# Patient Record
Sex: MALE | Race: BLACK OR AFRICAN AMERICAN | NOT HISPANIC OR LATINO
[De-identification: names, ages, dates, MRNs, and addresses within clinical notes are randomized per-mention and may not be internally consistent; named-entity substitution may affect disease eponyms.]

---

## 2021-11-30 ENCOUNTER — TRANSCRIPTION ENCOUNTER (OUTPATIENT)
Age: 46
End: 2021-11-30

## 2021-11-30 ENCOUNTER — INPATIENT (INPATIENT)
Facility: HOSPITAL | Age: 46
LOS: 6 days | Discharge: NURSING, REHAB OR LONG-TERM CARE | DRG: 853 | End: 2021-12-07
Attending: INTERNAL MEDICINE | Admitting: INTERNAL MEDICINE
Payer: MEDICAID

## 2021-11-30 VITALS
HEART RATE: 117 BPM | SYSTOLIC BLOOD PRESSURE: 132 MMHG | HEIGHT: 78 IN | TEMPERATURE: 98 F | DIASTOLIC BLOOD PRESSURE: 85 MMHG | WEIGHT: 175.05 LBS | RESPIRATION RATE: 20 BRPM | OXYGEN SATURATION: 100 %

## 2021-11-30 LAB
A1C WITH ESTIMATED AVERAGE GLUCOSE RESULT: 14.3 % — HIGH (ref 4–5.6)
ALBUMIN SERPL ELPH-MCNC: 2.4 G/DL — LOW (ref 3.4–5)
ALP SERPL-CCNC: 235 U/L — HIGH (ref 40–120)
ALT FLD-CCNC: 12 U/L — SIGNIFICANT CHANGE UP (ref 12–42)
AMPHET UR-MCNC: NEGATIVE — SIGNIFICANT CHANGE UP
ANION GAP SERPL CALC-SCNC: 27 MMOL/L — HIGH (ref 5–17)
ANION GAP SERPL CALC-SCNC: 31 MMOL/L — HIGH (ref 9–16)
APPEARANCE UR: CLEAR — SIGNIFICANT CHANGE UP
APTT BLD: 39.6 SEC — HIGH (ref 27.5–35.5)
AST SERPL-CCNC: 12 U/L — LOW (ref 15–37)
B-OH-BUTYR SERPL-SCNC: 6.1 MMOL/L — HIGH
BACTERIA # UR AUTO: PRESENT /HPF
BARBITURATES UR SCN-MCNC: NEGATIVE — SIGNIFICANT CHANGE UP
BASOPHILS # BLD AUTO: 0 K/UL — SIGNIFICANT CHANGE UP (ref 0–0.2)
BASOPHILS # BLD AUTO: 0.07 K/UL — SIGNIFICANT CHANGE UP (ref 0–0.2)
BASOPHILS NFR BLD AUTO: 0 % — SIGNIFICANT CHANGE UP (ref 0–2)
BASOPHILS NFR BLD AUTO: 0.3 % — SIGNIFICANT CHANGE UP (ref 0–2)
BENZODIAZ UR-MCNC: NEGATIVE — SIGNIFICANT CHANGE UP
BILIRUB SERPL-MCNC: 0.6 MG/DL — SIGNIFICANT CHANGE UP (ref 0.2–1.2)
BILIRUB UR-MCNC: ABNORMAL
BUN SERPL-MCNC: 45 MG/DL — HIGH (ref 7–23)
BUN SERPL-MCNC: 60 MG/DL — HIGH (ref 7–23)
CALCIUM SERPL-MCNC: 10.5 MG/DL — SIGNIFICANT CHANGE UP (ref 8.5–10.5)
CALCIUM SERPL-MCNC: 9.4 MG/DL — SIGNIFICANT CHANGE UP (ref 8.4–10.5)
CHLORIDE SERPL-SCNC: 79 MMOL/L — LOW (ref 96–108)
CHLORIDE SERPL-SCNC: 92 MMOL/L — LOW (ref 96–108)
CO2 SERPL-SCNC: 11 MMOL/L — LOW (ref 22–31)
CO2 SERPL-SCNC: 12 MMOL/L — LOW (ref 22–31)
COCAINE METAB.OTHER UR-MCNC: NEGATIVE — SIGNIFICANT CHANGE UP
COLOR SPEC: YELLOW — SIGNIFICANT CHANGE UP
CREAT SERPL-MCNC: 1.05 MG/DL — SIGNIFICANT CHANGE UP (ref 0.5–1.3)
CREAT SERPL-MCNC: 2.35 MG/DL — HIGH (ref 0.5–1.3)
DIFF PNL FLD: ABNORMAL
EOSINOPHIL # BLD AUTO: 0 K/UL — SIGNIFICANT CHANGE UP (ref 0–0.5)
EOSINOPHIL # BLD AUTO: 0 K/UL — SIGNIFICANT CHANGE UP (ref 0–0.5)
EOSINOPHIL NFR BLD AUTO: 0 % — SIGNIFICANT CHANGE UP (ref 0–6)
EOSINOPHIL NFR BLD AUTO: 0 % — SIGNIFICANT CHANGE UP (ref 0–6)
EPI CELLS # UR: SIGNIFICANT CHANGE UP /HPF (ref 0–5)
ESTIMATED AVERAGE GLUCOSE: 364 MG/DL — HIGH (ref 68–114)
ETHANOL SERPL-MCNC: <3 MG/DL — SIGNIFICANT CHANGE UP
GLUCOSE BLDC GLUCOMTR-MCNC: 316 MG/DL — HIGH (ref 70–99)
GLUCOSE BLDC GLUCOMTR-MCNC: 344 MG/DL — HIGH (ref 70–99)
GLUCOSE BLDC GLUCOMTR-MCNC: 369 MG/DL — HIGH (ref 70–99)
GLUCOSE BLDC GLUCOMTR-MCNC: 375 MG/DL — HIGH (ref 70–99)
GLUCOSE BLDC GLUCOMTR-MCNC: 472 MG/DL — CRITICAL HIGH (ref 70–99)
GLUCOSE BLDC GLUCOMTR-MCNC: 544 MG/DL — CRITICAL HIGH (ref 70–99)
GLUCOSE BLDC GLUCOMTR-MCNC: 559 MG/DL — CRITICAL HIGH (ref 70–99)
GLUCOSE BLDC GLUCOMTR-MCNC: >600 MG/DL — CRITICAL HIGH (ref 70–99)
GLUCOSE SERPL-MCNC: 523 MG/DL — CRITICAL HIGH (ref 70–99)
GLUCOSE SERPL-MCNC: 912 MG/DL — CRITICAL HIGH (ref 70–99)
GLUCOSE UR QL: 500
HCT VFR BLD CALC: 34.7 % — LOW (ref 39–50)
HCT VFR BLD CALC: 40.5 % — SIGNIFICANT CHANGE UP (ref 39–50)
HGB BLD-MCNC: 11.9 G/DL — LOW (ref 13–17)
HGB BLD-MCNC: 13.1 G/DL — SIGNIFICANT CHANGE UP (ref 13–17)
HYALINE CASTS # UR AUTO: ABNORMAL /LPF (ref 0–2)
IMM GRANULOCYTES NFR BLD AUTO: 4.2 % — HIGH (ref 0–1.5)
INR BLD: 1.32 — HIGH (ref 0.88–1.16)
KETONES UR-MCNC: >=80 MG/DL
LACTATE SERPL-SCNC: 2.3 MMOL/L — HIGH (ref 0.4–2)
LEUKOCYTE ESTERASE UR-ACNC: NEGATIVE — SIGNIFICANT CHANGE UP
LIDOCAIN IGE QN: 78 U/L — SIGNIFICANT CHANGE UP (ref 73–393)
LYMPHOCYTES # BLD AUTO: 1.51 K/UL — SIGNIFICANT CHANGE UP (ref 1–3.3)
LYMPHOCYTES # BLD AUTO: 11 % — LOW (ref 13–44)
LYMPHOCYTES # BLD AUTO: 2.2 K/UL — SIGNIFICANT CHANGE UP (ref 1–3.3)
LYMPHOCYTES # BLD AUTO: 7.5 % — LOW (ref 13–44)
MAGNESIUM SERPL-MCNC: 2.2 MG/DL — SIGNIFICANT CHANGE UP (ref 1.6–2.6)
MAGNESIUM SERPL-MCNC: 2.6 MG/DL — SIGNIFICANT CHANGE UP (ref 1.6–2.6)
MANUAL SMEAR VERIFICATION: SIGNIFICANT CHANGE UP
MCHC RBC-ENTMCNC: 29.4 PG — SIGNIFICANT CHANGE UP (ref 27–34)
MCHC RBC-ENTMCNC: 29.5 PG — SIGNIFICANT CHANGE UP (ref 27–34)
MCHC RBC-ENTMCNC: 32.3 GM/DL — SIGNIFICANT CHANGE UP (ref 32–36)
MCHC RBC-ENTMCNC: 34.3 GM/DL — SIGNIFICANT CHANGE UP (ref 32–36)
MCV RBC AUTO: 86.1 FL — SIGNIFICANT CHANGE UP (ref 80–100)
MCV RBC AUTO: 90.8 FL — SIGNIFICANT CHANGE UP (ref 80–100)
METAMYELOCYTES # FLD: 2 % — HIGH (ref 0–0)
METHADONE UR-MCNC: NEGATIVE — SIGNIFICANT CHANGE UP
MONOCYTES # BLD AUTO: 0 K/UL — SIGNIFICANT CHANGE UP (ref 0–0.9)
MONOCYTES # BLD AUTO: 1.37 K/UL — HIGH (ref 0–0.9)
MONOCYTES NFR BLD AUTO: 0 % — LOW (ref 2–14)
MONOCYTES NFR BLD AUTO: 6.8 % — SIGNIFICANT CHANGE UP (ref 2–14)
MYELOCYTES NFR BLD: 2 % — HIGH (ref 0–0)
NEUTROPHILS # BLD AUTO: 16.24 K/UL — HIGH (ref 1.8–7.4)
NEUTROPHILS # BLD AUTO: 16.97 K/UL — HIGH (ref 1.8–7.4)
NEUTROPHILS NFR BLD AUTO: 70 % — SIGNIFICANT CHANGE UP (ref 43–77)
NEUTROPHILS NFR BLD AUTO: 81.2 % — HIGH (ref 43–77)
NEUTS BAND # BLD: 15 % — HIGH (ref 0–8)
NITRITE UR-MCNC: NEGATIVE — SIGNIFICANT CHANGE UP
NRBC # BLD: 0 /100 WBCS — SIGNIFICANT CHANGE UP (ref 0–0)
NRBC # BLD: 0 /100 — SIGNIFICANT CHANGE UP (ref 0–0)
NRBC # BLD: SIGNIFICANT CHANGE UP /100 WBCS (ref 0–0)
NT-PROBNP SERPL-SCNC: 220 PG/ML — SIGNIFICANT CHANGE UP
OPIATES UR-MCNC: NEGATIVE — SIGNIFICANT CHANGE UP
PCO2 BLDV: 24 MMHG — LOW (ref 42–55)
PCP SPEC-MCNC: SIGNIFICANT CHANGE UP
PCP UR-MCNC: NEGATIVE — SIGNIFICANT CHANGE UP
PH BLDV: 7.2 — LOW (ref 7.32–7.43)
PH UR: 5 — SIGNIFICANT CHANGE UP (ref 5–8)
PHOSPHATE SERPL-MCNC: 2.8 MG/DL — SIGNIFICANT CHANGE UP (ref 2.5–4.5)
PLAT MORPH BLD: NORMAL — SIGNIFICANT CHANGE UP
PLATELET # BLD AUTO: 575 K/UL — HIGH (ref 150–400)
PLATELET # BLD AUTO: 580 K/UL — HIGH (ref 150–400)
PLATELET COUNT - ESTIMATE: ABNORMAL
PO2 BLDV: <35 MMHG — SIGNIFICANT CHANGE UP (ref 25–45)
POTASSIUM SERPL-MCNC: 4.8 MMOL/L — SIGNIFICANT CHANGE UP (ref 3.5–5.3)
POTASSIUM SERPL-MCNC: 6.7 MMOL/L — CRITICAL HIGH (ref 3.5–5.3)
POTASSIUM SERPL-SCNC: 4.8 MMOL/L — SIGNIFICANT CHANGE UP (ref 3.5–5.3)
POTASSIUM SERPL-SCNC: 6.7 MMOL/L — CRITICAL HIGH (ref 3.5–5.3)
PROT SERPL-MCNC: 10.1 G/DL — HIGH (ref 6.4–8.2)
PROT UR-MCNC: NEGATIVE MG/DL — SIGNIFICANT CHANGE UP
PROTHROM AB SERPL-ACNC: 15.6 SEC — HIGH (ref 10.6–13.6)
RBC # BLD: 4.03 M/UL — LOW (ref 4.2–5.8)
RBC # BLD: 4.46 M/UL — SIGNIFICANT CHANGE UP (ref 4.2–5.8)
RBC # FLD: 13.3 % — SIGNIFICANT CHANGE UP (ref 10.3–14.5)
RBC # FLD: 13.7 % — SIGNIFICANT CHANGE UP (ref 10.3–14.5)
RBC BLD AUTO: NORMAL — SIGNIFICANT CHANGE UP
RBC CASTS # UR COMP ASSIST: < 5 /HPF — SIGNIFICANT CHANGE UP
SAO2 % BLDV: 47.2 % — LOW (ref 67–88)
SARS-COV-2 RNA SPEC QL NAA+PROBE: SIGNIFICANT CHANGE UP
SMUDGE CELLS # BLD: PRESENT — SIGNIFICANT CHANGE UP
SODIUM SERPL-SCNC: 121 MMOL/L — LOW (ref 132–145)
SODIUM SERPL-SCNC: 131 MMOL/L — LOW (ref 135–145)
SP GR SPEC: 1.02 — SIGNIFICANT CHANGE UP (ref 1–1.03)
THC UR QL: NEGATIVE — SIGNIFICANT CHANGE UP
TROPONIN I, HIGH SENSITIVITY RESULT: 6 NG/L — SIGNIFICANT CHANGE UP
TROPONIN T SERPL-MCNC: 0.01 NG/ML — SIGNIFICANT CHANGE UP (ref 0–0.01)
UROBILINOGEN FLD QL: 0.2 E.U./DL — SIGNIFICANT CHANGE UP
WBC # BLD: 19.97 K/UL — HIGH (ref 3.8–10.5)
WBC # BLD: 20.03 K/UL — HIGH (ref 3.8–10.5)
WBC # FLD AUTO: 19.97 K/UL — HIGH (ref 3.8–10.5)
WBC # FLD AUTO: 20.03 K/UL — HIGH (ref 3.8–10.5)
WBC UR QL: < 5 /HPF — SIGNIFICANT CHANGE UP

## 2021-11-30 PROCEDURE — 73630 X-RAY EXAM OF FOOT: CPT | Mod: 26,RT

## 2021-11-30 PROCEDURE — 99285 EMERGENCY DEPT VISIT HI MDM: CPT | Mod: 25

## 2021-11-30 PROCEDURE — 71045 X-RAY EXAM CHEST 1 VIEW: CPT | Mod: 26

## 2021-11-30 PROCEDURE — 99291 CRITICAL CARE FIRST HOUR: CPT

## 2021-11-30 PROCEDURE — 93010 ELECTROCARDIOGRAM REPORT: CPT

## 2021-11-30 RX ORDER — INSULIN HUMAN 100 [IU]/ML
8 INJECTION, SOLUTION SUBCUTANEOUS ONCE
Refills: 0 | Status: COMPLETED | OUTPATIENT
Start: 2021-11-30 | End: 2021-11-30

## 2021-11-30 RX ORDER — VANCOMYCIN HCL 1 G
1000 VIAL (EA) INTRAVENOUS EVERY 12 HOURS
Refills: 0 | Status: DISCONTINUED | OUTPATIENT
Start: 2021-12-01 | End: 2021-12-02

## 2021-11-30 RX ORDER — SODIUM CHLORIDE 9 MG/ML
1000 INJECTION INTRAMUSCULAR; INTRAVENOUS; SUBCUTANEOUS ONCE
Refills: 0 | Status: COMPLETED | OUTPATIENT
Start: 2021-11-30 | End: 2021-11-30

## 2021-11-30 RX ORDER — SODIUM CHLORIDE 9 MG/ML
2000 INJECTION INTRAMUSCULAR; INTRAVENOUS; SUBCUTANEOUS ONCE
Refills: 0 | Status: COMPLETED | OUTPATIENT
Start: 2021-11-30 | End: 2021-11-30

## 2021-11-30 RX ORDER — PIPERACILLIN AND TAZOBACTAM 4; .5 G/20ML; G/20ML
3.38 INJECTION, POWDER, LYOPHILIZED, FOR SOLUTION INTRAVENOUS ONCE
Refills: 0 | Status: COMPLETED | OUTPATIENT
Start: 2021-11-30 | End: 2021-11-30

## 2021-11-30 RX ORDER — ENOXAPARIN SODIUM 100 MG/ML
40 INJECTION SUBCUTANEOUS AT BEDTIME
Refills: 0 | Status: DISCONTINUED | OUTPATIENT
Start: 2021-11-30 | End: 2021-12-01

## 2021-11-30 RX ORDER — ATORVASTATIN CALCIUM 80 MG/1
20 TABLET, FILM COATED ORAL AT BEDTIME
Refills: 0 | Status: DISCONTINUED | OUTPATIENT
Start: 2021-11-30 | End: 2021-12-07

## 2021-11-30 RX ORDER — DEXTROSE 50 % IN WATER 50 %
50 SYRINGE (ML) INTRAVENOUS
Refills: 0 | Status: DISCONTINUED | OUTPATIENT
Start: 2021-11-30 | End: 2021-12-07

## 2021-11-30 RX ORDER — CALCIUM CARBONATE 500(1250)
1 TABLET ORAL THREE TIMES A DAY
Refills: 0 | Status: DISCONTINUED | OUTPATIENT
Start: 2021-11-30 | End: 2021-12-07

## 2021-11-30 RX ORDER — VANCOMYCIN HCL 1 G
1000 VIAL (EA) INTRAVENOUS ONCE
Refills: 0 | Status: COMPLETED | OUTPATIENT
Start: 2021-11-30 | End: 2021-11-30

## 2021-11-30 RX ORDER — INSULIN HUMAN 100 [IU]/ML
7 INJECTION, SOLUTION SUBCUTANEOUS
Qty: 100 | Refills: 0 | Status: DISCONTINUED | OUTPATIENT
Start: 2021-11-30 | End: 2021-11-30

## 2021-11-30 RX ORDER — ATORVASTATIN CALCIUM 80 MG/1
1 TABLET, FILM COATED ORAL
Qty: 0 | Refills: 0 | DISCHARGE

## 2021-11-30 RX ORDER — SIMETHICONE 80 MG/1
80 TABLET, CHEWABLE ORAL
Refills: 0 | Status: DISCONTINUED | OUTPATIENT
Start: 2021-11-30 | End: 2021-12-07

## 2021-11-30 RX ORDER — PIPERACILLIN AND TAZOBACTAM 4; .5 G/20ML; G/20ML
3.38 INJECTION, POWDER, LYOPHILIZED, FOR SOLUTION INTRAVENOUS EVERY 6 HOURS
Refills: 0 | Status: DISCONTINUED | OUTPATIENT
Start: 2021-11-30 | End: 2021-11-30

## 2021-11-30 RX ORDER — PIPERACILLIN AND TAZOBACTAM 4; .5 G/20ML; G/20ML
3.38 INJECTION, POWDER, LYOPHILIZED, FOR SOLUTION INTRAVENOUS EVERY 6 HOURS
Refills: 0 | Status: DISCONTINUED | OUTPATIENT
Start: 2021-12-01 | End: 2021-12-02

## 2021-11-30 RX ORDER — ACETAMINOPHEN 500 MG
650 TABLET ORAL EVERY 6 HOURS
Refills: 0 | Status: DISCONTINUED | OUTPATIENT
Start: 2021-11-30 | End: 2021-12-07

## 2021-11-30 RX ORDER — PANTOPRAZOLE SODIUM 20 MG/1
40 TABLET, DELAYED RELEASE ORAL
Refills: 0 | Status: DISCONTINUED | OUTPATIENT
Start: 2021-11-30 | End: 2021-12-02

## 2021-11-30 RX ORDER — INSULIN HUMAN 100 [IU]/ML
7 INJECTION, SOLUTION SUBCUTANEOUS
Qty: 50 | Refills: 0 | Status: DISCONTINUED | OUTPATIENT
Start: 2021-11-30 | End: 2021-12-01

## 2021-11-30 RX ORDER — INFLUENZA VIRUS VACCINE 15; 15; 15; 15 UG/.5ML; UG/.5ML; UG/.5ML; UG/.5ML
0.5 SUSPENSION INTRAMUSCULAR ONCE
Refills: 0 | Status: COMPLETED | OUTPATIENT
Start: 2021-11-30 | End: 2021-12-02

## 2021-11-30 RX ADMIN — INSULIN HUMAN 7 UNIT(S)/HR: 100 INJECTION, SOLUTION SUBCUTANEOUS at 15:55

## 2021-11-30 RX ADMIN — PIPERACILLIN AND TAZOBACTAM 200 GRAM(S): 4; .5 INJECTION, POWDER, LYOPHILIZED, FOR SOLUTION INTRAVENOUS at 22:02

## 2021-11-30 RX ADMIN — SODIUM CHLORIDE 2000 MILLILITER(S): 9 INJECTION INTRAMUSCULAR; INTRAVENOUS; SUBCUTANEOUS at 14:52

## 2021-11-30 RX ADMIN — PIPERACILLIN AND TAZOBACTAM 200 GRAM(S): 4; .5 INJECTION, POWDER, LYOPHILIZED, FOR SOLUTION INTRAVENOUS at 15:08

## 2021-11-30 RX ADMIN — INSULIN HUMAN 8 UNIT(S): 100 INJECTION, SOLUTION SUBCUTANEOUS at 15:53

## 2021-11-30 RX ADMIN — Medication 250 MILLIGRAM(S): at 15:08

## 2021-11-30 RX ADMIN — SODIUM CHLORIDE 1000 MILLILITER(S): 9 INJECTION INTRAMUSCULAR; INTRAVENOUS; SUBCUTANEOUS at 15:07

## 2021-11-30 RX ADMIN — INSULIN HUMAN 7 UNIT(S)/HR: 100 INJECTION, SOLUTION SUBCUTANEOUS at 19:52

## 2021-11-30 RX ADMIN — SODIUM CHLORIDE 2000 MILLILITER(S): 9 INJECTION INTRAMUSCULAR; INTRAVENOUS; SUBCUTANEOUS at 13:23

## 2021-11-30 NOTE — ED PROVIDER NOTE - CLINICAL SUMMARY MEDICAL DECISION MAKING FREE TEXT BOX
47 y/o M presents to ED with c/o generalized weakness, malaise, shortness of breath and chest pain.  Pt afebrile, tachycardic 100-110 bmp upon arrival, normotensive.  Pt hyperglycemic, blood glucose ~900 with anion gap 30.  pH 7.2. potassium 6.7.  no peaked twaves on EKG.  Pt in DKA likely secondary to infected diabetic left foot ulcer.  WBC ~20.  Bun/Cr 60/2.3.  Pt hydrated with 3L of NS and given insulin.  Pt pan cultured and abx Vanc/zosyn administered. Initial lactate 2.3.      Pt discussed with Dr. Garcia and accepted to medical ICU.  Pt amenable to admission.

## 2021-11-30 NOTE — H&P ADULT - HISTORY OF PRESENT ILLNESS
45yo M PMH DM (diagnosed 11/2020), HLD presented from [facility] complaining of chest pain. Pt states he has not been feeling well and has had "a lot going on", but that he called EMS for new onset chest pain, substernal and pressure-like. Says his last meal was approx 1 week ago but he reports having 2 iced coffees yesterday. Also reporting polyuria, polydipsia and decreased appetite for the past 1 week. Of note pt was diagnosed w/ DM last year 11/2020 at which time he was admitted for DKA and all toes on his L foot were amputated.     ED course:  VS:   Labs: wbc 19.97, Hgb 13.1, K 6.7, AG 31, Glucose 912, lactate 2.3  Interventions: insulin gtt, 3L NS  EKG: 47yo M [facility] IDDM (diagnosed 11/2020) presented from [facility] complaining of chest pain. Pt states he has not been feeling well and has had "a lot going on", but that he called EMS for new onset chest pain, substernal and pressure-like. Reports polyuria, polydipsia and decreased appetite x1 week. Says his last meal was approx 1 week ago but that he had 2 iced coffees yesterday. Also has had hiccups for past "few days", and reportedly vomited x1 at home today.  Of note, pt was diagnosed w/ DM last year 11/2020 at which time he was admitted for DKA and all toes on his L foot were amputated. Reports his maternal grandmother had DM but no other family h/o DM.  Reportedly COVID vaccinated.    ED course:  VS: T 97.9, , /85, RR 20, SpO2 100% on RA  Labs: wbc 19.97, Hgb 13.1, K 6.7, AG 31, Glucose 912, lactate 2.3  Interventions: insulin gtt, 3L NS  EKG: sinus tachycardia  XR R foot: relative osteopenia of the fifth ray, equivocal for sequelae of acute osteomyelitis. 45yo M [facility] IDDM (diagnosed 11/2020) presented from [facility] complaining of chest pain. Pt states he has not been feeling well and has had "a lot going on", but that he called EMS for new onset chest pain, substernal and pressure-like. Reports polyuria, polydipsia and decreased appetite x1 week. Says his last meal was approx 1 week ago but that he had 2 iced coffees yesterday. Also has had hiccups for past "few days", and reportedly vomited x1 at home today.  Of note, pt was diagnosed w/ DM last year 11/2020 at which time he was admitted for DKA and all toes on his L foot were amputated. Reports his maternal grandmother had DM but no other family h/o DM.  Reportedly COVID vaccinated.    ED course:  VS: T 97.9, , /85, RR 20, SpO2 100% on RA  Labs: wbc 19.97, Hgb 13.1, K 6.7, AG 31, Glucose 912, lactate 2.3  Interventions: insulin gtt, 3L NS  EKG: sinus tachycardia  CXR: hyperinflation  XR R foot: relative osteopenia of the fifth ray, equivocal for sequelae of acute osteomyelitis.

## 2021-11-30 NOTE — ED PROVIDER NOTE - OBJECTIVE STATEMENT
47 y/o M with PMH of DM     c/o generalized weakness and shortness of breath with intermittent chest pain x 1.5 weeks.  He endorses difficulty with ADLs and sleeping due to discomfort.  He denies SOB at this times.    Pt also c/o LLE laceration x 1 week.  He has not been evaluated for the wound and has been caring for the wound.  He cleansed the wound with iodine and wrapped it with a dressing.      Pt endorses constipation    Pt denies fevers/chills, headache,   abd pain, n/v, blood in emesis, dysuria, sick contacts, hospitalizations, recent travels.  Pt is vaccinated against COVID 19. 47 y/o M with PMH of DM2 c/o generalized weakness and shortness of breath and dizziness with intermittent chest pain x 1.5 weeks.  He gradually had a decreased appetite and stopped taking his prescribed insulin 2 days ago once he stopped eating.  He endorses difficulty with ADLs and trouble sleeping due to discomfort.  Pt arrives via EMS.  He denies SOB or chest pain at this time. Pt also c/o left foot laceration x 1 week.  He has not been evaluated for the wound but has been caring for the wound.  He cleansed the wound with iodine and placed a large band aid.  He does not recall how he got the initial laceration. Pt endorses constipation.  Pt denies fevers/chills, headache, abd pain, n/v, blood in emesis, dysuria, sick contacts, hospitalizations, recent travels.  Pt is vaccinated against COVID19.

## 2021-11-30 NOTE — H&P ADULT - ATTENDING COMMENTS
home [place] [id number]  This is a 47 y/o male with IDDM2 and diabetic left foot s/o toes amputation who presented with chest pain (7/10) that was mid sternal, non - radiating, pressure like, constant, without SOB.  His labs showed glu 912, AG 31, lactate 2.3, pH 7.2, (+) ketone.  He was diagnosed with DKA and started on IVF and insulin and transferred to the  MICU.  See the resident’s note for the ROS/ Fh/SH as discussed.  Is slim, dehydrated, R foot with wet gangrene, podiatrist was able to drain some pus, awake, alert.  Has elevated WBC, ECG strip shows peak T waves and K was 6.2 on presentation.  This is a 47 y/o male with diabetic foot, previous surgery on the left foot, IDDM2 non - compliant with his medication as evidence of elevated HBA1C, in DKA with diabetic right foot requiring surgical intervention.  However, he is severely dehydrated, in DKA with elevated potassium, with ECG changes, already on antibiotics, he is critically ill has multi system involvement and his condition is of high acuity – high risk for immediate surgery.  This was discussed with the Podiatrist; we will resuscitate then take him to the OR for surgical intervention.  -AMS  -IDDM 2 with DKA  -DKA  -Diabetic foot – right side  -severe sepsis without shock  -chest pain resolved  >IVF, DKA protocol, CE, ECG, f/u K, empirical antibiotics (vanco, zosyn)  >appreciate Podiatry input.

## 2021-11-30 NOTE — ED PROVIDER NOTE - PHYSICAL EXAMINATION
Constitutional:  Unkempt, chronically ill appearing, awake, alert, oriented to person, place, time/situation and in no apparent distress  Head:  NC/AT, symmetric, neck supple  ENMT: Airway patent, nasal mucosa clear, mouth with normal mucosa, throat has no vesicles, no oropharyngeal exudates and vulva is midline  Eyes:  Clear bilaterally, pupils equal, round and reactive to light  Cardiac:  Normal rate, regular rhythm. Heart sounds S1,S2.  No murmurs, rubs or gallops.  No JVD.  No lower leg edema  Respiratory:  Breath sounds clear and equal bilaterally  GI:   Abd soft, non-tender, no guarding  MSK:  Spine appears normal, range of motion is not limited, no muscle or joint tenderness  Neuro:  Alert and oriented, no focal deficits, no motor or sensory deficits  Skin:      Psych:  Normal mood and affect, no apparent risk to self or others.  Heme:  No adenopathy or splenomegaly. Constitutional:  Unkempt, poor hygeine, awake, alert, oriented to person, place, time/situation and in no apparent distress  Head:  NC/AT, symmetric, neck supple  ENMT: Airway patent, dry mucosa clear, mouth with normal mucosa, throat has no vesicles, no oropharyngeal exudates and vulva is midline  Eyes:  Clear bilaterally, pupils equal, round and reactive to light  Cardiac:  Normal rate, regular rhythm. Heart sounds S1,S2.  No murmurs, rubs or gallops.  No JVD.  No lower leg edema  Respiratory:  Breath sounds clear and equal bilaterally  GI:   Abd soft, non-tender, no guarding  MSK:  Spine appears normal, range of motion is not limited, no muscle or joint tenderness.    Neuro:  Alert and oriented, no focal deficits, no motor .  Pt has impaired sensation to bilat foot  Skin:  + amputations to L toe.  R foot:  un-stageable foot ulcer to distal dorsal and lateral R foot and 5th digit, covered in purulence, surrounding well demarcated erythema of previously applied bandage, no crepitus, no ttp  Psych:  Normal mood and affect, no apparent risk to self or others.

## 2021-11-30 NOTE — ED PROVIDER NOTE - ATTENDING CONTRIBUTION TO CARE
45 yo male with DKA, source of infection is diabetic foot ulcer, with elevated AG, treated in D with insulin drip, IV abx, see NP note for details, accepted for admission to MICU.     The patient was seen immediately upon arrival due to a high probability of imminent or life-threatening deterioration secondary to XX, which required my direct attention, intervention, and personal management at the bedside. I have personally provided critical care time exclusive of time spent on separately billable procedures. Time includes review of laboratory data, radiology results, discussion with consultants, discussion with the patient's family, and monitoring for potential decompensation.    CC time 90 minutes

## 2021-11-30 NOTE — H&P ADULT - NSHPLABSRESULTS_GEN_ALL_CORE
LABS:                         11.9   20.03 )-----------( 575      ( 2021 19:01 )             34.7         131<L>  |  92<L>  |  45<H>  ----------------------------<  523<HH>  4.8   |  12<L>  |  1.05    Ca    9.4      2021 19:01  Phos  2.8       Mg     2.2         TPro  10.1<H>  /  Alb  2.4<L>  /  TBili  0.6  /  DBili  x   /  AST  12<L>  /  ALT  12  /  AlkPhos  235<H>      PT/INR - ( 2021 13:25 )   PT: 15.6 sec;   INR: 1.32          PTT - ( 2021 13:25 )  PTT:39.6 sec  Urinalysis Basic - ( 2021 15:59 )    Color: Yellow / Appearance: Clear / S.020 / pH: x  Gluc: x / Ketone: >=80 mg/dL  / Bili: Small / Urobili: 0.2 E.U./dL   Blood: x / Protein: NEGATIVE mg/dL / Nitrite: NEGATIVE   Leuk Esterase: NEGATIVE / RBC: < 5 /HPF / WBC < 5 /HPF   Sq Epi: x / Non Sq Epi: 0-5 /HPF / Bacteria: Present /HPF      CARDIAC MARKERS ( 2021 19:01 )  x     / 0.01 ng/mL / x     / x     / x          Serum Pro-Brain Natriuretic Peptide: 220 pg/mL ( @ 13:25)    Lactate, Blood: 2.3 mmoL/L ( @ 15:21)      RADIOLOGY, EKG & ADDITIONAL TESTS:

## 2021-11-30 NOTE — CONSULT NOTE ADULT - ASSESSMENT
45yo M PMH IDDM (diagnosed 11/2020) presented from [facility] complaining of chest pain. Reports polyuria, polydipsia and decreased appetite x1 week. Of note, pt was diagnosed w/ DM last year 11/2020 at which time he was admitted for DKA and all toes on his L foot were amputated.    Podiatry was consulted for the management of the right foot infected diabetic ulcer. Pt was noted to have gas on xray near the 5th metatarsal, although the xray read did not mention it. Right foot is malodorous and 5ccs of purulent drainage was expressed from the proximal aspect of the ulcer (dorsally over the 4th and 5th metatarsal). MICU was notified about the findings of gas on xray. Podiatry Attending discussed with MICU Attending about the importance of taking the patient to the OR immediately, however given his abn electrolyte (Potassium) and possibility of patient having arrythmia' s and increased glucose-- it was decided that we wait until patient has finished his drip and has labs have normalized. We will re-evaluate at 7AM.     Plan:  - Plan for OR in the AM  - Pt consented and consent placed in chart   - F/U CT of Right foot   - Dressed R foot with gauze    Plan d/w Attending  Podiatry Following

## 2021-11-30 NOTE — H&P ADULT - NSHPSOCIALHISTORY_GEN_ALL_CORE
lives alone in [facility] in [place]  Smokes <1/2 PPD  Denies alcohol use and other drug use

## 2021-11-30 NOTE — H&P ADULT - NSHPPHYSICALEXAM_GEN_ALL_CORE
T(C): 36.3 (11-30-21 @ 18:25), Max: 36.6 (11-30-21 @ 12:43)  HR: 112 (11-30-21 @ 20:00) (107 - 117)  BP: 132/84 (11-30-21 @ 20:00) (126/87 - 147/104)  RR: 16 (11-30-21 @ 20:00) (16 - 20)  SpO2: 100% (11-30-21 @ 20:00) (97% - 100%)    General: thin male NAD, laying in bed, speaking in full sentences  HEENT: head NC/AT, no conjunctival injection, EOMI, MMM  Neck: supple, no JVD  Cardio: RRR, +S1/S2, no M/R/G  Resp: lungs CTAB, no cough/wheezes/rales/rhonchi  Abdo: soft, NT, ND, +bowel sounds x4, no organomegaly or palpable mass    Extremities: WWP, no edema/cyanosis/clubbing   Vasc: 2+ radial and DP pulses b/l  Neuro: A&Ox3  Psych: speech non-pressured, thoughts goal-oriented  Skin: dry, intact, no visible jaundice   MSK: no joint swelling T(C): 36.3 (11-30-21 @ 18:25), Max: 36.6 (11-30-21 @ 12:43)  HR: 112 (11-30-21 @ 20:00) (107 - 117)  BP: 132/84 (11-30-21 @ 20:00) (126/87 - 147/104)  RR: 16 (11-30-21 @ 20:00) (16 - 20)  SpO2: 100% (11-30-21 @ 20:00) (97% - 100%)    General: thin male resting in bed, somnolent-appearing w/ eyelids intermittently drooping, speaking in full sentences, satting well on RA  HEENT: head NC/AT, no conjunctival injection, EOMI, PERRL, dry MM  Neck: no JVD  Cardio: tachycardic w/ regular rhythm, +S1/S2, no M/R/G  Resp: upper lung fields CTAB, decreased breath sounds at b/l bases but pt w/ decreased effort, +hiccups  Abdo: flat, soft, NT/ND, decreased bowel sounds, no organomegaly or palpable masses   Extremities: b/l LEs cool to touch, b/l LEs w/ dry, cracked skin; LLE s/p amputation of all 5 toes, RLE w/ necrotic 5th toe; both feet wrapped in gauze  Vasc: 0-1+ DP pulses b/l, 2+ radial pulses b/l  Neuro: A&Ox3, decreased sensation in b/l LEs  Psych: odd affect  Skin: multiple small scars across back and arms

## 2021-11-30 NOTE — ED ADULT NURSE NOTE - NSIMPLEMENTINTERV_GEN_ALL_ED
Implemented All Fall with Harm Risk Interventions:  [place] to call system. Call bell, personal items and telephone within reach. Instruct patient to call for assistance. Room bathroom lighting operational. Non-slip footwear when patient is off stretcher. Physically safe environment: no spills, clutter or unnecessary equipment. Stretcher in lowest position, wheels locked, appropriate side rails in place. Provide visual cue, wrist band, yellow gown, etc. Monitor gait and stability. Monitor for mental status changes and reorient to person, place, and time. Review medications for side effects contributing to fall risk. Reinforce activity limits and safety measures with patient and family. Provide visual clues: red socks.

## 2021-11-30 NOTE — H&P ADULT - ASSESSMENT
Hold off on further prednisone since SOB has improved.   Should schedule CT chest as previously ordered since CXR was negative.   45yo M IDDM and diabetic neuropathy BIBEMS for CP, found to have DKA, admitted to MICU for insulin gtt and severe sepsis 2/2 possible OM of R foot.    Neuro  #AMS 2/2 DKA vs severe sepsis  Pt lethargic and somnolent on exam, unclear whether this is pt's baseline. Reports his last meal was 1 week ago. Denies substance use.  - UA negative  - f/u ammonia level  - tx DKA as below    Pulm  #metabolic acidosis, compensated  ABG w/ pCO2 24, metabolic acidosis appropriately compensated according to Winter's formula  - currently satting well on RA  - keep O2 sat >94%    Cardiovascular  #Chest pain  Pt complained of chest pain, says this is why he called EMS. Pain was substernal, pressure-like. Non-reproducible and now resolved. No EKG on file. No h/o CAD or other cardiovascular history.  - troponin <0.01  - f/u EKG  - will give tums, simethicone and start PPI to r/o GERD  - continue to monitor    GI  #decreased appetite  - start PPI for GERD  - c/w DKA tx as below  - NPO until gap closes x2    Renal  #hyperkalemia  Hyperkalemic w/ K 6.8 on admission, no EKG on file. Treated only w/ 3L IVF.  - repeat K 4.8  - continue to monitor and replete along w/ DKA tx    ID  #severe sepsis w/o shock  Pt met 2/4 SIRS on presentation (Hr >90, wbcs >12) w/ lactate 2.3. Now s/p 3L NS. R foot possible source.  - s/p 3L NS, vanc/zosyn x1 in ED  - f/u repeat lactate and continue fluids if indicated  - c/w vanc/zosyn    #cellulitis vs gangrene vs OM of R foot  Pt w/ known h/o DKA and h/o amputation of L toes 11/2020 2/2 diabetic neuropathy.  - XR R foot: relative osteopenia of the fifth ray, equivocal for sequelae of acute osteomyelitis.  - s/p vanc/zosyn x1 in ED @ 2pm  - c/w vanc/zosyn  - podiatry consulted, f/u recs  - f/u bcx, ucx    Endo  #DKA  Glucose 912 on admission w/ AG 31, K 6.8, bicarb 11, BHB 6.1. Pt diagnosed w/ DM last year 11/2020 when he was hospitalized for DKA, also had R toe amputation during that hospitalization.  - DKA protocol  - A1c 14.3%    #DM  Diagnosed last year. Reportedly takes insulin at home but only intermittently compliant. Reportedly takes basaglar 40u qhs, novolog 1u TID, but prescribed 50u qhs, 15u TID. Meds last picked up 8/2021. Med rec done (in handoff)  - tx DKA as above    Heme  no active issues    Prophylaxis  F: s/p 3L NS in ED  E: replete PRN  N: NPO  GI ppx: PPI  DVT ppx:  Code status: FULL CODE  Dispo: MICU   47yo M IDDM and diabetic neuropathy BIBEMS for CP, found to have DKA, admitted to MICU for insulin gtt and severe sepsis 2/2 possible OM of R foot.    Neuro  #AMS 2/2 DKA vs severe sepsis  Pt lethargic and somnolent on exam, unclear whether this is pt's baseline. Reports his last meal was 1 week ago. Denies substance use.  - UA negative  - f/u ammonia level  - tx DKA as below    Pulm  #metabolic acidosis, compensated  ABG w/ pCO2 24, metabolic acidosis appropriately compensated according to Winter's formula  - currently satting well on RA  - keep O2 sat >94%    Cardiovascular  #Chest pain  Pt complained of chest pain, says this is why he called EMS. Pain was substernal, pressure-like. Non-reproducible and now resolved. No EKG on file. No h/o CAD or other cardiovascular history.  - troponin <0.01  - f/u EKG  - will give tums, simethicone and start PPI to r/o GERD  - continue to monitor    GI  #decreased appetite  - start PPI for GERD  - c/w DKA tx as below  - NPO until gap closes x2    Renal  #hyperkalemia  Hyperkalemic w/ K 6.8 on admission, no EKG on file. Treated only w/ 3L IVF.  - repeat K 4.8  - continue to monitor and replete along w/ DKA tx    ID  #severe sepsis w/o shock  Pt met 2/4 SIRS on presentation (Hr >90, wbcs >12) w/ lactate 2.3. Now s/p 3L NS. R foot possible source.  - s/p 3L NS, vanc/zosyn x1 in ED  - f/u repeat lactate and continue fluids if indicated  - c/w vanc/zosyn    #cellulitis vs gangrene vs OM of R foot  Pt w/ known h/o DKA and h/o amputation of L toes 11/2020 2/2 diabetic neuropathy.  - XR R foot: relative osteopenia of the fifth ray, equivocal for sequelae of acute osteomyelitis.  - s/p vanc/zosyn x1 in ED @ 2pm  - c/w vanc/zosyn  - podiatry consulted, f/u recs  - f/u bcx, ucx    Endo  #DKA  Glucose 912 on admission w/ AG 31, K 6.8, bicarb 11, BHB 6.1. Pt diagnosed w/ DM last year 11/2020 when he was hospitalized for DKA, also had R toe amputation during that hospitalization.  - DKA protocol  - A1c 14.3%  - endocrine consult in AM    #DM  Diagnosed last year. Reportedly takes insulin at home but only intermittently compliant. Reportedly takes basaglar 40u qhs, novolog 1u TID, but prescribed 50u qhs, 15u TID. Meds last picked up 8/2021. Med rec done (in handoff)  - tx DKA as above    Heme  no active issues    Prophylaxis  F: s/p 3L NS in ED  E: replete PRN  N: NPO  GI ppx: PPI  DVT ppx: lovenox 30mg subq q24h  Code status: FULL CODE  Dispo: MICU   45yo M IDDM and diabetic neuropathy BIBEMS for CP, found to have DKA, admitted to MICU for insulin gtt and severe sepsis 2/2 possible OM of R foot.    Neuro  #AMS 2/2 DKA vs severe sepsis  Pt lethargic and somnolent on exam, unclear whether this is pt's baseline. Reports his last meal was 1 week ago. Denies substance use.  - UA negative  - f/u ammonia level  - tx DKA as below    Pulm  #metabolic acidosis, compensated  ABG w/ pCO2 24, metabolic acidosis appropriately compensated according to Winter's formula  - currently satting well on RA  - keep O2 sat >94%    Cardiovascular  #Chest pain  Pt complained of chest pain, says this is why he called EMS. Pain was substernal, pressure-like. Non-reproducible and now resolved. No EKG on file. No h/o CAD or other cardiovascular history.  - troponin <0.01  - f/u EKG  - will give tums, simethicone and start PPI to r/o GERD  - continue to monitor    #HLD  According to med rec pt prescribed Atorvastatin 20mg qhs, though denies h/o HLD and did not report taking this medication.  - c/w atorvastatin 20mg qhs    GI  #decreased appetite  - start PPI for GERD  - c/w DKA tx as below  - NPO until gap closes x2    Renal  #hyperkalemia  Hyperkalemic w/ K 6.8 on admission, no EKG on file. Treated only w/ 3L IVF.  - repeat K 4.8  - continue to monitor and replete along w/ DKA tx    ID  #severe sepsis w/o shock  Pt met 2/4 SIRS on presentation (Hr >90, wbcs >12) w/ lactate 2.3. Now s/p 3L NS. R foot possible source.  - s/p 3L NS, vanc/zosyn x1 in ED  - f/u repeat lactate and continue fluids if indicated  - c/w vanc/zosyn    #cellulitis vs gangrene vs OM of R foot  Pt w/ known h/o DKA and h/o amputation of L toes 11/2020 2/2 diabetic neuropathy.  - XR R foot: relative osteopenia of the fifth ray, equivocal for sequelae of acute osteomyelitis.  - s/p vanc/zosyn x1 in ED @ 2pm  - c/w vanc/zosyn  - podiatry consulted, f/u recs  - f/u bcx, ucx    Endo  #DKA  Glucose 912 on admission w/ AG 31, K 6.8, bicarb 11, BHB 6.1. Pt diagnosed w/ DM last year 11/2020 when he was hospitalized for DKA, also had R toe amputation during that hospitalization.  - DKA protocol  - A1c 14.3%  - endocrine consult in AM    #DM  Diagnosed last year. Reportedly takes insulin at home but only intermittently compliant. Reportedly takes basaglar 40u qhs, novolog 1u TID, but prescribed 50u qhs, 15u TID. Meds last picked up 8/2021. Med rec done (in handoff)  - tx DKA as above    Heme  no active issues    Prophylaxis  F: s/p 3L NS in ED  E: replete PRN  N: NPO w/ meds  GI ppx: PPI  DVT ppx: lovenox 30mg subq q24h  Code status: FULL CODE  Dispo: MICU

## 2021-11-30 NOTE — ED ADULT NURSE NOTE - OBJECTIVE STATEMENT
here for SOB,chest pain,weakness,extreme thirst; FS HIGH in triage;dx Dm and taking insulin; also hx left bottom foot amputation; bootin place; wound noted to top of right foot; wrapped and covered by patient; gauze and wrap to be removed and assessed by NP and PA student

## 2021-11-30 NOTE — ED ADULT TRIAGE NOTE - CHIEF COMPLAINT QUOTE
BIBA from home c/o generalized weakness, SOB, dizziness x 1 week, occasional chest discomfort x 3 days. currently denies chest pain at this time.

## 2021-11-30 NOTE — ED PROVIDER NOTE - CARE PLAN
1 Principal Discharge DX:	DKA (diabetic ketoacidosis)  Secondary Diagnosis:	Sepsis  Secondary Diagnosis:	Diabetic foot ulcer

## 2021-11-30 NOTE — CONSULT NOTE ADULT - SUBJECTIVE AND OBJECTIVE BOX
Attending: Dr. Orion Ramirez DPM    Patient is a 46y old  Male who presents with a chief complaint of DKA (30 Nov 2021 19:30)      HPI:  47yo M PMH IDDM (diagnosed 11/2020) presented from [facility] complaining of chest pain. Pt states he has not been feeling well and has had "a lot going on", but that he called EMS for new onset chest pain, substernal and pressure-like. Reports polyuria, polydipsia and decreased appetite x1 week. Says his last meal was approx 1 week ago but that he had 2 iced coffees yesterday. Also has had hiccups for past "few days", and reportedly vomited x1 at home today.  Of note, pt was diagnosed w/ DM last year 11/2020 at which time he was admitted for DKA and all toes on his L foot were amputated. Reports his maternal grandmother had DM but no other family h/o DM.  Reportedly COVID vaccinated.    ED course:  VS: T 97.9, , /85, RR 20, SpO2 100% on RA  Labs: wbc 19.97, Hgb 13.1, K 6.7, AG 31, Glucose 912, lactate 2.3  Interventions: insulin gtt, 3L NS  EKG: sinus tachycardia  XR R foot: relative osteopenia of the fifth ray, equivocal for sequelae of acute osteomyelitis. (30 Nov 2021 19:30)    Podiatry was consulted for the management of the right foot infected diabetic ulcer. Pt was noted to have gas on xray near the 5th metatarsal, although the xray read did not mention it. Right foot is malodorous and 5ccs of purulent drainage was expressed from the proximal aspect of the ulcer (dorsally over the 4th and 5th metatarsal). MICU was notified about the findings of gas on xray.     Review of systems negative except per HPI    PAST MEDICAL & SURGICAL HISTORY:  Diabetes mellitus      Home Medications:  atorvastatin 20 mg oral tablet: 1 tab(s) orally once a day (30 Nov 2021 21:02)  Basaglar KwikPen 100 units/mL subcutaneous solution: 50 unit(s) subcutaneous once a day (at bedtime) (30 Nov 2021 21:02)  Insulin Lispro KwikPen 100 units/mL injectable solution: 15 unit(s) injectable 3 times a day (with meals) (30 Nov 2021 21:02)    Allergies    No Known Allergies    Intolerances      FAMILY HISTORY:    Social History:       LABS                        11.9   20.03 )-----------( 575      ( 30 Nov 2021 19:01 )             34.7     11-30    131<L>  |  92<L>  |  45<H>  ----------------------------<  523<HH>  4.8   |  12<L>  |  1.05    Ca    9.4      30 Nov 2021 19:01  Phos  2.8     11-30  Mg     2.2     11-30    TPro  10.1<H>  /  Alb  2.4<L>  /  TBili  0.6  /  DBili  x   /  AST  12<L>  /  ALT  12  /  AlkPhos  235<H>  11-30    PT/INR - ( 30 Nov 2021 13:25 )   PT: 15.6 sec;   INR: 1.32          PTT - ( 30 Nov 2021 13:25 )  PTT:39.6 sec    Vital Signs Last 24 Hrs  T(C): 36.3 (30 Nov 2021 18:25), Max: 36.6 (30 Nov 2021 12:43)  T(F): 97.4 (30 Nov 2021 18:25), Max: 97.9 (30 Nov 2021 12:43)  HR: 112 (30 Nov 2021 20:00) (107 - 117)  BP: 132/84 (30 Nov 2021 20:00) (126/87 - 147/104)  BP(mean): 101 (30 Nov 2021 20:00) (101 - 104)  RR: 16 (30 Nov 2021 20:00) (16 - 20)  SpO2: 100% (30 Nov 2021 20:00) (97% - 100%)    PHYSICAL EXAM  General: NAD, AA0x3    Lower Extremity Focused:  Vasc: Palpable pulses, edematous left foot  Derm: Ulcers present on the lateral and dorsal aspect of the right foot. Purulent drainage noted (about 5cc's expressed). Extremely malodorous with sloughing skin. Ischemic changes noted on the 5th digit. Calluses present on the plantar aspect of the foot on submet 1 and submet 5. Erythema present dorsally over 2-5 mets.   Neuro: Protective sensations diminished   MSK: MMSE 5/5 in all compartments. Amputation of all digits on the left foot.     RADIOLOGY  Resident Wet Read: Gas seen around the left 5th met head

## 2021-11-30 NOTE — PATIENT PROFILE ADULT - NSPRONUTRITIONRISK_GEN_A_NUR
Pressure injury stage 2 or greater/Significant decrease of oral intake greater than 3 days prior to admission/Unintentional weight loss prior to admission

## 2021-11-30 NOTE — PATIENT PROFILE ADULT - FALL HARM RISK - HARM RISK INTERVENTIONS
Assistance with ambulation/Assistance OOB with selected safe patient handling equipment/Communicate Risk of Fall with Harm to all staff/Discuss with provider need for PT consult/Monitor gait and stability/Reinforce activity limits and safety measures with patient and family/Tailored Fall Risk Interventions/Visual Cue: Yellow wristband and red socks/Bed in lowest position, wheels locked, appropriate side rails in place/Call bell, personal items and telephone in reach/Instruct patient to call for assistance before getting out of bed or chair/Non-slip footwear when patient is out of bed/Joaquin to call system/Physically safe environment - no spills, clutter or unnecessary equipment/Purposeful Proactive Rounding/Room/bathroom lighting operational, light cord in reach

## 2021-11-30 NOTE — H&P ADULT - NSHPREVIEWOFSYSTEMS_GEN_ALL_CORE
POSITIVE for polyuria, polydipsia, nausea, hiccups, decreased sensation in b/l LEs  NEGATIVE for CP, SOB, palpitations, cough, diarrhea, constipation, foot pain

## 2021-12-01 ENCOUNTER — OUTPATIENT (OUTPATIENT)
Dept: OUTPATIENT SERVICES | Facility: HOSPITAL | Age: 46
LOS: 1 days | End: 2021-12-01
Payer: MEDICAID

## 2021-12-01 ENCOUNTER — RESULT REVIEW (OUTPATIENT)
Age: 46
End: 2021-12-01

## 2021-12-01 DIAGNOSIS — M86.179 OTHER ACUTE OSTEOMYELITIS, UNSPECIFIED ANKLE AND FOOT: ICD-10-CM

## 2021-12-01 DIAGNOSIS — E78.5 HYPERLIPIDEMIA, UNSPECIFIED: ICD-10-CM

## 2021-12-01 DIAGNOSIS — E87.2 ACIDOSIS: ICD-10-CM

## 2021-12-01 DIAGNOSIS — E11.9 TYPE 2 DIABETES MELLITUS WITHOUT COMPLICATIONS: ICD-10-CM

## 2021-12-01 DIAGNOSIS — R63.8 OTHER SYMPTOMS AND SIGNS CONCERNING FOOD AND FLUID INTAKE: ICD-10-CM

## 2021-12-01 LAB
ALBUMIN SERPL ELPH-MCNC: 3.2 G/DL — LOW (ref 3.3–5)
ALP SERPL-CCNC: 191 U/L — HIGH (ref 40–120)
ALT FLD-CCNC: 8 U/L — LOW (ref 10–45)
AMMONIA BLD-MCNC: 43 UMOL/L — SIGNIFICANT CHANGE UP (ref 11–55)
ANION GAP SERPL CALC-SCNC: 15 MMOL/L — SIGNIFICANT CHANGE UP (ref 5–17)
ANION GAP SERPL CALC-SCNC: 17 MMOL/L — SIGNIFICANT CHANGE UP (ref 5–17)
ANION GAP SERPL CALC-SCNC: 18 MMOL/L — HIGH (ref 5–17)
APTT BLD: 35.3 SEC — SIGNIFICANT CHANGE UP (ref 27.5–35.5)
APTT BLD: 35.6 SEC — HIGH (ref 27.5–35.5)
AST SERPL-CCNC: 9 U/L — LOW (ref 10–40)
B-OH-BUTYR SERPL-SCNC: 1 MMOL/L — HIGH
B-OH-BUTYR SERPL-SCNC: 3.2 MMOL/L — HIGH
BILIRUB SERPL-MCNC: 0.2 MG/DL — SIGNIFICANT CHANGE UP (ref 0.2–1.2)
BLD GP AB SCN SERPL QL: NEGATIVE — SIGNIFICANT CHANGE UP
BLD GP AB SCN SERPL QL: NEGATIVE — SIGNIFICANT CHANGE UP
BUN SERPL-MCNC: 37 MG/DL — HIGH (ref 7–23)
BUN SERPL-MCNC: 39 MG/DL — HIGH (ref 7–23)
BUN SERPL-MCNC: 40 MG/DL — HIGH (ref 7–23)
CALCIUM SERPL-MCNC: 10 MG/DL — SIGNIFICANT CHANGE UP (ref 8.4–10.5)
CALCIUM SERPL-MCNC: 10.1 MG/DL — SIGNIFICANT CHANGE UP (ref 8.4–10.5)
CALCIUM SERPL-MCNC: 9.8 MG/DL — SIGNIFICANT CHANGE UP (ref 8.4–10.5)
CHLORIDE SERPL-SCNC: 100 MMOL/L — SIGNIFICANT CHANGE UP (ref 96–108)
CHLORIDE SERPL-SCNC: 96 MMOL/L — SIGNIFICANT CHANGE UP (ref 96–108)
CHLORIDE SERPL-SCNC: 98 MMOL/L — SIGNIFICANT CHANGE UP (ref 96–108)
CO2 SERPL-SCNC: 20 MMOL/L — LOW (ref 22–31)
CO2 SERPL-SCNC: 20 MMOL/L — LOW (ref 22–31)
CO2 SERPL-SCNC: 21 MMOL/L — LOW (ref 22–31)
CREAT SERPL-MCNC: 0.97 MG/DL — SIGNIFICANT CHANGE UP (ref 0.5–1.3)
CREAT SERPL-MCNC: 1.01 MG/DL — SIGNIFICANT CHANGE UP (ref 0.5–1.3)
CREAT SERPL-MCNC: 1.12 MG/DL — SIGNIFICANT CHANGE UP (ref 0.5–1.3)
CULTURE RESULTS: SIGNIFICANT CHANGE UP
CULTURE RESULTS: SIGNIFICANT CHANGE UP
GGT SERPL-CCNC: 24 U/L — SIGNIFICANT CHANGE UP (ref 9–50)
GLUCOSE BLDC GLUCOMTR-MCNC: 188 MG/DL — HIGH (ref 70–99)
GLUCOSE BLDC GLUCOMTR-MCNC: 204 MG/DL — HIGH (ref 70–99)
GLUCOSE BLDC GLUCOMTR-MCNC: 206 MG/DL — HIGH (ref 70–99)
GLUCOSE BLDC GLUCOMTR-MCNC: 209 MG/DL — HIGH (ref 70–99)
GLUCOSE BLDC GLUCOMTR-MCNC: 220 MG/DL — HIGH (ref 70–99)
GLUCOSE BLDC GLUCOMTR-MCNC: 239 MG/DL — HIGH (ref 70–99)
GLUCOSE BLDC GLUCOMTR-MCNC: 240 MG/DL — HIGH (ref 70–99)
GLUCOSE BLDC GLUCOMTR-MCNC: 245 MG/DL — HIGH (ref 70–99)
GLUCOSE BLDC GLUCOMTR-MCNC: 255 MG/DL — HIGH (ref 70–99)
GLUCOSE BLDC GLUCOMTR-MCNC: 256 MG/DL — HIGH (ref 70–99)
GLUCOSE BLDC GLUCOMTR-MCNC: 269 MG/DL — HIGH (ref 70–99)
GLUCOSE BLDC GLUCOMTR-MCNC: 269 MG/DL — HIGH (ref 70–99)
GLUCOSE BLDC GLUCOMTR-MCNC: 289 MG/DL — HIGH (ref 70–99)
GLUCOSE BLDC GLUCOMTR-MCNC: 291 MG/DL — HIGH (ref 70–99)
GLUCOSE BLDC GLUCOMTR-MCNC: 338 MG/DL — HIGH (ref 70–99)
GLUCOSE BLDC GLUCOMTR-MCNC: 362 MG/DL — HIGH (ref 70–99)
GLUCOSE BLDC GLUCOMTR-MCNC: 367 MG/DL — HIGH (ref 70–99)
GLUCOSE SERPL-MCNC: 223 MG/DL — HIGH (ref 70–99)
GLUCOSE SERPL-MCNC: 283 MG/DL — HIGH (ref 70–99)
GLUCOSE SERPL-MCNC: 341 MG/DL — HIGH (ref 70–99)
GRAM STN FLD: SIGNIFICANT CHANGE UP
HCT VFR BLD CALC: 35.2 % — LOW (ref 39–50)
HGB BLD-MCNC: 12 G/DL — LOW (ref 13–17)
INR BLD: 1.24 — HIGH (ref 0.88–1.16)
INR BLD: 1.24 — HIGH (ref 0.88–1.16)
LACTATE SERPL-SCNC: 1.6 MMOL/L — SIGNIFICANT CHANGE UP (ref 0.5–2)
MAGNESIUM SERPL-MCNC: 2.1 MG/DL — SIGNIFICANT CHANGE UP (ref 1.6–2.6)
MAGNESIUM SERPL-MCNC: 2.2 MG/DL — SIGNIFICANT CHANGE UP (ref 1.6–2.6)
MAGNESIUM SERPL-MCNC: 2.3 MG/DL — SIGNIFICANT CHANGE UP (ref 1.6–2.6)
MCHC RBC-ENTMCNC: 28.9 PG — SIGNIFICANT CHANGE UP (ref 27–34)
MCHC RBC-ENTMCNC: 34.1 GM/DL — SIGNIFICANT CHANGE UP (ref 32–36)
MCV RBC AUTO: 84.8 FL — SIGNIFICANT CHANGE UP (ref 80–100)
NRBC # BLD: 0 /100 WBCS — SIGNIFICANT CHANGE UP (ref 0–0)
PHOSPHATE SERPL-MCNC: 2.2 MG/DL — LOW (ref 2.5–4.5)
PHOSPHATE SERPL-MCNC: 2.2 MG/DL — LOW (ref 2.5–4.5)
PHOSPHATE SERPL-MCNC: 2.3 MG/DL — LOW (ref 2.5–4.5)
PLATELET # BLD AUTO: 570 K/UL — HIGH (ref 150–400)
POTASSIUM SERPL-MCNC: 4.1 MMOL/L — SIGNIFICANT CHANGE UP (ref 3.5–5.3)
POTASSIUM SERPL-MCNC: 4.2 MMOL/L — SIGNIFICANT CHANGE UP (ref 3.5–5.3)
POTASSIUM SERPL-MCNC: 4.9 MMOL/L — SIGNIFICANT CHANGE UP (ref 3.5–5.3)
POTASSIUM SERPL-SCNC: 4.1 MMOL/L — SIGNIFICANT CHANGE UP (ref 3.5–5.3)
POTASSIUM SERPL-SCNC: 4.2 MMOL/L — SIGNIFICANT CHANGE UP (ref 3.5–5.3)
POTASSIUM SERPL-SCNC: 4.9 MMOL/L — SIGNIFICANT CHANGE UP (ref 3.5–5.3)
PROT SERPL-MCNC: 9.1 G/DL — HIGH (ref 6–8.3)
PROTHROM AB SERPL-ACNC: 14.7 SEC — HIGH (ref 10.6–13.6)
PROTHROM AB SERPL-ACNC: 14.7 SEC — HIGH (ref 10.6–13.6)
RBC # BLD: 4.15 M/UL — LOW (ref 4.2–5.8)
RBC # FLD: 12.8 % — SIGNIFICANT CHANGE UP (ref 10.3–14.5)
RH IG SCN BLD-IMP: POSITIVE — SIGNIFICANT CHANGE UP
RH IG SCN BLD-IMP: POSITIVE — SIGNIFICANT CHANGE UP
SODIUM SERPL-SCNC: 134 MMOL/L — LOW (ref 135–145)
SODIUM SERPL-SCNC: 135 MMOL/L — SIGNIFICANT CHANGE UP (ref 135–145)
SODIUM SERPL-SCNC: 136 MMOL/L — SIGNIFICANT CHANGE UP (ref 135–145)
SPECIMEN SOURCE: SIGNIFICANT CHANGE UP
WBC # BLD: 14.64 K/UL — HIGH (ref 3.8–10.5)
WBC # FLD AUTO: 14.64 K/UL — HIGH (ref 3.8–10.5)

## 2021-12-01 PROCEDURE — 73630 X-RAY EXAM OF FOOT: CPT | Mod: 26,RT

## 2021-12-01 PROCEDURE — 99223 1ST HOSP IP/OBS HIGH 75: CPT | Mod: GC

## 2021-12-01 PROCEDURE — 99222 1ST HOSP IP/OBS MODERATE 55: CPT | Mod: GC

## 2021-12-01 PROCEDURE — 73701 CT LOWER EXTREMITY W/DYE: CPT | Mod: 26,RT

## 2021-12-01 PROCEDURE — 88305 TISSUE EXAM BY PATHOLOGIST: CPT | Mod: 26

## 2021-12-01 PROCEDURE — 99233 SBSQ HOSP IP/OBS HIGH 50: CPT | Mod: GC

## 2021-12-01 PROCEDURE — 73610 X-RAY EXAM OF ANKLE: CPT | Mod: 26,RT

## 2021-12-01 PROCEDURE — G9005: CPT

## 2021-12-01 RX ORDER — GLUCAGON INJECTION, SOLUTION 0.5 MG/.1ML
1 INJECTION, SOLUTION SUBCUTANEOUS ONCE
Refills: 0 | Status: DISCONTINUED | OUTPATIENT
Start: 2021-12-01 | End: 2021-12-07

## 2021-12-01 RX ORDER — SODIUM CHLORIDE 9 MG/ML
1000 INJECTION, SOLUTION INTRAVENOUS
Refills: 0 | Status: DISCONTINUED | OUTPATIENT
Start: 2021-12-01 | End: 2021-12-01

## 2021-12-01 RX ORDER — INSULIN HUMAN 100 [IU]/ML
INJECTION, SOLUTION SUBCUTANEOUS EVERY 6 HOURS
Refills: 0 | Status: DISCONTINUED | OUTPATIENT
Start: 2021-12-01 | End: 2021-12-04

## 2021-12-01 RX ORDER — INSULIN HUMAN 100 [IU]/ML
10 INJECTION, SOLUTION SUBCUTANEOUS ONCE
Refills: 0 | Status: COMPLETED | OUTPATIENT
Start: 2021-12-01 | End: 2021-12-01

## 2021-12-01 RX ORDER — INSULIN LISPRO 100/ML
10 VIAL (ML) SUBCUTANEOUS
Refills: 0 | Status: DISCONTINUED | OUTPATIENT
Start: 2021-12-01 | End: 2021-12-02

## 2021-12-01 RX ORDER — SODIUM CHLORIDE 9 MG/ML
1000 INJECTION, SOLUTION INTRAVENOUS
Refills: 0 | Status: DISCONTINUED | OUTPATIENT
Start: 2021-12-01 | End: 2021-12-07

## 2021-12-01 RX ORDER — HUMAN INSULIN 100 [IU]/ML
10 INJECTION, SUSPENSION SUBCUTANEOUS ONCE
Refills: 0 | Status: COMPLETED | OUTPATIENT
Start: 2021-12-01 | End: 2021-12-01

## 2021-12-01 RX ORDER — OXYCODONE HYDROCHLORIDE 5 MG/1
5 TABLET ORAL EVERY 6 HOURS
Refills: 0 | Status: DISCONTINUED | OUTPATIENT
Start: 2021-12-01 | End: 2021-12-07

## 2021-12-01 RX ORDER — INSULIN GLARGINE 100 [IU]/ML
35 INJECTION, SOLUTION SUBCUTANEOUS AT BEDTIME
Refills: 0 | Status: DISCONTINUED | OUTPATIENT
Start: 2021-12-01 | End: 2021-12-02

## 2021-12-01 RX ORDER — DEXTROSE 50 % IN WATER 50 %
15 SYRINGE (ML) INTRAVENOUS ONCE
Refills: 0 | Status: DISCONTINUED | OUTPATIENT
Start: 2021-12-01 | End: 2021-12-07

## 2021-12-01 RX ADMIN — SODIUM CHLORIDE 250 MILLILITER(S): 9 INJECTION, SOLUTION INTRAVENOUS at 03:02

## 2021-12-01 RX ADMIN — Medication 250 MILLIGRAM(S): at 14:01

## 2021-12-01 RX ADMIN — INSULIN HUMAN 6: 100 INJECTION, SOLUTION SUBCUTANEOUS at 23:05

## 2021-12-01 RX ADMIN — Medication 63.75 MILLIMOLE(S): at 12:24

## 2021-12-01 RX ADMIN — Medication 250 MILLIGRAM(S): at 01:51

## 2021-12-01 RX ADMIN — PIPERACILLIN AND TAZOBACTAM 200 GRAM(S): 4; .5 INJECTION, POWDER, LYOPHILIZED, FOR SOLUTION INTRAVENOUS at 22:12

## 2021-12-01 RX ADMIN — INSULIN GLARGINE 35 UNIT(S): 100 INJECTION, SOLUTION SUBCUTANEOUS at 23:46

## 2021-12-01 RX ADMIN — PANTOPRAZOLE SODIUM 40 MILLIGRAM(S): 20 TABLET, DELAYED RELEASE ORAL at 03:29

## 2021-12-01 RX ADMIN — HUMAN INSULIN 10 UNIT(S): 100 INJECTION, SUSPENSION SUBCUTANEOUS at 11:01

## 2021-12-01 RX ADMIN — INSULIN HUMAN 10 UNIT(S): 100 INJECTION, SOLUTION SUBCUTANEOUS at 17:14

## 2021-12-01 RX ADMIN — Medication 10 UNIT(S): at 19:32

## 2021-12-01 RX ADMIN — PIPERACILLIN AND TAZOBACTAM 200 GRAM(S): 4; .5 INJECTION, POWDER, LYOPHILIZED, FOR SOLUTION INTRAVENOUS at 03:29

## 2021-12-01 RX ADMIN — PIPERACILLIN AND TAZOBACTAM 200 GRAM(S): 4; .5 INJECTION, POWDER, LYOPHILIZED, FOR SOLUTION INTRAVENOUS at 10:38

## 2021-12-01 RX ADMIN — INSULIN HUMAN 8: 100 INJECTION, SOLUTION SUBCUTANEOUS at 17:14

## 2021-12-01 RX ADMIN — ATORVASTATIN CALCIUM 20 MILLIGRAM(S): 80 TABLET, FILM COATED ORAL at 23:04

## 2021-12-01 RX ADMIN — PIPERACILLIN AND TAZOBACTAM 200 GRAM(S): 4; .5 INJECTION, POWDER, LYOPHILIZED, FOR SOLUTION INTRAVENOUS at 16:28

## 2021-12-01 NOTE — PROGRESS NOTE ADULT - ASSESSMENT
Mr. Barragan is a 45 y/o male with PMHx of poorly controlled IDDM and diabetic neuropathy BIBEMS for CP, found to have DKA, admitted to MICU for insulin gtt and severe sepsis 2/2 possible OM of R foot. Anion gap closed x2 as of 12/01, off Insulin gtt, now stable for stepdown to [facility]. Patient is pre-op for partial R foot amputation by podiatry on 12/01.

## 2021-12-01 NOTE — PROGRESS NOTE ADULT - SUBJECTIVE AND OBJECTIVE BOX
Patient is a 46y old  Male who presents with a chief complaint of DKA (01 Dec 2021 05:48)      INTERVAL HPI/ OVERNIGHT EVENTS      LABS                        12.0   14.64 )-----------( 570      ( 01 Dec 2021 03:26 )             35.2     12-01    135  |  98  |  39<H>  ----------------------------<  283<H>  4.1   |  20<L>  |  1.01    Ca    9.8      01 Dec 2021 03:26  Phos  2.2     12-01  Mg     2.3     12-01    TPro  9.1<H>  /  Alb  3.2<L>  /  TBili  0.2  /  DBili  x   /  AST  9<L>  /  ALT  8<L>  /  AlkPhos  191<H>  12-01    PT/INR - ( 01 Dec 2021 03:26 )   PT: 14.7 sec;   INR: 1.24          PTT - ( 01 Dec 2021 03:26 )  PTT:35.6 sec    ICU Vital Signs Last 24 Hrs  T(C): 36.4 (01 Dec 2021 06:08), Max: 36.6 (30 Nov 2021 12:43)  T(F): 97.6 (01 Dec 2021 06:08), Max: 97.9 (30 Nov 2021 12:43)  HR: 99 (01 Dec 2021 05:00) (99 - 117)  BP: 129/85 (01 Dec 2021 05:00) (117/79 - 147/104)  BP(mean): 101 (01 Dec 2021 05:00) (91 - 112)  ABP: --  ABP(mean): --  RR: 18 (01 Dec 2021 05:00) (10 - 24)  SpO2: 97% (01 Dec 2021 05:00) (95% - 100%)      RADIOLOGY    MICROBIOLOGY    PHYSICAL EXAM  Lower Extremity Focused  Vasc:  Derm:  Neuro:  MSK: Patient is a 46y old  Male who presents with a chief complaint of DKA (01 Dec 2021 05:48)      INTERVAL HPI/ OVERNIGHT EVENTS: Pt evaluated at bedside. Pt notes improved symptoms this AM, however has continued pain to foot. Pt is awaiting surgery. Estelita CP/SOB      LABS                        12.0   14.64 )-----------( 570      ( 01 Dec 2021 03:26 )             35.2     12-01    135  |  98  |  39<H>  ----------------------------<  283<H>  4.1   |  20<L>  |  1.01    Ca    9.8      01 Dec 2021 03:26  Phos  2.2     12-01  Mg     2.3     12-01    TPro  9.1<H>  /  Alb  3.2<L>  /  TBili  0.2  /  DBili  x   /  AST  9<L>  /  ALT  8<L>  /  AlkPhos  191<H>  12-01    PT/INR - ( 01 Dec 2021 03:26 )   PT: 14.7 sec;   INR: 1.24          PTT - ( 01 Dec 2021 03:26 )  PTT:35.6 sec    ICU Vital Signs Last 24 Hrs  T(C): 36.4 (01 Dec 2021 06:08), Max: 36.6 (30 Nov 2021 12:43)  T(F): 97.6 (01 Dec 2021 06:08), Max: 97.9 (30 Nov 2021 12:43)  HR: 99 (01 Dec 2021 05:00) (99 - 117)  BP: 129/85 (01 Dec 2021 05:00) (117/79 - 147/104)  BP(mean): 101 (01 Dec 2021 05:00) (91 - 112)  ABP: --  ABP(mean): --  RR: 18 (01 Dec 2021 05:00) (10 - 24)  SpO2: 97% (01 Dec 2021 05:00) (95% - 100%)      RADIOLOGY    MICROBIOLOGY    PHYSICAL EXAM  Vasc: Palpable pulses, edematous left foot  Derm: Ulcers present on the lateral and dorsal aspect of the right foot. Purulent drainage noted (about 5cc's expressed). Extremely malodorous with sloughing skin. Ischemic changes noted on the 5th digit. Calluses present on the plantar aspect of the foot on submet 1 and submet 5. Erythema present dorsally over 2-5 mets.   Neuro: Protective sensations diminished   MSK: MMSE 5/5 in all compartments. Amputation of all digits on the left foot

## 2021-12-01 NOTE — BRIEF OPERATIVE NOTE - NSICDXBRIEFPROCEDURE_GEN_ALL_CORE_FT
PROCEDURES:  Partial amputation of fifth ray of right foot by open approach 01-Dec-2021 20:43:49  Leon Bates  Deep incision and drainage of multiple areas of foot 01-Dec-2021 20:45:11 Dorsal I&D at two intermetatarsal spaces Leon Bates

## 2021-12-01 NOTE — DIETITIAN INITIAL EVALUATION ADULT. - OTHER INFO
47 y/o male with PMHx of poorly controlled IDDM and diabetic neuropathy BIBEMS for CP, found to have DKA, admitted to MICU 11/30 for insulin gtt and severe sepsis 2/2 possible osteomyelitis of R foot. Anion gap closed x2 as of 12/01 7:30AM, off insulin gtt. Patient is pre-op for partial R foot amputation by podiatry on 12/01.     On assessment, pt was resting in bed, agitated because unable to drink water due to NPO status. Assessment and education limited d/t agitation. No n/v/d/c as per flowsheets, no reported +BM since admission. Skin: Maury 15, unstageable diabetic ulcer on L&R foot. Right foot is malodorous and 5ccs of purulent drainage was expressed from the proximal aspect of the ulcer. No edema present. Pt was on insulin drip at time of assessment but it has since been discontinued. Pt discussed at rounds, denies any family states but endorsed being noncompliant with insulin regimen. Attempted to assess and educate but unsuccessful d/t agitation. Pt would benefit from in-depth nutrition/DM education. RD to follow. 45 y/o male with PMHx of poorly controlled IDDM and diabetic neuropathy BIBEMS for CP, found to have DKA, admitted to MICU 11/30 for insulin gtt and severe sepsis 2/2 possible osteomyelitis of R foot. Anion gap closed x2 as of 12/01 7:30AM, off insulin gtt. Patient is pre-op for partial R foot amputation by podiatry on 12/01.     On assessment, pt was resting in bed, agitated because unable to drink water due to NPO status. Assessment and education limited d/t agitation. No n/v/d/c as per flowsheets, no reported +BM since admission. Skin: Maury 15, unstageable diabetic ulcer on L&R foot. Right foot is malodorous and 5ccs of purulent drainage was expressed from the proximal aspect of the ulcer. No edema present. Pt was on insulin drip at time of assessment but it has since been discontinued. Pt discussed at rounds, denies any family but endorsed being noncompliant with insulin regimen. Pt would benefit from in-depth nutrition/DM education. RD to follow.

## 2021-12-01 NOTE — PROGRESS NOTE ADULT - PROBLEM SELECTOR PLAN 4
According to med rec pt prescribed Atorvastatin 20mg qhs, though denies h/o HLD and did not report taking this medication.  - c/w atorvastatin 20mg qhs According to med rec pt prescribed Atorvastatin 20mg qhs, though denies h/o HLD and did not report taking this medication.  - c/w atorvastatin 20mg qhs  -f/u lipid panel in AM

## 2021-12-01 NOTE — PROGRESS NOTE ADULT - PROBLEM SELECTOR PLAN 2
Patient initially admitted for DKA with glucose 912 on admission w/ AG 31, K 6.8, bicarb 11, BHB 6.1. Pt diagnosed w/ DM last year 11/2020 when he was hospitalized for DKA, also had R toe amputation during that hospitalization. Reportedly takes insulin at home but only intermittently compliant. Reportedly takes basaglar 40u qhs, novolog 1u TID, but prescribed 50u qhs, 15u TID. Meds last picked up 8/2021.  - off insulin drip , DKA resolved   - A1c 14.3%  - endocrine consulted, pending recs  -monitor fingerstick glucose readings Patient initially admitted for DKA with glucose 912 on admission w/ AG 31, K 6.8, bicarb 11, BHB 6.1. Pt diagnosed w/ DM last year 11/2020 when he was hospitalized for DKA, also had R toe amputation during that hospitalization. Reportedly takes insulin at home but only intermittently compliant. Reportedly takes basaglar 40u qhs, novolog 1u TID, but prescribed 50u qhs, 15u TID. Meds last picked up 8/2021.  - off insulin drip , DKA resolved   - A1c 14.3%  -c/w SS for now   - endocrine consulted, pending recs  -monitor fingerstick glucose readings

## 2021-12-01 NOTE — PROGRESS NOTE ADULT - SUBJECTIVE AND OBJECTIVE BOX
S/Overnight events:        Hospital Course:   POD#       Vital Signs Last 24 Hrs  T(C): 36.3 (01 Dec 2021 09:00), Max: 36.6 (2021 12:43)  T(F): 97.4 (01 Dec 2021 09:00), Max: 97.9 (2021 12:43)  HR: 91 (01 Dec 2021 12:00) (91 - 117)  BP: 139/82 (01 Dec 2021 12:00) (117/79 - 147/104)  BP(mean): 103 (01 Dec 2021 12:00) (91 - 112)  RR: 18 (01 Dec 2021 05:00) (10 - 24)  SpO2: 98% (01 Dec 2021 12:00) (95% - 100%)    I&O's Detail    2021 07:01  -  01 Dec 2021 07:00  --------------------------------------------------------  IN:    Insulin: 22.1 mL  Total IN: 22.1 mL    OUT:    Voided (mL): 1030 mL  Total OUT: 1030 mL    Total NET: -1007.9 mL      01 Dec 2021 07:01  -  01 Dec 2021 12:24  --------------------------------------------------------  IN:    dextrose 5% + sodium chloride 0.45% w/ Additives: 1500 mL    Insulin: 42.9 mL    IV PiggyBack: 100 mL  Total IN: 1642.9 mL    OUT:  Total OUT: 0 mL    Total NET: 1642.9 mL        I&O's Summary    2021 07:  -  01 Dec 2021 07:00  --------------------------------------------------------  IN: 22.1 mL / OUT: 1030 mL / NET: -1007.9 mL    01 Dec 2021 07:01  -  01 Dec 2021 12:24  --------------------------------------------------------  IN: 1642.9 mL / OUT: 0 mL / NET: 1642.9 mL        PHYSICAL EXAM:  Gen:  HEENT:  Neck:  Lungs:  Heart:  Abd:  Exts:  Neuro:  Wound/Drains:    TUBES/LINES:  [] CVC  [] A-line  [] Lumbar Drain  [] Ventriculostomy  [] Mcginnis  [] Other    DIET:  [] NPO  [] Mechanical  [] Tube feeds    LABS:                        12.0   14.64 )-----------( 570      ( 01 Dec 2021 03:26 )             35.2     12-    136  |  100  |  37<H>  ----------------------------<  223<H>  4.2   |  21<L>  |  1.12    Ca    10.1      01 Dec 2021 07:34  Phos  2.2     12-  Mg     2.1     12    TPro  9.1<H>  /  Alb  3.2<L>  /  TBili  0.2  /  DBili  x   /  AST  9<L>  /  ALT  8<L>  /  AlkPhos  191<H>  12-    PT/INR - ( 01 Dec 2021 03:26 )   PT: 14.7 sec;   INR: 1.24          PTT - ( 01 Dec 2021 03:26 )  PTT:35.6 sec  Urinalysis Basic - ( 2021 15:59 )    Color: Yellow / Appearance: Clear / S.020 / pH: x  Gluc: x / Ketone: >=80 mg/dL  / Bili: Small / Urobili: 0.2 E.U./dL   Blood: x / Protein: NEGATIVE mg/dL / Nitrite: NEGATIVE   Leuk Esterase: NEGATIVE / RBC: < 5 /HPF / WBC < 5 /HPF   Sq Epi: x / Non Sq Epi: 0-5 /HPF / Bacteria: Present /HPF      CARDIAC MARKERS ( 2021 19:01 )  x     / 0.01 ng/mL / x     / x     / x          CAPILLARY BLOOD GLUCOSE      POCT Blood Glucose.: 220 mg/dL (01 Dec 2021 12:11)  POCT Blood Glucose.: 206 mg/dL (01 Dec 2021 11:06)  POCT Blood Glucose.: 204 mg/dL (01 Dec 2021 10:16)  POCT Blood Glucose.: 239 mg/dL (01 Dec 2021 09:06)  POCT Blood Glucose.: 209 mg/dL (01 Dec 2021 08:06)  POCT Blood Glucose.: 256 mg/dL (01 Dec 2021 07:06)  POCT Blood Glucose.: 188 mg/dL (01 Dec 2021 06:33)  POCT Blood Glucose.: 240 mg/dL (01 Dec 2021 05:09)  POCT Blood Glucose.: 245 mg/dL (01 Dec 2021 04:06)  POCT Blood Glucose.: 269 mg/dL (01 Dec 2021 03:14)  POCT Blood Glucose.: 269 mg/dL (01 Dec 2021 02:20)  POCT Blood Glucose.: 291 mg/dL (01 Dec 2021 01:21)  POCT Blood Glucose.: 289 mg/dL (01 Dec 2021 00:26)  POCT Blood Glucose.: 344 mg/dL (2021 23:18)  POCT Blood Glucose.: 316 mg/dL (2021 22:07)  POCT Blood Glucose.: 369 mg/dL (2021 21:06)  POCT Blood Glucose.: 375 mg/dL (2021 20:05)  POCT Blood Glucose.: 544 mg/dL (2021 19:02)  POCT Blood Glucose.: 472 mg/dL (2021 18:31)  POCT Blood Glucose.: 559 mg/dL (2021 16:30)  POCT Blood Glucose.: >600 mg/dL (2021 15:36)  POCT Blood Glucose.: >600 mg/dL (2021 15:34)  POCT Blood Glucose.: >600 mg/dL (2021 14:34)  POCT Blood Glucose.: >600 mg/dL (2021 12:55)  POCT Blood Glucose.: >600 mg/dL (2021 12:54)      Drug Levels: [] N/A    CSF Analysis: [] N/A      Allergies    No Known Allergies    Intolerances      MEDICATIONS:  Antibiotics:  piperacillin/tazobactam IVPB.. 3.375 Gram(s) IV Intermittent every 6 hours  vancomycin  IVPB 1000 milliGRAM(s) IV Intermittent every 12 hours    Neuro:  acetaminophen     Tablet .. 650 milliGRAM(s) Oral every 6 hours PRN    Anticoagulation:    OTHER:  atorvastatin 20 milliGRAM(s) Oral at bedtime  calcium carbonate    500 mG (Tums) Chewable 1 Tablet(s) Chew three times a day PRN  dextrose 50% Injectable 50 milliLiter(s) IV Push every 15 minutes  glucagon  Injectable 1 milliGRAM(s) IntraMuscular once  influenza   Vaccine 0.5 milliLiter(s) IntraMuscular once  insulin regular Infusion 7 Unit(s)/Hr IV Continuous <Continuous>  pantoprazole    Tablet 40 milliGRAM(s) Oral before breakfast  simethicone 80 milliGRAM(s) Chew two times a day PRN    IVF:  dextrose 5% + sodium chloride 0.45% 1000 milliLiter(s) IV Continuous <Continuous>  dextrose 5%. 1000 milliLiter(s) IV Continuous <Continuous>  sodium phosphate IVPB 15 milliMole(s) IV Intermittent once    CULTURES:    RADIOLOGY & ADDITIONAL TESTS:      ASSESSMENT:  46y Male s/p      PLAN:  NEURO:    CARDIOVASCULAR:    PULMONARY:    RENAL:    GI:    ID:    ENDO:    DVT PROPHYLAXIS:    DISPOSITION:       Assessment:  Present when checked    []  GCS  E   V  M     Heart Failure: []Acute, [] acute on chronic , []chronic  Heart Failure:  [] Diastolic (HFpEF), [] Systolic (HFrEF), []Combined (HFpEF and HFrEF), [] RHF, [] Pulm HTN, [] Other    [] SANDY, [] ATN, [] AIN, [] other  [] CKD1, [] CKD2, [] CKD 3, [] CKD 4, [] CKD 5, []ESRD    Encephalopathy: [] Metabolic, [] Hepatic, [] toxic, [] Neurological, [] Other    Abnormal Nurtitional Status: [] malnurtition (see nutrition note), [ ]underweight: BMI < 19, [] morbid obesity: BMI >40, [] Cachexia    [] Sepsis  [] hypovolemic shock,[] cardiogenic shock, [] hemorrhagic shock, [] neuogenic shock  [] Acute Respiratory Failure  []Cerebral edema, [] Brain compression/ herniation,   [] Functional quadriplegia  [] Acute blood loss anemia   MICU TRANSFER NOTE:      S/Overnight events:        Hospital Course:         Vital Signs Last 24 Hrs  T(C): 36.3 (01 Dec 2021 09:00), Max: 36.6 (2021 12:43)  T(F): 97.4 (01 Dec 2021 09:00), Max: 97.9 (2021 12:43)  HR: 91 (01 Dec 2021 12:00) (91 - 117)  BP: 139/82 (01 Dec 2021 12:00) (117/79 - 147/104)  BP(mean): 103 (01 Dec 2021 12:00) (91 - 112)  RR: 18 (01 Dec 2021 05:00) (10 - 24)  SpO2: 98% (01 Dec 2021 12:00) (95% - 100%)    I&O's Detail    2021 07:01  -  01 Dec 2021 07:00  --------------------------------------------------------  IN:    Insulin: 22.1 mL  Total IN: 22.1 mL    OUT:    Voided (mL): 1030 mL  Total OUT: 1030 mL    Total NET: -1007.9 mL      01 Dec 2021 07:01  -  01 Dec 2021 12:24  --------------------------------------------------------  IN:    dextrose 5% + sodium chloride 0.45% w/ Additives: 1500 mL    Insulin: 42.9 mL    IV PiggyBack: 100 mL  Total IN: 1642.9 mL    OUT:  Total OUT: 0 mL    Total NET: 1642.9 mL        I&O's Summary    2021 07:01  -  01 Dec 2021 07:00  --------------------------------------------------------  IN: 22.1 mL / OUT: 1030 mL / NET: -1007.9 mL    01 Dec 2021 07:01  -  01 Dec 2021 12:24  --------------------------------------------------------  IN: 1642.9 mL / OUT: 0 mL / NET: 1642.9 mL        PHYSICAL EXAM:  Gen:  HEENT:  Neck:  Lungs:  Heart:  Abd:  Exts:  Neuro:  Wound/Drains:    TUBES/LINES:  [] CVC  [] A-line  [] Lumbar Drain  [] Ventriculostomy  [] Mcginnis  [] Other    DIET:  [] NPO  [] Mechanical  [] Tube feeds    LABS:                        12.0   14.64 )-----------( 570      ( 01 Dec 2021 03:26 )             35.2     12-    136  |  100  |  37<H>  ----------------------------<  223<H>  4.2   |  21<L>  |  1.12    Ca    10.1      01 Dec 2021 07:34  Phos  2.2     12  Mg     2.1         TPro  9.1<H>  /  Alb  3.2<L>  /  TBili  0.2  /  DBili  x   /  AST  9<L>  /  ALT  8<L>  /  AlkPhos  191<H>  12    PT/INR - ( 01 Dec 2021 03:26 )   PT: 14.7 sec;   INR: 1.24          PTT - ( 01 Dec 2021 03:26 )  PTT:35.6 sec  Urinalysis Basic - ( 2021 15:59 )    Color: Yellow / Appearance: Clear / S.020 / pH: x  Gluc: x / Ketone: >=80 mg/dL  / Bili: Small / Urobili: 0.2 E.U./dL   Blood: x / Protein: NEGATIVE mg/dL / Nitrite: NEGATIVE   Leuk Esterase: NEGATIVE / RBC: < 5 /HPF / WBC < 5 /HPF   Sq Epi: x / Non Sq Epi: 0-5 /HPF / Bacteria: Present /HPF      CARDIAC MARKERS ( 2021 19:01 )  x     / 0.01 ng/mL / x     / x     / x          CAPILLARY BLOOD GLUCOSE      POCT Blood Glucose.: 220 mg/dL (01 Dec 2021 12:11)  POCT Blood Glucose.: 206 mg/dL (01 Dec 2021 11:06)  POCT Blood Glucose.: 204 mg/dL (01 Dec 2021 10:16)  POCT Blood Glucose.: 239 mg/dL (01 Dec 2021 09:06)  POCT Blood Glucose.: 209 mg/dL (01 Dec 2021 08:06)  POCT Blood Glucose.: 256 mg/dL (01 Dec 2021 07:06)  POCT Blood Glucose.: 188 mg/dL (01 Dec 2021 06:33)  POCT Blood Glucose.: 240 mg/dL (01 Dec 2021 05:09)  POCT Blood Glucose.: 245 mg/dL (01 Dec 2021 04:06)  POCT Blood Glucose.: 269 mg/dL (01 Dec 2021 03:14)  POCT Blood Glucose.: 269 mg/dL (01 Dec 2021 02:20)  POCT Blood Glucose.: 291 mg/dL (01 Dec 2021 01:21)  POCT Blood Glucose.: 289 mg/dL (01 Dec 2021 00:26)  POCT Blood Glucose.: 344 mg/dL (2021 23:18)  POCT Blood Glucose.: 316 mg/dL (2021 22:07)  POCT Blood Glucose.: 369 mg/dL (2021 21:06)  POCT Blood Glucose.: 375 mg/dL (2021 20:05)  POCT Blood Glucose.: 544 mg/dL (2021 19:02)  POCT Blood Glucose.: 472 mg/dL (2021 18:31)  POCT Blood Glucose.: 559 mg/dL (2021 16:30)  POCT Blood Glucose.: >600 mg/dL (2021 15:36)  POCT Blood Glucose.: >600 mg/dL (2021 15:34)  POCT Blood Glucose.: >600 mg/dL (2021 14:34)  POCT Blood Glucose.: >600 mg/dL (2021 12:55)  POCT Blood Glucose.: >600 mg/dL (2021 12:54)      Drug Levels: [] N/A    CSF Analysis: [] N/A      Allergies    No Known Allergies    Intolerances      MEDICATIONS:  Antibiotics:  piperacillin/tazobactam IVPB.. 3.375 Gram(s) IV Intermittent every 6 hours  vancomycin  IVPB 1000 milliGRAM(s) IV Intermittent every 12 hours    Neuro:  acetaminophen     Tablet .. 650 milliGRAM(s) Oral every 6 hours PRN    Anticoagulation:    OTHER:  atorvastatin 20 milliGRAM(s) Oral at bedtime  calcium carbonate    500 mG (Tums) Chewable 1 Tablet(s) Chew three times a day PRN  dextrose 50% Injectable 50 milliLiter(s) IV Push every 15 minutes  glucagon  Injectable 1 milliGRAM(s) IntraMuscular once  influenza   Vaccine 0.5 milliLiter(s) IntraMuscular once  insulin regular Infusion 7 Unit(s)/Hr IV Continuous <Continuous>  pantoprazole    Tablet 40 milliGRAM(s) Oral before breakfast  simethicone 80 milliGRAM(s) Chew two times a day PRN    IVF:  dextrose 5% + sodium chloride 0.45% 1000 milliLiter(s) IV Continuous <Continuous>  dextrose 5%. 1000 milliLiter(s) IV Continuous <Continuous>  sodium phosphate IVPB 15 milliMole(s) IV Intermittent once    CULTURES:    RADIOLOGY & ADDITIONAL TESTS:      ASSESSMENT:  46y Male s/p      PLAN:  NEURO:    CARDIOVASCULAR:    PULMONARY:    RENAL:    GI:    ID:    ENDO:    DVT PROPHYLAXIS:    DISPOSITION:       Assessment:  Present when checked    []  GCS  E   V  M     Heart Failure: []Acute, [] acute on chronic , []chronic  Heart Failure:  [] Diastolic (HFpEF), [] Systolic (HFrEF), []Combined (HFpEF and HFrEF), [] RHF, [] Pulm HTN, [] Other    [] SANDY, [] ATN, [] AIN, [] other  [] CKD1, [] CKD2, [] CKD 3, [] CKD 4, [] CKD 5, []ESRD    Encephalopathy: [] Metabolic, [] Hepatic, [] toxic, [] Neurological, [] Other    Abnormal Nurtitional Status: [] malnurtition (see nutrition note), [ ]underweight: BMI < 19, [] morbid obesity: BMI >40, [] Cachexia    [] Sepsis  [] hypovolemic shock,[] cardiogenic shock, [] hemorrhagic shock, [] neuogenic shock  [] Acute Respiratory Failure  []Cerebral edema, [] Brain compression/ herniation,   [] Functional quadriplegia  [] Acute blood loss anemia   MICU TRANSFER NOTE:  CC: Patient is a 45 y/o male who presented with a chief complaint of DKA with sepsis 2/2 suspected osteomyelitis of the right foot.    INTERVAL EVENTS: MISSY overnight.     HOSPITAL COURSE:   Patient is a 45 y/o male with PMHx of poorly controlled IDDM, transferred from [facility] on  to the MICU for DKA and sepsis 2/2 suspected osteomyelitis of the right foot. Initial glucose was 912, K 6.7, A1C 14.3. Patient was started on Insulin gtt, potassium repletion, PPI for GERD, Vancomycin/Zosyn for right foot infection. Anion gap normal twice as of  7:30AM. Endo consulted. Right foot XR revealed osteopenia in fifth ray, patient scheduled for OR with podiatry for partial right foot amputation .       Vital Signs Last 24 Hrs  T(C): 36.3 (01 Dec 2021 09:00), Max: 36.6 (2021 12:43)  T(F): 97.4 (01 Dec 2021 09:00), Max: 97.9 (2021 12:43)  HR: 91 (01 Dec 2021 12:00) (91 - 117)  BP: 139/82 (01 Dec 2021 12:00) (117/79 - 147/104)  BP(mean): 103 (01 Dec 2021 12:00) (91 - 112)  RR: 18 (01 Dec 2021 05:00) (10 - 24)  SpO2: 98% (01 Dec 2021 12:00) (95% - 100%)    I&O's Detail    2021 07:01  -  01 Dec 2021 07:00  --------------------------------------------------------  IN:    Insulin: 22.1 mL  Total IN: 22.1 mL    OUT:    Voided (mL): 1030 mL  Total OUT: 1030 mL    Total NET: -1007.9 mL      01 Dec 2021 07:01  -  01 Dec 2021 12:24  --------------------------------------------------------  IN:    dextrose 5% + sodium chloride 0.45% w/ Additives: 1500 mL    Insulin: 42.9 mL    IV PiggyBack: 100 mL  Total IN: 1642.9 mL    OUT:  Total OUT: 0 mL    Total NET: 1642.9 mL        I&O's Summary    2021 07:  -  01 Dec 2021 07:00  --------------------------------------------------------  IN: 22.1 mL / OUT: 1030 mL / NET: -1007.9 mL    01 Dec 2021 07:01  -  01 Dec 2021 12:24  --------------------------------------------------------  IN: 1642.9 mL / OUT: 0 mL / NET: 1642.9 mL        PHYSICAL EXAM:  Constitutional: 45 y/o male malnourished male, alert in no acute distress.  HEENT: head atraumatic, PERRL, EMOI, Oropharyngeal mucosa moist, pink, tongue midline, Neck supple, FROM, no LAD  Respiratory: Clear to auscultation bilaterally.  No rales, rhonchi, wheezes.  Cardiovascular: Regular rate and rhythm.  S1, S2 heard.  Gastrointestinal:  Soft, nontender, nondistended, decreased bowel sounds  Genitourinary/rectal:  Deferred.  Vascular: b/l LE cool to touch, 2+ b/l radial pulses, 0-1+ DP pulses b/l, LLE s/p amputation of all toes.   Neurological: AAOx3, 5/5 strength, decreased sensation in b/l LE   Skin: b/l feet wrapped in gauze. Otherwise warm, dry, intact, no erythema.      TUBES/LINES:  [] CVC  [] A-line  [] Lumbar Drain  [] Ventriculostomy  [] Mcginnis  [] Other    DIET:  [x] NPO  [] Mechanical  [] Tube feeds    LABS:                        12.0   14.64 )-----------( 570      ( 01 Dec 2021 03:26 )             35.2         136  |  100  |  37<H>  ----------------------------<  223<H>  4.2   |  21<L>  |  1.12    Ca    10.1      01 Dec 2021 07:34  Phos  2.2       Mg     2.1         TPro  9.1<H>  /  Alb  3.2<L>  /  TBili  0.2  /  DBili  x   /  AST  9<L>  /  ALT  8<L>  /  AlkPhos  191<H>      PT/INR - ( 01 Dec 2021 03:26 )   PT: 14.7 sec;   INR: 1.24          PTT - ( 01 Dec 2021 03:26 )  PTT:35.6 sec  Urinalysis Basic - ( 2021 15:59 )    Color: Yellow / Appearance: Clear / S.020 / pH: x  Gluc: x / Ketone: >=80 mg/dL  / Bili: Small / Urobili: 0.2 E.U./dL   Blood: x / Protein: NEGATIVE mg/dL / Nitrite: NEGATIVE   Leuk Esterase: NEGATIVE / RBC: < 5 /HPF / WBC < 5 /HPF   Sq Epi: x / Non Sq Epi: 0-5 /HPF / Bacteria: Present /HPF      CARDIAC MARKERS ( 2021 19:01 )  x     / 0.01 ng/mL / x     / x     / x          CAPILLARY BLOOD GLUCOSE      POCT Blood Glucose.: 220 mg/dL (01 Dec 2021 12:11)  POCT Blood Glucose.: 206 mg/dL (01 Dec 2021 11:06)  POCT Blood Glucose.: 204 mg/dL (01 Dec 2021 10:16)  POCT Blood Glucose.: 239 mg/dL (01 Dec 2021 09:06)  POCT Blood Glucose.: 209 mg/dL (01 Dec 2021 08:06)  POCT Blood Glucose.: 256 mg/dL (01 Dec 2021 07:06)  POCT Blood Glucose.: 188 mg/dL (01 Dec 2021 06:33)  POCT Blood Glucose.: 240 mg/dL (01 Dec 2021 05:09)  POCT Blood Glucose.: 245 mg/dL (01 Dec 2021 04:06)  POCT Blood Glucose.: 269 mg/dL (01 Dec 2021 03:14)  POCT Blood Glucose.: 269 mg/dL (01 Dec 2021 02:20)  POCT Blood Glucose.: 291 mg/dL (01 Dec 2021 01:21)  POCT Blood Glucose.: 289 mg/dL (01 Dec 2021 00:26)  POCT Blood Glucose.: 344 mg/dL (2021 23:18)  POCT Blood Glucose.: 316 mg/dL (2021 22:07)  POCT Blood Glucose.: 369 mg/dL (2021 21:06)  POCT Blood Glucose.: 375 mg/dL (2021 20:05)  POCT Blood Glucose.: 544 mg/dL (2021 19:02)  POCT Blood Glucose.: 472 mg/dL (2021 18:31)  POCT Blood Glucose.: 559 mg/dL (2021 16:30)  POCT Blood Glucose.: >600 mg/dL (2021 15:36)  POCT Blood Glucose.: >600 mg/dL (2021 15:34)  POCT Blood Glucose.: >600 mg/dL (2021 14:34)  POCT Blood Glucose.: >600 mg/dL (2021 12:55)  POCT Blood Glucose.: >600 mg/dL (2021 12:54)      Drug Levels: [x] N/A    CSF Analysis: [x] N/A      Allergies    No Known Allergies    Intolerances      MEDICATIONS:  Antibiotics:  piperacillin/tazobactam IVPB.. 3.375 Gram(s) IV Intermittent every 6 hours  vancomycin  IVPB 1000 milliGRAM(s) IV Intermittent every 12 hours    Neuro:  acetaminophen     Tablet .. 650 milliGRAM(s) Oral every 6 hours PRN    Anticoagulation:    OTHER:  atorvastatin 20 milliGRAM(s) Oral at bedtime  calcium carbonate    500 mG (Tums) Chewable 1 Tablet(s) Chew three times a day PRN  dextrose 50% Injectable 50 milliLiter(s) IV Push every 15 minutes  glucagon  Injectable 1 milliGRAM(s) IntraMuscular once  influenza   Vaccine 0.5 milliLiter(s) IntraMuscular once  insulin regular Infusion 7 Unit(s)/Hr IV Continuous <Continuous>  pantoprazole    Tablet 40 milliGRAM(s) Oral before breakfast  simethicone 80 milliGRAM(s) Chew two times a day PRN    IVF:  dextrose 5% + sodium chloride 0.45% 1000 milliLiter(s) IV Continuous <Continuous>  dextrose 5%. 1000 milliLiter(s) IV Continuous <Continuous>  sodium phosphate IVPB 15 milliMole(s) IV Intermittent once    CULTURES: N/A    RADIOLOGY & ADDITIONAL TESTS:    < from: Xray Foot AP + Lateral + Oblique, Right (11.30.21 @ 16:50) >  IMPRESSION:  There is relative osteopenia of the fifth ray, equivocal for sequelae of acute osteomyelitis.        ASSESSMENT:  45 y/o male with PMHx of poorly controlled IDDM and diabetic neuropathy BIBEMS for CP, found to have DKA, admitted to MICU for insulin gtt and severe sepsis 2/2 possible OM of R foot. Anion gap closed x2 as of  7:30AM. Patient is pre-op for partial R foot amputation by podiatry on .       PLAN:  Neuro  #AMS 2/2 DKA vs severe sepsis  Pt somnolent on exam, unclear whether this is pt's baseline. Reports his last meal was 1 week ago. Denies substance use.  - UA negative  - ammonia level WNL  - tx DKA as below    Pulm  #metabolic acidosis, compensated  ABG w/ pCO2 24, metabolic acidosis appropriately compensated according to Winter's formula  - currently satting well on RA  - keep O2 sat >94%    Cardiovascular  #Chest pain  Pt complained of chest pain, says this is why he called EMS. Pain was substernal, pressure-like. Non-reproducible and now resolved. No EKG on file. No h/o CAD or other cardiovascular history.  - troponin <0.01  - f/u EKG  - will give tums, simethicone and start PPI to r/o GERD  - continue to monitor    #HLD  According to med rec pt prescribed Atorvastatin 20mg qhs, though denies h/o HLD and did not report taking this medication.  - c/w atorvastatin 20mg qhs    GI  #decreased appetite  - PPI for GERD  - c/w DKA tx as below  - NPO for OR    Renal  #hyperkalemia  Hyperkalemic w/ K 6.8 on admission, no EKG on file. Treated only w/ 3L IVF.  - repeat K 4.2  - continue to monitor and replete along w/ DKA tx    ID  #severe sepsis w/o shock  Pt met 2/4 SIRS on presentation (Hr >90, wbcs >12) w/ lactate 2.3. Now s/p 3L NS. R foot possible source.  - s/p 3L NS, vanc/zosyn x1 in ED  - repeat lactate 1.6  - c/w vanc/zosyn    #cellulitis vs gangrene vs OM of R foot  Pt w/ known h/o DKA and h/o amputation of L toes 2020 2/2 diabetic neuropathy.  - XR R foot: relative osteopenia of the fifth ray, equivocal for sequelae of acute osteomyelitis.  - s/p vanc/zosyn x1 in ED @ 2pm  - c/w vanc/zosyn  - podiatry consulted, pre-op for partial R foot amputation    - pending CT right foot w/ contrast for pre-op  - f/u bcx, ucx    Endo  #DKA  Glucose 912 on admission w/ AG 31, K 6.8, bicarb 11, BHB 6.1. Pt diagnosed w/ DM last year 2020 when he was hospitalized for DKA, also had R toe amputation during that hospitalization.  - DKA protocol  - A1c 14.3%  - endocrine consulted, pending reccs    #DM  Diagnosed last year. Reportedly takes insulin at home but only intermittently compliant. Reportedly takes basaglar 40u qhs, novolog 1u TID, but prescribed 50u qhs, 15u TID. Meds last picked up 2021. Med rec done (in handoff)  - tx DKA as above    Heme  no active issues    Prophylaxis  F: s/p 3L NS in ED  E: replete PRN  N: NPO w/ meds for OR  GI ppx: PPI  DVT ppx: held for OR  Code status: FULL CODE  Dispo: [facility]     d/w MICU intensivist Dr. Velasco   -------------------MICU TRANSFER NOTE:--------------------    CC: Patient is a 45 y/o male who presented with a chief complaint of DKA with sepsis 2/2 suspected osteomyelitis of the right foot.    INTERVAL EVENTS: MISSY overnight.     HOSPITAL COURSE:   Patient is a 45 y/o male with PMHx of poorly controlled IDDM, transferred from [facility] on  to the MICU for DKA and sepsis 2/2 suspected osteomyelitis of the right foot. Initial glucose was 912, K 6.7, A1C 14.3. Patient was started on Insulin gtt, potassium repletion, PPI for GERD, Vancomycin/Zosyn for right foot infection. Anion gap normal twice as of  7:30AM. Endo consulted. Right foot XR revealed osteopenia in fifth ray, patient scheduled for OR with podiatry for partial right foot amputation .       Vital Signs Last 24 Hrs  T(C): 36.3 (01 Dec 2021 09:00), Max: 36.6 (2021 12:43)  T(F): 97.4 (01 Dec 2021 09:00), Max: 97.9 (2021 12:43)  HR: 91 (01 Dec 2021 12:00) (91 - 117)  BP: 139/82 (01 Dec 2021 12:00) (117/79 - 147/104)  BP(mean): 103 (01 Dec 2021 12:00) (91 - 112)  RR: 18 (01 Dec 2021 05:00) (10 - 24)  SpO2: 98% (01 Dec 2021 12:00) (95% - 100%)    I&O's Detail    2021 07:01  -  01 Dec 2021 07:00  --------------------------------------------------------  IN:    Insulin: 22.1 mL  Total IN: 22.1 mL    OUT:    Voided (mL): 1030 mL  Total OUT: 1030 mL    Total NET: -1007.9 mL      01 Dec 2021 07:01  -  01 Dec 2021 12:24  --------------------------------------------------------  IN:    dextrose 5% + sodium chloride 0.45% w/ Additives: 1500 mL    Insulin: 42.9 mL    IV PiggyBack: 100 mL  Total IN: 1642.9 mL    OUT:  Total OUT: 0 mL    Total NET: 1642.9 mL        I&O's Summary    2021 07:  -  01 Dec 2021 07:00  --------------------------------------------------------  IN: 22.1 mL / OUT: 1030 mL / NET: -1007.9 mL    01 Dec 2021 07:  -  01 Dec 2021 12:24  --------------------------------------------------------  IN: 1642.9 mL / OUT: 0 mL / NET: 1642.9 mL        PHYSICAL EXAM:  Constitutional: 45 y/o male malnourished male, alert in no acute distress.  HEENT: head atraumatic, PERRL, EMOI, Oropharyngeal mucosa moist, pink, tongue midline, Neck supple, FROM, no LAD  Respiratory: Clear to auscultation bilaterally.  No rales, rhonchi, wheezes.  Cardiovascular: Regular rate and rhythm.  S1, S2 heard.  Gastrointestinal:  Soft, nontender, nondistended, decreased bowel sounds  Genitourinary/rectal:  Deferred.  Vascular: b/l LE cool to touch, 2+ b/l radial pulses, 0-1+ DP pulses b/l, LLE s/p amputation of all toes.   Neurological: AAOx3, 5/5 strength, decreased sensation in b/l LE   Skin: b/l feet wrapped in gauze. Otherwise warm, dry, intact, no erythema.      TUBES/LINES:  [] CVC  [] A-line  [] Lumbar Drain  [] Ventriculostomy  [] Mcginnis  [] Other    DIET:  [x] NPO  [] Mechanical  [] Tube feeds    LABS:                        12.0   14.64 )-----------( 570      ( 01 Dec 2021 03:26 )             35.2     12-    136  |  100  |  37<H>  ----------------------------<  223<H>  4.2   |  21<L>  |  1.12    Ca    10.1      01 Dec 2021 07:34  Phos  2.2       Mg     2.1         TPro  9.1<H>  /  Alb  3.2<L>  /  TBili  0.2  /  DBili  x   /  AST  9<L>  /  ALT  8<L>  /  AlkPhos  191<H>  12    PT/INR - ( 01 Dec 2021 03:26 )   PT: 14.7 sec;   INR: 1.24          PTT - ( 01 Dec 2021 03:26 )  PTT:35.6 sec  Urinalysis Basic - ( 2021 15:59 )    Color: Yellow / Appearance: Clear / S.020 / pH: x  Gluc: x / Ketone: >=80 mg/dL  / Bili: Small / Urobili: 0.2 E.U./dL   Blood: x / Protein: NEGATIVE mg/dL / Nitrite: NEGATIVE   Leuk Esterase: NEGATIVE / RBC: < 5 /HPF / WBC < 5 /HPF   Sq Epi: x / Non Sq Epi: 0-5 /HPF / Bacteria: Present /HPF      CARDIAC MARKERS ( 2021 19:01 )  x     / 0.01 ng/mL / x     / x     / x          CAPILLARY BLOOD GLUCOSE      POCT Blood Glucose.: 220 mg/dL (01 Dec 2021 12:11)  POCT Blood Glucose.: 206 mg/dL (01 Dec 2021 11:06)  POCT Blood Glucose.: 204 mg/dL (01 Dec 2021 10:16)  POCT Blood Glucose.: 239 mg/dL (01 Dec 2021 09:06)  POCT Blood Glucose.: 209 mg/dL (01 Dec 2021 08:06)  POCT Blood Glucose.: 256 mg/dL (01 Dec 2021 07:06)  POCT Blood Glucose.: 188 mg/dL (01 Dec 2021 06:33)  POCT Blood Glucose.: 240 mg/dL (01 Dec 2021 05:09)  POCT Blood Glucose.: 245 mg/dL (01 Dec 2021 04:06)  POCT Blood Glucose.: 269 mg/dL (01 Dec 2021 03:14)  POCT Blood Glucose.: 269 mg/dL (01 Dec 2021 02:20)  POCT Blood Glucose.: 291 mg/dL (01 Dec 2021 01:21)  POCT Blood Glucose.: 289 mg/dL (01 Dec 2021 00:26)  POCT Blood Glucose.: 344 mg/dL (2021 23:18)  POCT Blood Glucose.: 316 mg/dL (2021 22:07)  POCT Blood Glucose.: 369 mg/dL (2021 21:06)  POCT Blood Glucose.: 375 mg/dL (2021 20:05)  POCT Blood Glucose.: 544 mg/dL (2021 19:02)  POCT Blood Glucose.: 472 mg/dL (2021 18:31)  POCT Blood Glucose.: 559 mg/dL (2021 16:30)  POCT Blood Glucose.: >600 mg/dL (2021 15:36)  POCT Blood Glucose.: >600 mg/dL (2021 15:34)  POCT Blood Glucose.: >600 mg/dL (2021 14:34)  POCT Blood Glucose.: >600 mg/dL (2021 12:55)  POCT Blood Glucose.: >600 mg/dL (2021 12:54)      Drug Levels: [x] N/A    CSF Analysis: [x] N/A      Allergies    No Known Allergies    Intolerances      MEDICATIONS:  Antibiotics:  piperacillin/tazobactam IVPB.. 3.375 Gram(s) IV Intermittent every 6 hours  vancomycin  IVPB 1000 milliGRAM(s) IV Intermittent every 12 hours    Neuro:  acetaminophen     Tablet .. 650 milliGRAM(s) Oral every 6 hours PRN    Anticoagulation:    OTHER:  atorvastatin 20 milliGRAM(s) Oral at bedtime  calcium carbonate    500 mG (Tums) Chewable 1 Tablet(s) Chew three times a day PRN  dextrose 50% Injectable 50 milliLiter(s) IV Push every 15 minutes  glucagon  Injectable 1 milliGRAM(s) IntraMuscular once  influenza   Vaccine 0.5 milliLiter(s) IntraMuscular once  insulin regular Infusion 7 Unit(s)/Hr IV Continuous <Continuous>  pantoprazole    Tablet 40 milliGRAM(s) Oral before breakfast  simethicone 80 milliGRAM(s) Chew two times a day PRN    IVF:  dextrose 5% + sodium chloride 0.45% 1000 milliLiter(s) IV Continuous <Continuous>  dextrose 5%. 1000 milliLiter(s) IV Continuous <Continuous>  sodium phosphate IVPB 15 milliMole(s) IV Intermittent once    CULTURES: N/A    RADIOLOGY & ADDITIONAL TESTS:    < from: Xray Foot AP + Lateral + Oblique, Right (11.30.21 @ 16:50) >  IMPRESSION:  There is relative osteopenia of the fifth ray, equivocal for sequelae of acute osteomyelitis.        ASSESSMENT:  45 y/o male with PMHx of poorly controlled IDDM and diabetic neuropathy BIBEMS for CP, found to have DKA, admitted to MICU for insulin gtt and severe sepsis 2/2 possible OM of R foot. Anion gap closed x2 as of  7:30AM. Patient is pre-op for partial R foot amputation by podiatry on .       PLAN:  Neuro  #AMS 2/2 DKA vs severe sepsis  Pt somnolent on exam, unclear whether this is pt's baseline. Reports his last meal was 1 week ago. Denies substance use.  - UA negative  - ammonia level WNL  - tx DKA as below    Pulm  #metabolic acidosis, compensated  ABG w/ pCO2 24, metabolic acidosis appropriately compensated according to Winter's formula  - currently satting well on RA  - keep O2 sat >94%    Cardiovascular  #Chest pain  Pt complained of chest pain, says this is why he called EMS. Pain was substernal, pressure-like. Non-reproducible and now resolved. No EKG on file. No h/o CAD or other cardiovascular history.  - troponin <0.01  - f/u EKG  - will give tums, simethicone and start PPI to r/o GERD  - continue to monitor    #HLD  According to med rec pt prescribed Atorvastatin 20mg qhs, though denies h/o HLD and did not report taking this medication.  - c/w atorvastatin 20mg qhs    GI  #decreased appetite  - PPI for GERD  - c/w DKA tx as below  - NPO for OR    Renal  #hyperkalemia  Hyperkalemic w/ K 6.8 on admission, no EKG on file. Treated only w/ 3L IVF.  - repeat K 4.2  - continue to monitor and replete along w/ DKA tx    ID  #severe sepsis w/o shock  Pt met 2/4 SIRS on presentation (Hr >90, wbcs >12) w/ lactate 2.3. Now s/p 3L NS. R foot possible source.  - s/p 3L NS, vanc/zosyn x1 in ED  - repeat lactate 1.6  - c/w vanc/zosyn    #cellulitis vs gangrene vs OM of R foot  Pt w/ known h/o DKA and h/o amputation of L toes 2020 2/2 diabetic neuropathy.  - XR R foot: relative osteopenia of the fifth ray, equivocal for sequelae of acute osteomyelitis.  - s/p vanc/zosyn x1 in ED @ 2pm  - c/w vanc/zosyn  - podiatry consulted, pre-op for partial R foot amputation    - pending CT right foot w/ contrast for pre-op  - f/u bcx, ucx    Endo  #DKA  Glucose 912 on admission w/ AG 31, K 6.8, bicarb 11, BHB 6.1. Pt diagnosed w/ DM last year 2020 when he was hospitalized for DKA, also had R toe amputation during that hospitalization.  - DKA protocol  - A1c 14.3%  - endocrine consulted, pending reccs    #DM  Diagnosed last year. Reportedly takes insulin at home but only intermittently compliant. Reportedly takes basaglar 40u qhs, novolog 1u TID, but prescribed 50u qhs, 15u TID. Meds last picked up 2021. Med rec done (in handoff)  - tx DKA as above    Heme  no active issues    Prophylaxis  F: s/p 3L NS in ED  E: replete PRN  N: NPO w/ meds for OR  GI ppx: PPI  DVT ppx: held for OR  Code status: FULL CODE  Dispo: [facility]     d/w MICU intensivist Dr. Velasco   -------------------MICU TRANSFER NOTE:--------------------    CC: Patient is a 47 y/o male who presented with a chief complaint of DKA with sepsis 2/2 suspected osteomyelitis of the right foot.    INTERVAL EVENTS: MISSY overnight.     HOSPITAL COURSE:   Patient is a 47 y/o male with PMHx of poorly controlled IDDM, transferred from [facility] on  to the MICU for DKA and sepsis 2/2 suspected osteomyelitis of the right foot. Initial glucose was 912, K 6.7, A1C 14.3. Patient was started on Insulin gtt, potassium repletion, PPI for GERD, Vancomycin/Zosyn for right foot infection. Anion gap normal twice as of  7:30AM, off Insulin gtt. Endo consulted. Right foot XR revealed osteopenia in fifth ray, patient scheduled for OR with podiatry for partial right foot amputation .       Vital Signs Last 24 Hrs  T(C): 36.3 (01 Dec 2021 09:00), Max: 36.6 (2021 12:43)  T(F): 97.4 (01 Dec 2021 09:00), Max: 97.9 (2021 12:43)  HR: 91 (01 Dec 2021 12:00) (91 - 117)  BP: 139/82 (01 Dec 2021 12:00) (117/79 - 147/104)  BP(mean): 103 (01 Dec 2021 12:00) (91 - 112)  RR: 18 (01 Dec 2021 05:00) (10 - 24)  SpO2: 98% (01 Dec 2021 12:00) (95% - 100%)    I&O's Detail    2021 07:01  -  01 Dec 2021 07:00  --------------------------------------------------------  IN:    Insulin: 22.1 mL  Total IN: 22.1 mL    OUT:    Voided (mL): 1030 mL  Total OUT: 1030 mL    Total NET: -1007.9 mL      01 Dec 2021 07:  -  01 Dec 2021 12:24  --------------------------------------------------------  IN:    dextrose 5% + sodium chloride 0.45% w/ Additives: 1500 mL    Insulin: 42.9 mL    IV PiggyBack: 100 mL  Total IN: 1642.9 mL    OUT:  Total OUT: 0 mL    Total NET: 1642.9 mL        I&O's Summary    2021 07:  -  01 Dec 2021 07:00  --------------------------------------------------------  IN: 22.1 mL / OUT: 1030 mL / NET: -1007.9 mL    01 Dec 2021 07:  -  01 Dec 2021 12:24  --------------------------------------------------------  IN: 1642.9 mL / OUT: 0 mL / NET: 1642.9 mL        PHYSICAL EXAM:  Constitutional: 47 y/o male malnourished male, alert in no acute distress.  HEENT: head atraumatic, PERRL, EMOI, Oropharyngeal mucosa moist, pink, tongue midline, Neck supple, FROM, no LAD  Respiratory: Clear to auscultation bilaterally.  No rales, rhonchi, wheezes.  Cardiovascular: Regular rate and rhythm.  S1, S2 heard.  Gastrointestinal:  Soft, nontender, nondistended, decreased bowel sounds  Genitourinary/rectal:  Deferred.  Vascular: b/l LE cool to touch, 2+ b/l radial pulses, 0-1+ DP pulses b/l, LLE s/p amputation of all toes.   Neurological: AAOx3, 5/5 strength, decreased sensation in b/l LE   Skin: b/l feet wrapped in gauze. Otherwise warm, dry, intact, no erythema.      TUBES/LINES:  [] CVC  [] A-line  [] Lumbar Drain  [] Ventriculostomy  [] Mcginnis  [] Other    DIET:  [x] NPO  [] Mechanical  [] Tube feeds    LABS:                        12.0   14.64 )-----------( 570      ( 01 Dec 2021 03:26 )             35.2     12-    136  |  100  |  37<H>  ----------------------------<  223<H>  4.2   |  21<L>  |  1.12    Ca    10.1      01 Dec 2021 07:34  Phos  2.2     12  Mg     2.1     12    TPro  9.1<H>  /  Alb  3.2<L>  /  TBili  0.2  /  DBili  x   /  AST  9<L>  /  ALT  8<L>  /  AlkPhos  191<H>  12    PT/INR - ( 01 Dec 2021 03:26 )   PT: 14.7 sec;   INR: 1.24          PTT - ( 01 Dec 2021 03:26 )  PTT:35.6 sec  Urinalysis Basic - ( 2021 15:59 )    Color: Yellow / Appearance: Clear / S.020 / pH: x  Gluc: x / Ketone: >=80 mg/dL  / Bili: Small / Urobili: 0.2 E.U./dL   Blood: x / Protein: NEGATIVE mg/dL / Nitrite: NEGATIVE   Leuk Esterase: NEGATIVE / RBC: < 5 /HPF / WBC < 5 /HPF   Sq Epi: x / Non Sq Epi: 0-5 /HPF / Bacteria: Present /HPF      CARDIAC MARKERS ( 2021 19:01 )  x     / 0.01 ng/mL / x     / x     / x          CAPILLARY BLOOD GLUCOSE      POCT Blood Glucose.: 220 mg/dL (01 Dec 2021 12:11)  POCT Blood Glucose.: 206 mg/dL (01 Dec 2021 11:06)  POCT Blood Glucose.: 204 mg/dL (01 Dec 2021 10:16)  POCT Blood Glucose.: 239 mg/dL (01 Dec 2021 09:06)  POCT Blood Glucose.: 209 mg/dL (01 Dec 2021 08:06)  POCT Blood Glucose.: 256 mg/dL (01 Dec 2021 07:06)  POCT Blood Glucose.: 188 mg/dL (01 Dec 2021 06:33)  POCT Blood Glucose.: 240 mg/dL (01 Dec 2021 05:09)  POCT Blood Glucose.: 245 mg/dL (01 Dec 2021 04:06)  POCT Blood Glucose.: 269 mg/dL (01 Dec 2021 03:14)  POCT Blood Glucose.: 269 mg/dL (01 Dec 2021 02:20)  POCT Blood Glucose.: 291 mg/dL (01 Dec 2021 01:21)  POCT Blood Glucose.: 289 mg/dL (01 Dec 2021 00:26)  POCT Blood Glucose.: 344 mg/dL (2021 23:18)  POCT Blood Glucose.: 316 mg/dL (2021 22:07)  POCT Blood Glucose.: 369 mg/dL (2021 21:06)  POCT Blood Glucose.: 375 mg/dL (2021 20:05)  POCT Blood Glucose.: 544 mg/dL (2021 19:02)  POCT Blood Glucose.: 472 mg/dL (2021 18:31)  POCT Blood Glucose.: 559 mg/dL (2021 16:30)  POCT Blood Glucose.: >600 mg/dL (2021 15:36)  POCT Blood Glucose.: >600 mg/dL (2021 15:34)  POCT Blood Glucose.: >600 mg/dL (2021 14:34)  POCT Blood Glucose.: >600 mg/dL (2021 12:55)  POCT Blood Glucose.: >600 mg/dL (2021 12:54)      Drug Levels: [x] N/A    CSF Analysis: [x] N/A      Allergies    No Known Allergies    Intolerances      MEDICATIONS:  Antibiotics:  piperacillin/tazobactam IVPB.. 3.375 Gram(s) IV Intermittent every 6 hours  vancomycin  IVPB 1000 milliGRAM(s) IV Intermittent every 12 hours    Neuro:  acetaminophen     Tablet .. 650 milliGRAM(s) Oral every 6 hours PRN    Anticoagulation:    OTHER:  atorvastatin 20 milliGRAM(s) Oral at bedtime  calcium carbonate    500 mG (Tums) Chewable 1 Tablet(s) Chew three times a day PRN  dextrose 50% Injectable 50 milliLiter(s) IV Push every 15 minutes  glucagon  Injectable 1 milliGRAM(s) IntraMuscular once  influenza   Vaccine 0.5 milliLiter(s) IntraMuscular once  insulin regular Infusion 7 Unit(s)/Hr IV Continuous <Continuous>  pantoprazole    Tablet 40 milliGRAM(s) Oral before breakfast  simethicone 80 milliGRAM(s) Chew two times a day PRN    IVF:  dextrose 5% + sodium chloride 0.45% 1000 milliLiter(s) IV Continuous <Continuous>  dextrose 5%. 1000 milliLiter(s) IV Continuous <Continuous>  sodium phosphate IVPB 15 milliMole(s) IV Intermittent once    CULTURES: N/A    RADIOLOGY & ADDITIONAL TESTS:    < from: Xray Foot AP + Lateral + Oblique, Right (11.30.21 @ 16:50) >  IMPRESSION:  There is relative osteopenia of the fifth ray, equivocal for sequelae of acute osteomyelitis.        ASSESSMENT:  47 y/o male with PMHx of poorly controlled IDDM and diabetic neuropathy BIBEMS for CP, found to have DKA, admitted to MICU for insulin gtt and severe sepsis 2/2 possible OM of R foot. Anion gap closed x2 as of  7:30AM, off Insulin gtt. Patient is pre-op for partial R foot amputation by podiatry on .       PLAN:  Neuro  #AMS 2/2 DKA vs severe sepsis  Pt somnolent on exam, unclear whether this is pt's baseline. Reports his last meal was 1 week ago. Denies substance use.  - UA negative  - ammonia level WNL  - tx DKA as below    Pulm  #metabolic acidosis, compensated  ABG w/ pCO2 24, metabolic acidosis appropriately compensated according to Winter's formula  - currently satting well on RA  - keep O2 sat >94%    Cardiovascular  #Chest pain  Pt complained of chest pain, says this is why he called EMS. Pain was substernal, pressure-like. Non-reproducible and now resolved. No EKG on file. No h/o CAD or other cardiovascular history.  - troponin <0.01  - f/u EKG  - will give tums, simethicone and start PPI to r/o GERD  - continue to monitor    #HLD  According to med rec pt prescribed Atorvastatin 20mg qhs, though denies h/o HLD and did not report taking this medication.  - c/w atorvastatin 20mg qhs    GI  #decreased appetite  - PPI for GERD  - c/w DKA tx as below  - NPO for OR    Renal  #hyperkalemia  Hyperkalemic w/ K 6.8 on admission, no EKG on file. Treated only w/ 3L IVF.  - repeat K 4.2  - continue to monitor and replete along w/ DKA tx    ID  #severe sepsis w/o shock  Pt met 2/4 SIRS on presentation (Hr >90, wbcs >12) w/ lactate 2.3. Now s/p 3L NS. R foot possible source.  - s/p 3L NS, vanc/zosyn x1 in ED  - repeat lactate 1.6  - c/w vanc/zosyn    #cellulitis vs gangrene vs OM of R foot  Pt w/ known h/o DKA and h/o amputation of L toes 2020 2/2 diabetic neuropathy.  - XR R foot: relative osteopenia of the fifth ray, equivocal for sequelae of acute osteomyelitis.  - s/p vanc/zosyn x1 in ED @ 2pm  - c/w vanc/zosyn  - podiatry consulted, pre-op for partial R foot amputation    - pending CT right foot w/ contrast for pre-op  - f/u bcx, ucx    Endo  #DKA  Glucose 912 on admission w/ AG 31, K 6.8, bicarb 11, BHB 6.1. Pt diagnosed w/ DM last year 2020 when he was hospitalized for DKA, also had R toe amputation during that hospitalization.  - DKA protocol, off insulin gtt  - A1c 14.3%  - endocrine consulted, pending reccs    #DM  Diagnosed last year. Reportedly takes insulin at home but only intermittently compliant. Reportedly takes basaglar 40u qhs, novolog 1u TID, but prescribed 50u qhs, 15u TID. Meds last picked up 2021. Med rec done (in handoff)  - tx DKA as above    Heme  no active issues    Prophylaxis  F: s/p 3L NS in ED  E: replete PRN  N: NPO w/ meds for OR  GI ppx: PPI  DVT ppx: held for OR  Code status: FULL CODE  Dispo: [facility]     d/w MICU intensivist Dr. Velasco   -------------------MICU TRANSFER NOTE:--------------------    CC: Patient is a 45 y/o male who presented with a chief complaint of DKA with sepsis 2/2 suspected osteomyelitis of the right foot.    INTERVAL EVENTS: MISSY overnight.     HOSPITAL COURSE:   Patient is a 45 y/o male with PMHx of poorly controlled IDDM, transferred from [facility] on  to the MICU for DKA and sepsis 2/2 suspected osteomyelitis of the right foot. Initial glucose was 912, K 6.7, A1C 14.3. Patient was started on Insulin gtt, potassium repletion, PPI for GERD, Vancomycin/Zosyn for right foot infection. Anion gap normal twice as of  7:30AM, off Insulin gtt. Endo consulted. Right foot XR revealed osteopenia in fifth ray, patient scheduled for OR with podiatry for partial right foot amputation .       Vital Signs Last 24 Hrs  T(C): 36.3 (01 Dec 2021 09:00), Max: 36.6 (2021 12:43)  T(F): 97.4 (01 Dec 2021 09:00), Max: 97.9 (2021 12:43)  HR: 91 (01 Dec 2021 12:00) (91 - 117)  BP: 139/82 (01 Dec 2021 12:00) (117/79 - 147/104)  BP(mean): 103 (01 Dec 2021 12:00) (91 - 112)  RR: 18 (01 Dec 2021 05:00) (10 - 24)  SpO2: 98% (01 Dec 2021 12:00) (95% - 100%)    I&O's Detail    2021 07:01  -  01 Dec 2021 07:00  --------------------------------------------------------  IN:    Insulin: 22.1 mL  Total IN: 22.1 mL    OUT:    Voided (mL): 1030 mL  Total OUT: 1030 mL    Total NET: -1007.9 mL      01 Dec 2021 07:  -  01 Dec 2021 12:24  --------------------------------------------------------  IN:    dextrose 5% + sodium chloride 0.45% w/ Additives: 1500 mL    Insulin: 42.9 mL    IV PiggyBack: 100 mL  Total IN: 1642.9 mL    OUT:  Total OUT: 0 mL    Total NET: 1642.9 mL        I&O's Summary    2021 07:  -  01 Dec 2021 07:00  --------------------------------------------------------  IN: 22.1 mL / OUT: 1030 mL / NET: -1007.9 mL    01 Dec 2021 07:  -  01 Dec 2021 12:24  --------------------------------------------------------  IN: 1642.9 mL / OUT: 0 mL / NET: 1642.9 mL        PHYSICAL EXAM:  Constitutional: 45 y/o male malnourished male, alert in no acute distress.  HEENT: head atraumatic, PERRL, EMOI, Oropharyngeal mucosa moist, pink, tongue midline, Neck supple, FROM, no LAD  Respiratory: Clear to auscultation bilaterally.  No rales, rhonchi, wheezes.  Cardiovascular: Regular rate and rhythm.  S1, S2 heard.  Gastrointestinal:  Soft, nontender, nondistended, decreased bowel sounds  Genitourinary/rectal:  Deferred.  Vascular: b/l LE cool to touch, 2+ b/l radial pulses, 0-1+ DP pulses b/l, LLE s/p amputation of all toes.   Neurological: AAOx3, 5/5 strength, decreased sensation in b/l LE   Skin: b/l feet wrapped in gauze. Otherwise warm, dry, intact, no erythema.      TUBES/LINES:  [] CVC  [] A-line  [] Lumbar Drain  [] Ventriculostomy  [] Mcginnis  [] Other    DIET:  [x] NPO  [] Mechanical  [] Tube feeds    LABS:                        12.0   14.64 )-----------( 570      ( 01 Dec 2021 03:26 )             35.2     12-    136  |  100  |  37<H>  ----------------------------<  223<H>  4.2   |  21<L>  |  1.12    Ca    10.1      01 Dec 2021 07:34  Phos  2.2     12  Mg     2.1     12    TPro  9.1<H>  /  Alb  3.2<L>  /  TBili  0.2  /  DBili  x   /  AST  9<L>  /  ALT  8<L>  /  AlkPhos  191<H>  12    PT/INR - ( 01 Dec 2021 03:26 )   PT: 14.7 sec;   INR: 1.24          PTT - ( 01 Dec 2021 03:26 )  PTT:35.6 sec  Urinalysis Basic - ( 2021 15:59 )    Color: Yellow / Appearance: Clear / S.020 / pH: x  Gluc: x / Ketone: >=80 mg/dL  / Bili: Small / Urobili: 0.2 E.U./dL   Blood: x / Protein: NEGATIVE mg/dL / Nitrite: NEGATIVE   Leuk Esterase: NEGATIVE / RBC: < 5 /HPF / WBC < 5 /HPF   Sq Epi: x / Non Sq Epi: 0-5 /HPF / Bacteria: Present /HPF      CARDIAC MARKERS ( 2021 19:01 )  x     / 0.01 ng/mL / x     / x     / x          CAPILLARY BLOOD GLUCOSE      POCT Blood Glucose.: 220 mg/dL (01 Dec 2021 12:11)  POCT Blood Glucose.: 206 mg/dL (01 Dec 2021 11:06)  POCT Blood Glucose.: 204 mg/dL (01 Dec 2021 10:16)  POCT Blood Glucose.: 239 mg/dL (01 Dec 2021 09:06)  POCT Blood Glucose.: 209 mg/dL (01 Dec 2021 08:06)  POCT Blood Glucose.: 256 mg/dL (01 Dec 2021 07:06)  POCT Blood Glucose.: 188 mg/dL (01 Dec 2021 06:33)  POCT Blood Glucose.: 240 mg/dL (01 Dec 2021 05:09)  POCT Blood Glucose.: 245 mg/dL (01 Dec 2021 04:06)  POCT Blood Glucose.: 269 mg/dL (01 Dec 2021 03:14)  POCT Blood Glucose.: 269 mg/dL (01 Dec 2021 02:20)  POCT Blood Glucose.: 291 mg/dL (01 Dec 2021 01:21)  POCT Blood Glucose.: 289 mg/dL (01 Dec 2021 00:26)  POCT Blood Glucose.: 344 mg/dL (2021 23:18)  POCT Blood Glucose.: 316 mg/dL (2021 22:07)  POCT Blood Glucose.: 369 mg/dL (2021 21:06)  POCT Blood Glucose.: 375 mg/dL (2021 20:05)  POCT Blood Glucose.: 544 mg/dL (2021 19:02)  POCT Blood Glucose.: 472 mg/dL (2021 18:31)  POCT Blood Glucose.: 559 mg/dL (2021 16:30)  POCT Blood Glucose.: >600 mg/dL (2021 15:36)  POCT Blood Glucose.: >600 mg/dL (2021 15:34)  POCT Blood Glucose.: >600 mg/dL (2021 14:34)  POCT Blood Glucose.: >600 mg/dL (2021 12:55)  POCT Blood Glucose.: >600 mg/dL (2021 12:54)      Drug Levels: [x] N/A    CSF Analysis: [x] N/A      Allergies    No Known Allergies    Intolerances      MEDICATIONS:  Antibiotics:  piperacillin/tazobactam IVPB.. 3.375 Gram(s) IV Intermittent every 6 hours  vancomycin  IVPB 1000 milliGRAM(s) IV Intermittent every 12 hours    Neuro:  acetaminophen     Tablet .. 650 milliGRAM(s) Oral every 6 hours PRN    Anticoagulation:    OTHER:  atorvastatin 20 milliGRAM(s) Oral at bedtime  calcium carbonate    500 mG (Tums) Chewable 1 Tablet(s) Chew three times a day PRN  dextrose 50% Injectable 50 milliLiter(s) IV Push every 15 minutes  glucagon  Injectable 1 milliGRAM(s) IntraMuscular once  influenza   Vaccine 0.5 milliLiter(s) IntraMuscular once  insulin regular Infusion 7 Unit(s)/Hr IV Continuous <Continuous>  pantoprazole    Tablet 40 milliGRAM(s) Oral before breakfast  simethicone 80 milliGRAM(s) Chew two times a day PRN    IVF:  dextrose 5% + sodium chloride 0.45% 1000 milliLiter(s) IV Continuous <Continuous>  dextrose 5%. 1000 milliLiter(s) IV Continuous <Continuous>  sodium phosphate IVPB 15 milliMole(s) IV Intermittent once    CULTURES: N/A    RADIOLOGY & ADDITIONAL TESTS:    < from: Xray Foot AP + Lateral + Oblique, Right (11.30.21 @ 16:50) >  IMPRESSION:  There is relative osteopenia of the fifth ray, equivocal for sequelae of acute osteomyelitis.        ASSESSMENT:  45 y/o male with PMHx of poorly controlled IDDM and diabetic neuropathy BIBEMS for CP, found to have DKA, admitted to MICU for insulin gtt and severe sepsis 2/2 possible OM of R foot. Anion gap closed x2 as of  7:30AM, off Insulin gtt. Patient is pre-op for partial R foot amputation by podiatry on .       PLAN:  Neuro  #AMS 2/2 DKA vs severe sepsis  Pt somnolent on exam, unclear whether this is pt's baseline. Reports his last meal was 1 week ago. Denies substance use.  - UA negative  - ammonia level WNL  - tx DKA as below    Pulm  #metabolic acidosis, compensated  ABG w/ pCO2 24, metabolic acidosis appropriately compensated according to Winter's formula  - currently satting well on RA  - keep O2 sat >94%    Cardiovascular  #Chest pain  Pt complained of chest pain, says this is why he called EMS. Pain was substernal, pressure-like. Non-reproducible and now resolved. No EKG on file. No h/o CAD or other cardiovascular history.  - troponin <0.01  - f/u EKG  - will give tums, simethicone and start PPI to r/o GERD  - continue to monitor    #HLD  According to med rec pt prescribed Atorvastatin 20mg qhs, though denies h/o HLD and did not report taking this medication.  - c/w atorvastatin 20mg qhs    GI  #decreased appetite  - PPI for GERD  - c/w DKA tx as below  - NPO for OR    Renal  #hyperkalemia  Hyperkalemic w/ K 6.8 on admission, no EKG on file. Treated only w/ 3L IVF.  - repeat K 4.2  - continue to monitor and replete along w/ DKA tx    ID  #severe sepsis w/o shock  Pt met 2/4 SIRS on presentation (Hr >90, wbcs >12) w/ lactate 2.3. Now s/p 3L NS. R foot possible source.  - s/p 3L NS, vanc/zosyn x1 in ED  - repeat lactate 1.6  - c/w vanc/zosyn, pending vanc trough    #cellulitis vs gangrene vs OM of R foot  Pt w/ known h/o DKA and h/o amputation of L toes 2020 2/2 diabetic neuropathy.  - XR R foot: relative osteopenia of the fifth ray, equivocal for sequelae of acute osteomyelitis.  - s/p vanc/zosyn x1 in ED @ 2pm  - c/w vanc/zosyn  - podiatry consulted, pre-op for partial R foot amputation    - pending CT right foot w/ contrast for pre-op  - f/u bcx, ucx    Endo  #DKA  Glucose 912 on admission w/ AG 31, K 6.8, bicarb 11, BHB 6.1. Pt diagnosed w/ DM last year 2020 when he was hospitalized for DKA, also had R toe amputation during that hospitalization.  - DKA protocol, off insulin gtt  - A1c 14.3%  - endocrine consulted, pending reccs    #DM  Diagnosed last year. Reportedly takes insulin at home but only intermittently compliant. Reportedly takes basaglar 40u qhs, novolog 1u TID, but prescribed 50u qhs, 15u TID. Meds last picked up 2021. Med rec done (in handoff)  - tx DKA as above    Heme  no active issues    Prophylaxis  F: s/p 3L NS in ED  E: replete PRN  N: NPO w/ meds for OR  GI ppx: PPI  DVT ppx: held for OR  Code status: FULL CODE  Dispo: [facility]     d/w MICU intensivist Dr. Velasco

## 2021-12-01 NOTE — PROGRESS NOTE ADULT - SUBJECTIVE AND OBJECTIVE BOX
POST OP NOTE    ANAND MONTENEGRO  MRN-[id number]    Procedure: Partial 5th ray amputation  Surgeon: Dr. Ramirez  Assistants: Leon Bates, PGY1    Patient tolerated procedure well without incident.  Patient transferred to PACU in stable condition. Pt was sitting up comfortably in bed and was in NAD. Pt denies N/V/F/SOB/CP at this time. He noted he had some mild pain at the surgical site, but it was manageable w/o pain medication. Noted he was hungry and wanted to be back in his room. No other complaints.    PE / Post Op Check:       GEN: NAD, AAOx3, resting comfortably with pain controlled      LE Focused: CFT shows adequate perfusion b/l with no signs of ischemic compromise.  No strikethrough is appreciated on surgical dressings.  Dressings were dry, clean, and intact.  No neuromuscular deficits appreciated.

## 2021-12-01 NOTE — PROGRESS NOTE ADULT - ATTENDING COMMENTS
Patient was seen and examined with the resident team today.  I agree with Dr. Prakash's assessment and plan with the following exceptions/additions:     Briefly, this is a 45yo gentleman, active smoker, with a PMH of poorly controlled IDDM2 (A1c >14%, diagnosed 11/2020) c/b peripheral neuropathy and likely PAD s/p prior L-TMA (11/2020) who p/w weakness, CP and polyuria, found to have DKA and acute R-5th phalanx osteomyelitis.  Initially admitted to the MICU for DKA mgmt, going to the OR with Podiatry tonight for an amputation and then being transferred to [facility].      #Acute R foot OM - c/w Vanc and Zosyn, f/u OR cultures, will need ID's assistance for mgmt of abx; post-op PT and pain mgmt  #Poorly controlled IDDM2 c/b DKA - transitioning to SubQ insulin w/Endo's assistance; please ensure he meets with the DM Educator and Dietician  #PAD R/O - multiple risk factors along w/exam findings c/f PAD; in light of prior amputations and need for good wound healing, would consult Vascular Surgery (at least to setup outpatient eval)  #HLD - c/w stating; f/u AM lipid panel as none in our system   #DVT PPx - on hold for the OR  #Dispo - TBD (comes from a SRO; unclear if would be able to manage IV abx on his own)     Latanya Gardner  [phone] Patient was seen and examined with the resident team today.  I agree with Dr. Prakash's assessment and plan with the following exceptions/additions:     Briefly, this is a 45yo gentleman, active smoker, with a PMH of poorly controlled IDDM2 (A1c >14%, diagnosed 11/2020) c/b peripheral neuropathy and likely PAD s/p prior L-TMA (11/2020) who p/w weakness, CP and polyuria, found to have DKA and severe sepsis acute R-5th phalanx osteomyelitis.  Initially admitted to the MICU for DKA mgmt, going to the OR with Podiatry tonight for an amputation and then being transferred to [facility].      #Acute R foot OM c/b severe sepsis - c/w Vanc and Zosyn, f/u OR cultures, will need ID's assistance for mgmt of abx; post-op PT and pain mgmt  #Poorly controlled IDDM2 c/b DKA - transitioning to SubQ insulin w/Endo's assistance; please ensure he meets with the DM Educator   #PAD R/O - multiple risk factors along w/exam findings c/f PAD; in light of prior amputations and need for good wound healing, would consult Vascular Surgery (at least to setup outpatient eval)  #HLD - c/w stating; f/u AM lipid panel as none in our system   #Underweight - ensure Dietician meets with him   #DVT PPx - on hold for the OR  #Dispo - TBD (comes from a SRO; unclear if would be able to manage IV abx on his own)     Latanya Gardner  [phone]

## 2021-12-01 NOTE — PROGRESS NOTE ADULT - PROBLEM SELECTOR PLAN 1
Pt w/ known h/o DKA and h/o amputation of L toes 11/2020 2/2 diabetic neuropathy.  - XR R foot: relative osteopenia of the fifth ray, equivocal for sequelae of acute osteomyelitis.  - s/p vanc/zosyn x1 in ED   - c/w vanc/zosyn  - podiatry consulted, pre-op for partial R foot amputation 12/01   - CT right foot w/ contrast showing extensive soft tissue defect with air infiltration extending into the fifth metatarsal, proximal and middle phalanges of the fifth digit c/w osteomyelitis   - f/u BCs and UCs Pt w/ known h/o DKA and h/o amputation of L toes 11/2020 2/2 diabetic neuropathy.  - XR R foot: relative osteopenia of the fifth ray, equivocal for sequelae of acute osteomyelitis.  - s/p vanc/zosyn x1 in ED   - c/w vanc/zosyn  - podiatry consulted, pre-op for partial R foot amputation 12/01   - CT right foot w/ contrast showing extensive soft tissue defect with air infiltration extending into the fifth metatarsal, proximal and middle phalanges of the fifth digit c/w osteomyelitis   - f/u BCs and UCs  -ID consult tomorrow for duration of abx; patient will likely need picc Pt w/ known h/o DKA and h/o amputation of L toes 11/2020 2/2 diabetic neuropathy.  - XR R foot: relative osteopenia of the fifth ray, equivocal for sequelae of acute osteomyelitis.  - s/p vanc/zosyn x1 in ED   - c/w vanc/zosyn  - podiatry consulted, pre-op for partial R foot amputation 12/01   - CT right foot w/ contrast showing extensive soft tissue defect with air infiltration extending into the fifth metatarsal, proximal and middle phalanges of the fifth digit c/w osteomyelitis   - f/u BCs and UCs  -ID consult tomorrow for duration of abx; patient will likely need picc  -PT consult in AM  -vascular consult in AM as patient with likely underlying PAD

## 2021-12-01 NOTE — BRIEF OPERATIVE NOTE - NSICDXBRIEFPOSTOP_GEN_ALL_CORE_FT
POST-OP DIAGNOSIS:  Acute osteomyelitis of metatarsal bone, right 01-Dec-2021 20:46:43  Leon Bates

## 2021-12-01 NOTE — PROGRESS NOTE ADULT - ASSESSMENT
45yo M PMH IDDM (diagnosed 11/2020) presented from [facility] complaining of chest pain. Reports polyuria, polydipsia and decreased appetite x1 week. Of note, pt was diagnosed w/ DM last year 11/2020 at which time he was admitted for DKA and all toes on his L foot were amputated.    Podiatry was consulted for the management of the right foot infected diabetic ulcer. Pt was noted to have gas on xray near the 5th metatarsal, although the xray read did not mention it. Right foot is malodorous and 5ccs of purulent drainage was expressed from the proximal aspect of the ulcer (dorsally over the 4th and 5th metatarsal). MICU was notified about the findings of gas on xray. Podiatry Attending discussed with MICU Attending about the importance of taking the patient to the OR immediately, however given his abn electrolyte (Potassium) and possibility of patient having arrythmia' s and increased glucose-- it was decided that we wait until patient has finished his drip and has labs have normalized. Re-evaluated in AM (12/1), pt vital signs and clinically improved. OR pushed back to evening due to emergent case in AM.     Plan:  - Added on for OR in evening  - Pt consented and consent placed in chart   - F/U CT of Right foot   - Dressed R foot with DSD    Plan d/w Attending  Podiatry Following

## 2021-12-01 NOTE — DIETITIAN INITIAL EVALUATION ADULT. - PERTINENT MEDS FT
Zosyn,   Vancomycin  Dextrose  Glucagon  Tylenol  Atorvastatin  calcium carbonate  Pantoprazole   Simethicone  Insulin infusion (has since been d/c)

## 2021-12-01 NOTE — DIETITIAN INITIAL EVALUATION ADULT. - OTHER CALCULATIONS
%IBW=76; ABW used to calculate estimated needs d/t low BMI.  Adjusted for diabetic ulcer, sepsis. %IBW=76; ABW used to calculate estimated needs d/t low BMI.  Adjusted for sepsis.

## 2021-12-01 NOTE — BRIEF OPERATIVE NOTE - OPERATION/FINDINGS
Open approach taken for partial ray amputation of right 5th metatarsal. Non viable tissue and bone was resected, followed by wash out. Cultures were sent. Wound probed along plantar aspect of foot medially as well as plantar proximally towards the calcaneus. Wound was packed with betadine soaked packing and left open. Dressed with ABD, DSD, and ACE wrap.

## 2021-12-01 NOTE — PROGRESS NOTE ADULT - PROBLEM SELECTOR PLAN 3
non-anion gap metabolic acidosis, compensated  ABG w/ pCO2 24, metabolic acidosis appropriately compensated according to Winter's formula  - currently satting well on RA  -AG wnl today non-anion gap metabolic acidosis, compensated. likely 2/2 to resolving DKA.  ABG w/ pCO2 24, metabolic acidosis appropriately compensated according to Winter's formula  - currently satting well on RA  -AG wnl today

## 2021-12-01 NOTE — DIETITIAN INITIAL EVALUATION ADULT. - PERTINENT LABORATORY DATA
Hemoglobin: 12.0 (L)   Hematocrit; 35.2 (L)  POCT: >600 on arrival, trended down to 220 (H)   CO2: 21 (L)   BUN/Cr: 37/1.12 (H), trended down  Phosphorus: 2.2 (L)  ALP: 191 (H)   AST: 9 (L)   ALT: 8 (L)   A1C: 14.3 (H)

## 2021-12-01 NOTE — CONSULT NOTE ADULT - ATTENDING COMMENTS
Pt seen on rounds this afternoon.  46-yo man whose DM was diagnosed 1 year ago when he presented with DKA and apparent gangrene L foot.  Underwent TMA at that time, started on insulin--but stopped the insulin on his own a few months ago.  Now presents with marked hyperglycemia (912 mg%) and elevated gap (31).  No BHB was apparently done in the ED.  Also has strong suspicion of osteo R 5th toe.  Was treated with an insulin drip, then bridged with 15 NPH at approx noon today.  Will be going to the OR shortly for debridement/poss amputation.    Based on his previous insulin requirements plus requirements on the drip, would start Lantus tonight at 35 units, premeal (if diet started) 10 units TID/AC

## 2021-12-01 NOTE — DIETITIAN NUTRITION RISK NOTIFICATION - TREATMENT: THE FOLLOWING DIET HAS BEEN RECOMMENDED
Diet, NPO:   NPO for Procedure/Test     NPO Start Date: 01-Dec-2021,   NPO Start Time: 11:00  Except Medications  With Ice Chips/Sips of Water (12-01-21 @ 10:18) [Active]      
What Type Of Note Output Would You Prefer (Optional)?: Standard Output
How Severe Is Your Rash?: mild
Is This A New Presentation, Or A Follow-Up?: Rash

## 2021-12-01 NOTE — CONSULT NOTE ADULT - SUBJECTIVE AND OBJECTIVE BOX
HPI: 47 y/o male with PMHx of poorly controlled IDDM, transferred from [facility] on  to the MICU for DKA and sepsis 2/2 suspected osteomyelitis of the right foot. Initial glucose was 912, K 6.7, A1C 14.3. Patient was started on Insulin gtt, potassium repletion, PPI for GERD, Vancomycin/Zosyn for right foot infection. Anion gap normal twice as of  7:30AM. Endo consulted. Right foot XR revealed osteopenia in fifth ray, patient scheduled for OR with podiatry for partial right foot amputation .   Of note, pt was diagnosed w/ DM last year 2020 at which time he was admitted for DKA and all toes on his L foot were amputated. Reports his maternal grandmother had DM but no other family h/o DM.  Reportedly COVID vaccinated.    ED course:  VS: T 97.9, , /85, RR 20, SpO2 100% on RA  Labs: wbc 19.97, Hgb 13.1, K 6.7, AG 31, Glucose 912, lactate 2.3  Interventions: insulin gtt, 3L NS  EKG: sinus tachycardia  CXR: hyperinflation  XR R foot: relative osteopenia of the fifth ray, equivocal for sequelae of acute osteomyelitis.    Age at Dx:  How dx:  Hx and duration of insulin:  Current Therapy:  Hx of hypoglycemia  Hx of DKA/HHS?    Home FSG:  Fasting  Lunch  Dinner  Bed    Hx of other regimens  Complications:  Outpatient Endo:    PMH & Surgical Hx:EVAL    Handoff    Diabetes mellitus    DKA (diabetic ketoacidosis)    EVAL    Sepsis    Diabetic foot ulcer    [email or web address]        FH:  DM:  Thyroid:  Autoimmune:  Other:    SH:  Smoking  Etoh:  Recreational Drugs:  Social Life:    Current Meds:  acetaminophen     Tablet .. 650 milliGRAM(s) Oral every 6 hours PRN  atorvastatin 20 milliGRAM(s) Oral at bedtime  calcium carbonate    500 mG (Tums) Chewable 1 Tablet(s) Chew three times a day PRN  dextrose 5%. 1000 milliLiter(s) IV Continuous <Continuous>  dextrose 50% Injectable 50 milliLiter(s) IV Push every 15 minutes  glucagon  Injectable 1 milliGRAM(s) IntraMuscular once  influenza   Vaccine 0.5 milliLiter(s) IntraMuscular once  pantoprazole    Tablet 40 milliGRAM(s) Oral before breakfast  piperacillin/tazobactam IVPB.. 3.375 Gram(s) IV Intermittent every 6 hours  simethicone 80 milliGRAM(s) Chew two times a day PRN  vancomycin  IVPB 1000 milliGRAM(s) IV Intermittent every 12 hours      Allergies:  No Known Allergies      ROS:  Denies the following except as indicated.    General: weight loss/weight gain, decreased appetite, fatigue  Eyes: Blurry vision, double vision, visual changes  ENT: Throat pain, changes in voice,   CV: palpitations, SOB, CP, cough  GI: NVD, difficulty swallowing, abdominal pain  : polyuria, dysuria  Endo: abnormal menses, temperature intolerance, decreased libido  MSK: weakness, joint pain  Skin: rash, dryness, diaphoresis  Heme: Easy bruising,bleeding  Neuro: HA, dizziness, lightheadedness, numbness tingling  Psych: Anxiety, Depression    Vital Signs Last 24 Hrs  T(C): 36.3 (01 Dec 2021 09:00), Max: 36.4 (2021 17:34)  T(F): 97.4 (01 Dec 2021 09:00), Max: 97.6 (01 Dec 2021 06:08)  HR: 87 (01 Dec 2021 13:00) (87 - 116)  BP: 136/80 (01 Dec 2021 13:00) (117/79 - 147/104)  BP(mean): 103 (01 Dec 2021 13:00) (91 - 112)  RR: 18 (01 Dec 2021 05:00) (10 - 24)  SpO2: 100% (01 Dec 2021 13:00) (95% - 100%)  Height (cm): 203.2 (-30 @ 18:24)  Weight (kg): 77.6 (11-30 @ 18:24)  BMI (kg/m2): 18.8 (11-30 @ 18:24)      Constitutional: wn/wd in NAD.   HEENT: NCAT, MMM, OP clear, EOMI, , no proptosis or lid retraction  Neck: no thyromegaly or palpable thyroid nodules   Respiratory: lungs CTAB.  Cardiovascular: regular rhythm, normal S1 and S2, no audible murmurs, no peripheral edema  GI: soft, NT/ND, no masses/HSM appreciated.  Neurology: no tremors, DTR 2+  Skin: no visible rashes/lesions  Psychiatric: AAO x 3, normal affect/mood.  Ext: radial pulses intact, DP pulses intact, extremities warm, no cyanosis, clubbing or edema.       LABS:                        12.0   14.64 )-----------( 570      ( 01 Dec 2021 03:26 )             35.2     12    136  |  100  |  37<H>  ----------------------------<  223<H>  4.2   |  21<L>  |  1.12    Ca    10.1      01 Dec 2021 07:34  Phos  2.2       Mg     2.1         TPro  9.1<H>  /  Alb  3.2<L>  /  TBili  0.2  /  DBili  x   /  AST  9<L>  /  ALT  8<L>  /  AlkPhos  191<H>      PT/INR - ( 01 Dec 2021 03:26 )   PT: 14.7 sec;   INR: 1.24          PTT - ( 01 Dec 2021 03:26 )  PTT:35.6 sec  Urinalysis Basic - ( 2021 15:59 )    Color: Yellow / Appearance: Clear / S.020 / pH: x  Gluc: x / Ketone: >=80 mg/dL  / Bili: Small / Urobili: 0.2 E.U./dL   Blood: x / Protein: NEGATIVE mg/dL / Nitrite: NEGATIVE   Leuk Esterase: NEGATIVE / RBC: < 5 /HPF / WBC < 5 /HPF   Sq Epi: x / Non Sq Epi: 0-5 /HPF / Bacteria: Present /HPF            RADIOLOGY & ADDITIONAL STUDIES:  CAPILLARY BLOOD GLUCOSE      POCT Blood Glucose.: 220 mg/dL (01 Dec 2021 12:11)  POCT Blood Glucose.: 206 mg/dL (01 Dec 2021 11:06)  POCT Blood Glucose.: 204 mg/dL (01 Dec 2021 10:16)  POCT Blood Glucose.: 239 mg/dL (01 Dec 2021 09:06)  POCT Blood Glucose.: 209 mg/dL (01 Dec 2021 08:06)  POCT Blood Glucose.: 256 mg/dL (01 Dec 2021 07:06)  POCT Blood Glucose.: 188 mg/dL (01 Dec 2021 06:33)  POCT Blood Glucose.: 240 mg/dL (01 Dec 2021 05:09)  POCT Blood Glucose.: 245 mg/dL (01 Dec 2021 04:06)  POCT Blood Glucose.: 269 mg/dL (01 Dec 2021 03:14)  POCT Blood Glucose.: 269 mg/dL (01 Dec 2021 02:20)  POCT Blood Glucose.: 291 mg/dL (01 Dec 2021 01:21)  POCT Blood Glucose.: 289 mg/dL (01 Dec 2021 00:26)  POCT Blood Glucose.: 344 mg/dL (2021 23:18)  POCT Blood Glucose.: 316 mg/dL (2021 22:07)  POCT Blood Glucose.: 369 mg/dL (2021 21:06)  POCT Blood Glucose.: 375 mg/dL (2021 20:05)  POCT Blood Glucose.: 544 mg/dL (2021 19:02)  POCT Blood Glucose.: 472 mg/dL (2021 18:31)  POCT Blood Glucose.: 559 mg/dL (2021 16:30)  POCT Blood Glucose.: >600 mg/dL (2021 15:36)  POCT Blood Glucose.: >600 mg/dL (2021 15:34)  POCT Blood Glucose.: >600 mg/dL (2021 14:34)        A/P:46y Male    1.  DM  Please continue lantus       units at night.    Please continue lispro      units before each meal.    Please continue lispro moderate dose sliding scale four times daily with meals and at bedtime    Pt's fingerstick glucose goal is 100-180    Will continue to monitor     For discharge, pt can continue    Pt can follow up at discharge with [facility] by calling  to make an appointment.   Will discuss case with Dr. Haines    and update primary team HPI: 47 y/o male with PMHx of poorly controlled IDDM, transferred from [facility] on  to the MICU for DKA and sepsis 2/2 suspected osteomyelitis of the right foot. Initial glucose was 912, K 6.7, A1C 14.3. Patient was started on Insulin gtt, potassium repletion, PPI for GERD, Vancomycin/Zosyn for right foot infection. Anion gap normal twice as of  7:30AM.     Endo consulted. Right foot XR revealed osteomyelitis in fifth ray, patient scheduled for OR with podiatry for partial right foot amputation .       ED course:  VS: T 97.9, , /85, RR 20, SpO2 100% on RA  Labs: wbc 19.97, Hgb 13.1, K 6.7, AG 31, Glucose 912, lactate 2.3  Interventions: insulin gtt, 3L NS  EKG: sinus tachycardia  CXR: hyperinflation  XR R foot: relative osteopenia of the fifth ray, equivocal for sequelae of acute osteomyelitis.    Of note, pt was diagnosed w/ DM last year 2020 at which time he was admitted for DKA and all toes on his L foot were amputated.   Stopped taking all insulin and checking fingersticks a few months ago. Reports he has been losing a lot of weight.   He was only ever on insulin.  Home regimen: 50 units Basiglar and 15 units Novolog with meals. Reports a PCP assigned to him by insurance has been prescribing it.   FH: maternal grandmother had DM but no other family h/o DM.    SH: former smoker and drinker       Current Meds:  acetaminophen     Tablet .. 650 milliGRAM(s) Oral every 6 hours PRN  atorvastatin 20 milliGRAM(s) Oral at bedtime  calcium carbonate    500 mG (Tums) Chewable 1 Tablet(s) Chew three times a day PRN  dextrose 5%. 1000 milliLiter(s) IV Continuous <Continuous>  dextrose 50% Injectable 50 milliLiter(s) IV Push every 15 minutes  glucagon  Injectable 1 milliGRAM(s) IntraMuscular once  influenza   Vaccine 0.5 milliLiter(s) IntraMuscular once  pantoprazole    Tablet 40 milliGRAM(s) Oral before breakfast  piperacillin/tazobactam IVPB.. 3.375 Gram(s) IV Intermittent every 6 hours  simethicone 80 milliGRAM(s) Chew two times a day PRN  vancomycin  IVPB 1000 milliGRAM(s) IV Intermittent every 12 hours      Allergies:  No Known Allergies      ROS:  Denies the following except as indicated.    General: weight loss/weight gain, decreased appetite, fatigue  Eyes: Blurry vision, double vision, visual changes  ENT: Throat pain, changes in voice,   CV: palpitations, SOB, CP, cough  GI: NVD, difficulty swallowing, abdominal pain  : polyuria, dysuria  Endo: abnormal menses, temperature intolerance, decreased libido  MSK: weakness, joint pain  Skin: rash, dryness, diaphoresis  Heme: Easy bruising,bleeding  Neuro: HA, dizziness, lightheadedness, numbness tingling  Psych: Anxiety, Depression    Vital Signs Last 24 Hrs  T(C): 36.3 (01 Dec 2021 09:00), Max: 36.4 (2021 17:34)  T(F): 97.4 (01 Dec 2021 09:00), Max: 97.6 (01 Dec 2021 06:08)  HR: 87 (01 Dec 2021 13:00) (87 - 116)  BP: 136/80 (01 Dec 2021 13:00) (117/79 - 147/104)  BP(mean): 103 (01 Dec 2021 13:00) (91 - 112)  RR: 18 (01 Dec 2021 05:00) (10 - 24)  SpO2: 100% (01 Dec 2021 13:00) (95% - 100%)  Height (cm): 203.2 (11-30 @ 18:24)  Weight (kg): 77.6 (11-30 @ 18:24)  BMI (kg/m2): 18.8 (11-30 @ 18:24)      PHYSICAL EXAM:  Constitutional: malnourished male, alert in no acute distress.  HEENT: head atraumatic, PERRL, EMOI, Oropharyngeal mucosa moist, pink, tongue midline, Neck supple, FROM, no LAD  Respiratory: Clear to auscultation bilaterally.  No rales, rhonchi, wheezes.  Cardiovascular: Regular rate and rhythm.  S1, S2 heard.  Gastrointestinal:  Soft, nontender, nondistended, decreased bowel sounds  Vascular: b/l LE cool to touch, 2+ b/l radial pulses, 0-1+ DP pulses b/l, LLE s/p amputation of all toes.   Neurological: AAOx3, 5/5 strength, decreased sensation in b/l LE   Skin: b/l feet wrapped in gauze.       LABS:                        12.0   14.64 )-----------( 570      ( 01 Dec 2021 03:26 )             35.2     12    136  |  100  |  37<H>  ----------------------------<  223<H>  4.2   |  21<L>  |  1.12    Ca    10.1      01 Dec 2021 07:34  Phos  2.2       Mg     2.1         TPro  9.1<H>  /  Alb  3.2<L>  /  TBili  0.2  /  DBili  x   /  AST  9<L>  /  ALT  8<L>  /  AlkPhos  191<H>      PT/INR - ( 01 Dec 2021 03:26 )   PT: 14.7 sec;   INR: 1.24          PTT - ( 01 Dec 2021 03:26 )  PTT:35.6 sec  Urinalysis Basic - ( 2021 15:59 )    Color: Yellow / Appearance: Clear / S.020 / pH: x  Gluc: x / Ketone: >=80 mg/dL  / Bili: Small / Urobili: 0.2 E.U./dL   Blood: x / Protein: NEGATIVE mg/dL / Nitrite: NEGATIVE   Leuk Esterase: NEGATIVE / RBC: < 5 /HPF / WBC < 5 /HPF   Sq Epi: x / Non Sq Epi: 0-5 /HPF / Bacteria: Present /HPF            RADIOLOGY & ADDITIONAL STUDIES:  CAPILLARY BLOOD GLUCOSE      POCT Blood Glucose.: 220 mg/dL (01 Dec 2021 12:11)  POCT Blood Glucose.: 206 mg/dL (01 Dec 2021 11:06)  POCT Blood Glucose.: 204 mg/dL (01 Dec 2021 10:16)  POCT Blood Glucose.: 239 mg/dL (01 Dec 2021 09:06)  POCT Blood Glucose.: 209 mg/dL (01 Dec 2021 08:06)  POCT Blood Glucose.: 256 mg/dL (01 Dec 2021 07:06)  POCT Blood Glucose.: 188 mg/dL (01 Dec 2021 06:33)  POCT Blood Glucose.: 240 mg/dL (01 Dec 2021 05:09)  POCT Blood Glucose.: 245 mg/dL (01 Dec 2021 04:06)  POCT Blood Glucose.: 269 mg/dL (01 Dec 2021 03:14)  POCT Blood Glucose.: 269 mg/dL (01 Dec 2021 02:20)  POCT Blood Glucose.: 291 mg/dL (01 Dec 2021 01:21)  POCT Blood Glucose.: 289 mg/dL (01 Dec 2021 00:26)  POCT Blood Glucose.: 344 mg/dL (2021 23:18)  POCT Blood Glucose.: 316 mg/dL (2021 22:07)  POCT Blood Glucose.: 369 mg/dL (2021 21:06)  POCT Blood Glucose.: 375 mg/dL (2021 20:05)  POCT Blood Glucose.: 544 mg/dL (2021 19:02)  POCT Blood Glucose.: 472 mg/dL (2021 18:31)  POCT Blood Glucose.: 559 mg/dL (2021 16:30)  POCT Blood Glucose.: >600 mg/dL (2021 15:36)  POCT Blood Glucose.: >600 mg/dL (2021 15:34)  POCT Blood Glucose.: >600 mg/dL (2021 14:34)        A/P:46y Male poorly controlled diabetic with right foot osteomyelitis admitted with DKA and in need of amputation.   DKA resolved   A1C: 14.3%  Wt: 77.6kg  Cr: 1.12  GFR: 91  Home regimen: 50 units Basiglar, 15 units Novolog       CT scan: Extensive soft tissue defect with air infiltration extending into the marrow of the head of the fifth metatarsal, proximal and middle phalanges of the fifth digit, consistent with osteomyelitis.    1.  DM type 2? - uncontrolled   Please continue lantus  35  units at night.    Please continue lispro  10    units before each meal.    Please continue lispro moderate dose sliding scale four times daily with meals and at bedtime  Would restart insulin drip if fingersticks start to run high       Pt's fingerstick glucose goal is 100-180    Will continue to monitor       Pt can follow up at discharge with [facility] by calling  to make an appointment.   Will discuss case with Dr. Haines    and update primary team

## 2021-12-01 NOTE — PROGRESS NOTE ADULT - SUBJECTIVE AND OBJECTIVE BOX
************TRANSFER FROM MICU TO [facility]*************    Hospital Course: Mr Barragan is a 45 y/o male with PMHx of poorly controlled IDDM, transferred from [facility] on  to the MICU for DKA and sepsis 2/2 suspected osteomyelitis of the right foot. Initial glucose was 912, K 6.7, A1C 14.3. Patient was started on Insulin gtt, potassium repletion, PPI for GERD, Vancomycin/Zosyn for right foot infection. Anion gap normal twice as of  7:30AM, off Insulin gtt. Endo consulted. Right foot XR revealed osteopenia in fifth ray, patient scheduled for OR with podiatry for partial right foot amputation .     SUBJECTIVE / INTERVAL HPI: Patient seen and examined at bedside. Reports feeling well physically but says he is tired and mentally drained and just wants to get his surgery over with. Also very thirsty. Denies any pain and says his breathing is fine. Patient denying chest pain, SOB, palpitations, cough. Patient denies fever, chills, HA, Dizziness, change in vision/hearing, N/V, abdominal pain, diarrhea, constipation, hematochezia/melena, dysuria, hematuria, new onset weakness/numbness, LE pain and/or swelling. Reports he does not follow with an endocrinologist. Sees his PCP 'once in a blue moon' but does not remember the name. Reports he does not take his insulin as much as he should at home.   Remaining ROS negative       PHYSICAL EXAM:    General: sitting up in bed, NAD   HEENT: NC/AT; PERRL, anicteric sclera; MMM  Neck: supple, no JVD   Cardiovascular: +S1/S2, RRR, no murmurs   Respiratory: CTA B/L; no W/R/R. no increased WOB.   Gastrointestinal: soft, NT/ND; +BSx4  Extremities: WWP; no edema, clubbing or cyanosis. L foot s/p toes amputated, wrapped in gauze. RLE wrapped in gauze.   Vascular: 2+ radial pulses B/L  Neurological: AAOx3; decreased sensation in b/l LEs. 5/5 muscle strength.  Dermatologic: no appreciable wounds or damage to the skin. dry flaking skin on b/l shins.    VITAL SIGNS:  Vital Signs Last 24 Hrs  T(C): 36.4 (01 Dec 2021 14:27), Max: 36.4 (2021 17:34)  T(F): 97.6 (01 Dec 2021 14:27), Max: 97.6 (01 Dec 2021 06:08)  HR: 96 (01 Dec 2021 15:00) (87 - 116)  BP: 163/83 (01 Dec 2021 15:00) (117/79 - 163/83)  BP(mean): 116 (01 Dec 2021 15:00) (91 - 116)  RR: 18 (01 Dec 2021 15:00) (10 - 24)  SpO2: 100% (01 Dec 2021 15:00) (95% - 100%)      MEDICATIONS:  MEDICATIONS  (STANDING):  atorvastatin 20 milliGRAM(s) Oral at bedtime  dextrose 40% Gel 15 Gram(s) Oral once  dextrose 5%. 1000 milliLiter(s) (100 mL/Hr) IV Continuous <Continuous>  dextrose 50% Injectable 50 milliLiter(s) IV Push every 15 minutes  glucagon  Injectable 1 milliGRAM(s) IntraMuscular once  influenza   Vaccine 0.5 milliLiter(s) IntraMuscular once  insulin regular  human corrective regimen sliding scale   SubCutaneous every 6 hours  pantoprazole    Tablet 40 milliGRAM(s) Oral before breakfast  piperacillin/tazobactam IVPB.. 3.375 Gram(s) IV Intermittent every 6 hours  vancomycin  IVPB 1000 milliGRAM(s) IV Intermittent every 12 hours    MEDICATIONS  (PRN):  acetaminophen     Tablet .. 650 milliGRAM(s) Oral every 6 hours PRN Temp greater or equal to 38C (100.4F), Mild Pain (1 - 3)  calcium carbonate    500 mG (Tums) Chewable 1 Tablet(s) Chew three times a day PRN Dyspepsia  simethicone 80 milliGRAM(s) Chew two times a day PRN Heartburn      ALLERGIES:  Allergies    No Known Allergies    Intolerances        LABS:                        12.0   14.64 )-----------( 570      ( 01 Dec 2021 03:26 )             35.2     12    136  |  100  |  37<H>  ----------------------------<  223<H>  4.2   |  21<L>  |  1.12    Ca    10.1      01 Dec 2021 07:34  Phos  2.2       Mg     2.1         TPro  9.1<H>  /  Alb  3.2<L>  /  TBili  0.2  /  DBili  x   /  AST  9<L>  /  ALT  8<L>  /  AlkPhos  191<H>      PT/INR - ( 01 Dec 2021 03:26 )   PT: 14.7 sec;   INR: 1.24          PTT - ( 01 Dec 2021 03:26 )  PTT:35.6 sec  Urinalysis Basic - ( 2021 15:59 )    Color: Yellow / Appearance: Clear / S.020 / pH: x  Gluc: x / Ketone: >=80 mg/dL  / Bili: Small / Urobili: 0.2 E.U./dL   Blood: x / Protein: NEGATIVE mg/dL / Nitrite: NEGATIVE   Leuk Esterase: NEGATIVE / RBC: < 5 /HPF / WBC < 5 /HPF   Sq Epi: x / Non Sq Epi: 0-5 /HPF / Bacteria: Present /HPF      CAPILLARY BLOOD GLUCOSE      POCT Blood Glucose.: 220 mg/dL (01 Dec 2021 12:11)      RADIOLOGY & ADDITIONAL TESTS: Reviewed.

## 2021-12-01 NOTE — DIETITIAN INITIAL EVALUATION ADULT. - ADD RECOMMEND
1. Advance diet to Consistent Carbohydrate diet. Monitor %PO intake, monitor diet tolerance. 2. Consider ONS if pt not meeting EER via PO itnake when diet is advaned. 3. Conduct NFPE during next assessment to assess for malnutrition status. 4. Provide DM education. 5. Pain and bowel regimen per team. 6. Monitor lytes and replete prn. 7. RD to follow

## 2021-12-02 DIAGNOSIS — N17.9 ACUTE KIDNEY FAILURE, UNSPECIFIED: ICD-10-CM

## 2021-12-02 DIAGNOSIS — D64.9 ANEMIA, UNSPECIFIED: ICD-10-CM

## 2021-12-02 DIAGNOSIS — E87.1 HYPO-OSMOLALITY AND HYPONATREMIA: ICD-10-CM

## 2021-12-02 LAB
ALBUMIN SERPL ELPH-MCNC: 2.6 G/DL — LOW (ref 3.3–5)
ALP SERPL-CCNC: 159 U/L — HIGH (ref 40–120)
ALT FLD-CCNC: <5 U/L — LOW (ref 10–45)
ANION GAP SERPL CALC-SCNC: 12 MMOL/L — SIGNIFICANT CHANGE UP (ref 5–17)
AST SERPL-CCNC: 9 U/L — LOW (ref 10–40)
BASOPHILS # BLD AUTO: 0.06 K/UL — SIGNIFICANT CHANGE UP (ref 0–0.2)
BASOPHILS NFR BLD AUTO: 0.5 % — SIGNIFICANT CHANGE UP (ref 0–2)
BILIRUB SERPL-MCNC: 0.4 MG/DL — SIGNIFICANT CHANGE UP (ref 0.2–1.2)
BUN SERPL-MCNC: 28 MG/DL — HIGH (ref 7–23)
CALCIUM SERPL-MCNC: 9.4 MG/DL — SIGNIFICANT CHANGE UP (ref 8.4–10.5)
CHLORIDE SERPL-SCNC: 96 MMOL/L — SIGNIFICANT CHANGE UP (ref 96–108)
CHOLEST SERPL-MCNC: 143 MG/DL — SIGNIFICANT CHANGE UP
CO2 SERPL-SCNC: 21 MMOL/L — LOW (ref 22–31)
CREAT ?TM UR-MCNC: 73 MG/DL — SIGNIFICANT CHANGE UP
CREAT SERPL-MCNC: 1.31 MG/DL — HIGH (ref 0.5–1.3)
EOSINOPHIL # BLD AUTO: 0.03 K/UL — SIGNIFICANT CHANGE UP (ref 0–0.5)
EOSINOPHIL NFR BLD AUTO: 0.2 % — SIGNIFICANT CHANGE UP (ref 0–6)
GLUCOSE BLDC GLUCOMTR-MCNC: 188 MG/DL — HIGH (ref 70–99)
GLUCOSE BLDC GLUCOMTR-MCNC: 191 MG/DL — HIGH (ref 70–99)
GLUCOSE BLDC GLUCOMTR-MCNC: 216 MG/DL — HIGH (ref 70–99)
GLUCOSE BLDC GLUCOMTR-MCNC: 219 MG/DL — HIGH (ref 70–99)
GLUCOSE BLDC GLUCOMTR-MCNC: 326 MG/DL — HIGH (ref 70–99)
GLUCOSE SERPL-MCNC: 283 MG/DL — HIGH (ref 70–99)
HCT VFR BLD CALC: 29.1 % — LOW (ref 39–50)
HCT VFR BLD CALC: 29.7 % — LOW (ref 39–50)
HDLC SERPL-MCNC: 31 MG/DL — LOW
HGB BLD-MCNC: 9.9 G/DL — LOW (ref 13–17)
HGB BLD-MCNC: 9.9 G/DL — LOW (ref 13–17)
IMM GRANULOCYTES NFR BLD AUTO: 4.9 % — HIGH (ref 0–1.5)
LIPID PNL WITH DIRECT LDL SERPL: 83 MG/DL — SIGNIFICANT CHANGE UP
LYMPHOCYTES # BLD AUTO: 1.03 K/UL — SIGNIFICANT CHANGE UP (ref 1–3.3)
LYMPHOCYTES # BLD AUTO: 7.9 % — LOW (ref 13–44)
MAGNESIUM SERPL-MCNC: 1.8 MG/DL — SIGNIFICANT CHANGE UP (ref 1.6–2.6)
MCHC RBC-ENTMCNC: 28.9 PG — SIGNIFICANT CHANGE UP (ref 27–34)
MCHC RBC-ENTMCNC: 29.6 PG — SIGNIFICANT CHANGE UP (ref 27–34)
MCHC RBC-ENTMCNC: 33.3 GM/DL — SIGNIFICANT CHANGE UP (ref 32–36)
MCHC RBC-ENTMCNC: 34 GM/DL — SIGNIFICANT CHANGE UP (ref 32–36)
MCV RBC AUTO: 86.6 FL — SIGNIFICANT CHANGE UP (ref 80–100)
MCV RBC AUTO: 87.1 FL — SIGNIFICANT CHANGE UP (ref 80–100)
MONOCYTES # BLD AUTO: 1.32 K/UL — HIGH (ref 0–0.9)
MONOCYTES NFR BLD AUTO: 10.1 % — SIGNIFICANT CHANGE UP (ref 2–14)
NEUTROPHILS # BLD AUTO: 10.01 K/UL — HIGH (ref 1.8–7.4)
NEUTROPHILS NFR BLD AUTO: 76.4 % — SIGNIFICANT CHANGE UP (ref 43–77)
NON HDL CHOLESTEROL: 112 MG/DL — SIGNIFICANT CHANGE UP
NRBC # BLD: 0 /100 WBCS — SIGNIFICANT CHANGE UP (ref 0–0)
NRBC # BLD: 0 /100 WBCS — SIGNIFICANT CHANGE UP (ref 0–0)
PHOSPHATE SERPL-MCNC: 2.5 MG/DL — SIGNIFICANT CHANGE UP (ref 2.5–4.5)
PLATELET # BLD AUTO: 474 K/UL — HIGH (ref 150–400)
PLATELET # BLD AUTO: 503 K/UL — HIGH (ref 150–400)
POTASSIUM SERPL-MCNC: 3.9 MMOL/L — SIGNIFICANT CHANGE UP (ref 3.5–5.3)
POTASSIUM SERPL-SCNC: 3.9 MMOL/L — SIGNIFICANT CHANGE UP (ref 3.5–5.3)
PROT SERPL-MCNC: 7.6 G/DL — SIGNIFICANT CHANGE UP (ref 6–8.3)
RBC # BLD: 3.34 M/UL — LOW (ref 4.2–5.8)
RBC # BLD: 3.43 M/UL — LOW (ref 4.2–5.8)
RBC # FLD: 13.5 % — SIGNIFICANT CHANGE UP (ref 10.3–14.5)
RBC # FLD: 13.5 % — SIGNIFICANT CHANGE UP (ref 10.3–14.5)
SODIUM SERPL-SCNC: 129 MMOL/L — LOW (ref 135–145)
SODIUM UR-SCNC: <20 MMOL/L — SIGNIFICANT CHANGE UP
TRIGL SERPL-MCNC: 147 MG/DL — SIGNIFICANT CHANGE UP
VANCOMYCIN TROUGH SERPL-MCNC: 21.4 UG/ML — HIGH (ref 10–20)
WBC # BLD: 13.09 K/UL — HIGH (ref 3.8–10.5)
WBC # BLD: 13.19 K/UL — HIGH (ref 3.8–10.5)
WBC # FLD AUTO: 13.09 K/UL — HIGH (ref 3.8–10.5)
WBC # FLD AUTO: 13.19 K/UL — HIGH (ref 3.8–10.5)

## 2021-12-02 PROCEDURE — 73630 X-RAY EXAM OF FOOT: CPT | Mod: 26,LT

## 2021-12-02 PROCEDURE — 93010 ELECTROCARDIOGRAM REPORT: CPT

## 2021-12-02 PROCEDURE — 99252 IP/OBS CONSLTJ NEW/EST SF 35: CPT | Mod: GC

## 2021-12-02 PROCEDURE — 99233 SBSQ HOSP IP/OBS HIGH 50: CPT | Mod: GC

## 2021-12-02 PROCEDURE — 93925 LOWER EXTREMITY STUDY: CPT | Mod: 26

## 2021-12-02 PROCEDURE — 99254 IP/OBS CNSLTJ NEW/EST MOD 60: CPT

## 2021-12-02 PROCEDURE — 99231 SBSQ HOSP IP/OBS SF/LOW 25: CPT | Mod: GC

## 2021-12-02 RX ORDER — CEFTRIAXONE 500 MG/1
2000 INJECTION, POWDER, FOR SOLUTION INTRAMUSCULAR; INTRAVENOUS EVERY 24 HOURS
Refills: 0 | Status: DISCONTINUED | OUTPATIENT
Start: 2021-12-02 | End: 2021-12-07

## 2021-12-02 RX ORDER — INSULIN GLARGINE 100 [IU]/ML
50 INJECTION, SOLUTION SUBCUTANEOUS AT BEDTIME
Refills: 0 | Status: DISCONTINUED | OUTPATIENT
Start: 2021-12-02 | End: 2021-12-04

## 2021-12-02 RX ORDER — SODIUM HYPOCHLORITE 0.125 %
1 SOLUTION, NON-ORAL MISCELLANEOUS DAILY
Refills: 0 | Status: DISCONTINUED | OUTPATIENT
Start: 2021-12-02 | End: 2021-12-07

## 2021-12-02 RX ORDER — SUCRALFATE 1 G
1 TABLET ORAL ONCE
Refills: 0 | Status: COMPLETED | OUTPATIENT
Start: 2021-12-02 | End: 2021-12-02

## 2021-12-02 RX ORDER — MAGNESIUM SULFATE 500 MG/ML
2 VIAL (ML) INJECTION ONCE
Refills: 0 | Status: COMPLETED | OUTPATIENT
Start: 2021-12-02 | End: 2021-12-02

## 2021-12-02 RX ORDER — BACLOFEN 100 %
5 POWDER (GRAM) MISCELLANEOUS THREE TIMES A DAY
Refills: 0 | Status: DISCONTINUED | OUTPATIENT
Start: 2021-12-02 | End: 2021-12-06

## 2021-12-02 RX ORDER — SODIUM CHLORIDE 9 MG/ML
1000 INJECTION INTRAMUSCULAR; INTRAVENOUS; SUBCUTANEOUS
Refills: 0 | Status: DISCONTINUED | OUTPATIENT
Start: 2021-12-02 | End: 2021-12-03

## 2021-12-02 RX ORDER — HEPARIN SODIUM 5000 [USP'U]/ML
5000 INJECTION INTRAVENOUS; SUBCUTANEOUS EVERY 12 HOURS
Refills: 0 | Status: DISCONTINUED | OUTPATIENT
Start: 2021-12-02 | End: 2021-12-02

## 2021-12-02 RX ORDER — HEPARIN SODIUM 5000 [USP'U]/ML
5000 INJECTION INTRAVENOUS; SUBCUTANEOUS EVERY 8 HOURS
Refills: 0 | Status: DISCONTINUED | OUTPATIENT
Start: 2021-12-02 | End: 2021-12-05

## 2021-12-02 RX ORDER — INSULIN LISPRO 100/ML
12 VIAL (ML) SUBCUTANEOUS
Refills: 0 | Status: DISCONTINUED | OUTPATIENT
Start: 2021-12-02 | End: 2021-12-03

## 2021-12-02 RX ORDER — PANTOPRAZOLE SODIUM 20 MG/1
40 TABLET, DELAYED RELEASE ORAL
Refills: 0 | Status: DISCONTINUED | OUTPATIENT
Start: 2021-12-02 | End: 2021-12-06

## 2021-12-02 RX ORDER — POTASSIUM PHOSPHATE, MONOBASIC POTASSIUM PHOSPHATE, DIBASIC 236; 224 MG/ML; MG/ML
15 INJECTION, SOLUTION INTRAVENOUS ONCE
Refills: 0 | Status: COMPLETED | OUTPATIENT
Start: 2021-12-02 | End: 2021-12-02

## 2021-12-02 RX ADMIN — POTASSIUM PHOSPHATE, MONOBASIC POTASSIUM PHOSPHATE, DIBASIC 62.5 MILLIMOLE(S): 236; 224 INJECTION, SOLUTION INTRAVENOUS at 19:23

## 2021-12-02 RX ADMIN — Medication 1 GRAM(S): at 16:11

## 2021-12-02 RX ADMIN — INSULIN HUMAN 8: 100 INJECTION, SOLUTION SUBCUTANEOUS at 09:04

## 2021-12-02 RX ADMIN — PANTOPRAZOLE SODIUM 40 MILLIGRAM(S): 20 TABLET, DELAYED RELEASE ORAL at 06:05

## 2021-12-02 RX ADMIN — INSULIN HUMAN 2: 100 INJECTION, SOLUTION SUBCUTANEOUS at 19:21

## 2021-12-02 RX ADMIN — ATORVASTATIN CALCIUM 20 MILLIGRAM(S): 80 TABLET, FILM COATED ORAL at 23:11

## 2021-12-02 RX ADMIN — Medication 5 MILLIGRAM(S): at 23:11

## 2021-12-02 RX ADMIN — PIPERACILLIN AND TAZOBACTAM 200 GRAM(S): 4; .5 INJECTION, POWDER, LYOPHILIZED, FOR SOLUTION INTRAVENOUS at 10:08

## 2021-12-02 RX ADMIN — SODIUM CHLORIDE 80 MILLILITER(S): 9 INJECTION INTRAMUSCULAR; INTRAVENOUS; SUBCUTANEOUS at 09:28

## 2021-12-02 RX ADMIN — Medication 10 UNIT(S): at 12:26

## 2021-12-02 RX ADMIN — INSULIN GLARGINE 50 UNIT(S): 100 INJECTION, SOLUTION SUBCUTANEOUS at 23:11

## 2021-12-02 RX ADMIN — PANTOPRAZOLE SODIUM 40 MILLIGRAM(S): 20 TABLET, DELAYED RELEASE ORAL at 19:03

## 2021-12-02 RX ADMIN — Medication 650 MILLIGRAM(S): at 09:31

## 2021-12-02 RX ADMIN — HEPARIN SODIUM 5000 UNIT(S): 5000 INJECTION INTRAVENOUS; SUBCUTANEOUS at 23:11

## 2021-12-02 RX ADMIN — PIPERACILLIN AND TAZOBACTAM 200 GRAM(S): 4; .5 INJECTION, POWDER, LYOPHILIZED, FOR SOLUTION INTRAVENOUS at 03:54

## 2021-12-02 RX ADMIN — Medication 12 UNIT(S): at 19:23

## 2021-12-02 RX ADMIN — Medication 10 UNIT(S): at 09:03

## 2021-12-02 RX ADMIN — Medication 50 GRAM(S): at 11:17

## 2021-12-02 RX ADMIN — Medication 650 MILLIGRAM(S): at 08:31

## 2021-12-02 RX ADMIN — Medication 1 APPLICATION(S): at 12:25

## 2021-12-02 RX ADMIN — CEFTRIAXONE 100 MILLIGRAM(S): 500 INJECTION, POWDER, FOR SOLUTION INTRAMUSCULAR; INTRAVENOUS at 16:11

## 2021-12-02 RX ADMIN — INSULIN HUMAN 4: 100 INJECTION, SOLUTION SUBCUTANEOUS at 12:25

## 2021-12-02 RX ADMIN — Medication 250 MILLIGRAM(S): at 14:11

## 2021-12-02 RX ADMIN — HEPARIN SODIUM 5000 UNIT(S): 5000 INJECTION INTRAVENOUS; SUBCUTANEOUS at 14:10

## 2021-12-02 RX ADMIN — INFLUENZA VIRUS VACCINE 0.5 MILLILITER(S): 15; 15; 15; 15 SUSPENSION INTRAMUSCULAR at 10:08

## 2021-12-02 NOTE — PROGRESS NOTE ADULT - ATTENDING COMMENTS
Pt. seen and examined by me earlier today; I have read Dr. Prakash's note, I agree w/ her findings and plan of care as documented; cont. work-up and mgmt per Podiatry, Vascular, ID, and Endocrine recs

## 2021-12-02 NOTE — PHYSICAL THERAPY INITIAL EVALUATION ADULT - GENERAL OBSERVATIONS, REHAB EVAL
PT IE Completed. Pt recieved semi-supine in bed, A&Ox4, +RA, in NAD, c/d/i L foot gauze dressing and R foot ace wrap dressing with sanguineous drainage and agreeable to work with PT, MADDIE Lowe notified. Podiatry and MD Prakash clear pt for PT with RLE NWB restrictions.Pt left as found, NAD, +call [email or web address] notified. Continue PT in order to improve strength, balance and endurance with functional mobility. MD Prakash notified.

## 2021-12-02 NOTE — CONSULT NOTE ADULT - ASSESSMENT
47 yo male with uncontrolled DM A1c 14.3, hx L toe amputations 2020, now p/w DKA in setting of R DFI/5th MT osteomyelitis s/p partial 5th Ray amputation (+purulence and malodor in OR) with cultures growing GBS as well as rare Enterococcus and Proteus; suspect GBS to be the primary pathogen here.   - f/u vascular surgery plan ?anticipated need for R BKA  - d/c vancomycin and Zosyn and start ceftriaxone 2g IV q24h  - needs optimization of glycemic control and linkage to diabetes care    d/w primary team    ID Team 2

## 2021-12-02 NOTE — PHYSICAL THERAPY INITIAL EVALUATION ADULT - IMPAIRMENTS FOUND, PT EVAL
aerobic capacity/endurance/gait, locomotion, and balance/muscle strength/neuromotor development and sensory integration/poor safety awareness/posture

## 2021-12-02 NOTE — PROGRESS NOTE ADULT - PROBLEM SELECTOR PLAN 7
Patient with increasing Cr today at 1.31, no history of CKD  -likely pre-renal from hypovolemia/dehydration yesterday as pt was NPO   -started on NS 80cc/hr  -f/u urine lytes   -renally dose meds  -continue to trend

## 2021-12-02 NOTE — PROGRESS NOTE ADULT - SUBJECTIVE AND OBJECTIVE BOX
INTERVAL HPI/OVERNIGHT EVENTS:    Patient is a 46y old  Male who presents with a chief complaint of DKA (02 Dec 2021 14:14)  Patient went for amputation of 5th toe on right foot and I&D of purulent drainage. Left foot also appears to have purulent draining would as well. Patient going for arterial dopplers today.   He reports poor appetite. Denies pain of the feet. Denies fever, chills, SOB. Reports some nausea.     ROS as above.     MEDICATIONS  (STANDING):  atorvastatin 20 milliGRAM(s) Oral at bedtime  baclofen 5 milliGRAM(s) Oral three times a day  cefTRIAXone   IVPB 2000 milliGRAM(s) IV Intermittent every 24 hours  Dakins Solution - 1/4 Strength 1 Application(s) Topical daily  dextrose 40% Gel 15 Gram(s) Oral once  dextrose 5%. 1000 milliLiter(s) (100 mL/Hr) IV Continuous <Continuous>  dextrose 50% Injectable 50 milliLiter(s) IV Push every 15 minutes  glucagon  Injectable 1 milliGRAM(s) IntraMuscular once  heparin   Injectable 5000 Unit(s) SubCutaneous every 8 hours  insulin glargine Injectable (LANTUS) 50 Unit(s) SubCutaneous at bedtime  insulin lispro Injectable (ADMELOG) 12 Unit(s) SubCutaneous three times a day before meals  insulin regular  human corrective regimen sliding scale   SubCutaneous every 6 hours  pantoprazole    Tablet 40 milliGRAM(s) Oral two times a day  potassium phosphate IVPB 15 milliMole(s) IV Intermittent once  sodium chloride 0.9%. 1000 milliLiter(s) (80 mL/Hr) IV Continuous <Continuous>    MEDICATIONS  (PRN):  acetaminophen     Tablet .. 650 milliGRAM(s) Oral every 6 hours PRN Temp greater or equal to 38C (100.4F), Mild Pain (1 - 3)  calcium carbonate    500 mG (Tums) Chewable 1 Tablet(s) Chew three times a day PRN Dyspepsia  oxyCODONE    IR 5 milliGRAM(s) Oral every 6 hours PRN Moderate Pain (4 - 6)  simethicone 80 milliGRAM(s) Chew two times a day PRN Heartburn      PHYSICAL EXAM  Vital Signs Last 24 Hrs  T(C): 36.8 (02 Dec 2021 14:00), Max: 36.8 (02 Dec 2021 14:00)  T(F): 98.3 (02 Dec 2021 14:00), Max: 98.3 (02 Dec 2021 14:00)  HR: 90 (02 Dec 2021 14:00) (80 - 99)  BP: 117/79 (02 Dec 2021 14:00) (101/68 - 159/88)  BP(mean): 86 (02 Dec 2021 09:57) (79 - 118)  RR: 18 (02 Dec 2021 14:00) (16 - 26)  SpO2: 98% (02 Dec 2021 14:00) (95% - 99%)    PHYSICAL EXAM:  Constitutional: malnourished male, alert in no acute distress.  HEENT: head atraumatic, PERRL, EMOI, Oropharyngeal mucosa moist, pink, tongue midline, Neck supple, FROM, no LAD  Respiratory: Clear to auscultation bilaterally.  No rales, rhonchi, wheezes.  Cardiovascular: Regular rate and rhythm.  S1, S2 heard.  Gastrointestinal:  Soft, nontender, nondistended, decreased bowel sounds  Vascular: b/l LE cool to touch, 2+ b/l radial pulses, 0-1+ DP pulses b/l, LLE s/p amputation of all toes.   Neurological: AAOx3, 5/5 strength, decreased sensation in b/l LE   Skin: b/l feet wrapped in gauze. malodorous       LABS:                        9.9    13.19 )-----------( 503      ( 02 Dec 2021 15:26 )             29.7     12-02    129<L>  |  96  |  28<H>  ----------------------------<  283<H>  3.9   |  21<L>  |  1.31<H>    Ca    9.4      02 Dec 2021 07:47  Phos  2.5     12-02  Mg     1.8     12-02    TPro  7.6  /  Alb  2.6<L>  /  TBili  0.4  /  DBili  x   /  AST  9<L>  /  ALT  <5<L>  /  AlkPhos  159<H>  12-02    PT/INR - ( 01 Dec 2021 03:26 )   PT: 14.7 sec;   INR: 1.24          PTT - ( 01 Dec 2021 03:26 )  PTT:35.6 sec        HbA1C:         Insulin Sliding Scale requirements X 24 Hours:      RADIOLOGY & ADDITIONAL TESTS:      A/P:46y Male poorly controlled diabetic with right foot osteomyelitis admitted with DKA and in need of amputation.   DKA resolved   A1C: 14.3%  Wt: 77.6kg  Cr: 1.12  GFR: 91  Home regimen: 50 units Basiglar, 15 units Novolog       s/p partial amputation of R 5th toe yesterday. deep wound cultures from surgery growing group B strep, enterococcus, proteus.  per podiatry infection seeding to calcaneous and may need BKA    1.  DM type 2? - uncontrolled   Please continue lantus  50  units at night.    Please continue lispro  12   units before each meal.    Please continue lispro moderate dose sliding scale four times daily with meals and at bedtime       Pt's fingerstick glucose goal is 100-180    Will continue to monitor       Pt can follow up at discharge with [facility] by calling  to make an appointment.   Will discuss case with Dr. Haines    and update primary team

## 2021-12-02 NOTE — PROGRESS NOTE ADULT - PROBLEM SELECTOR PLAN 6
Patient with Na of 129 today, likely hypovolemic as patient NPO yesterday for OR and likely dehydrated. will likely improve with increased PO intake today   -started on NS 80cc/hr   -f/u urine lytes

## 2021-12-02 NOTE — PROGRESS NOTE ADULT - PROBLEM SELECTOR PLAN 1
Pt w/ known h/o DKA and h/o amputation of L toes 11/2020 2/2 diabetic neuropathy.  - XR R foot: relative osteopenia of the fifth ray, equivocal for sequelae of acute osteomyelitis.  - c/w vanc/zosyn  - s/p partial R foot amputation 12/01 by podiatry  -wound care per podiatry   - per podiatry wound probed plantar proximally towards calcaneus, pt may require BKA  - f/u post op foot & ankle XR  - NWB to RLE  - CT right foot w/ contrast showing extensive soft tissue defect with air infiltration extending into the fifth metatarsal, proximal and middle phalanges of the fifth digit c/w osteomyelitis   -BCs 12/1 NGTD  -wound cultures from 12/1 growing group B strep  -deep wound cultures from surgical site 12/1 growing gram + cocci in pairs and chains  -ID consulted for duration of abx; patient will likely need picc  -PT consulted, recs [facility]  -vascular consulted as patient with likely underlying PAD and eval for possible BKA, f/u recs

## 2021-12-02 NOTE — PHYSICAL THERAPY INITIAL EVALUATION ADULT - MANUAL MUSCLE TESTING RESULTS, REHAB EVAL
b/l foot dressing. Pt able to DF/PF his ankles bilaterally; atleast 3/5 MMT./grossly assessed due to

## 2021-12-02 NOTE — PROGRESS NOTE ADULT - ASSESSMENT
Mr. Barragan is a 45 y/o male with PMHx of poorly controlled IDDM and diabetic neuropathy BIBEMS for CP, found to have DKA, admitted to MICU for insulin gtt and severe sepsis 2/2 OM of R foot. Anion gap closed x2 as of 12/01, off Insulin gtt, now s/p partial right toe amputation being treated for OM of right foot.

## 2021-12-02 NOTE — PHYSICAL THERAPY INITIAL EVALUATION ADULT - PHYSICAL ASSIST/NONPHYSICAL ASSIST: SIT/STAND, REHAB EVAL
Frequent cuing for NWB on RLE/set-up required/verbal cues/nonverbal cues (demo/gestures)/1 person assist

## 2021-12-02 NOTE — PHYSICAL THERAPY INITIAL EVALUATION ADULT - BED MOBILITY LIMITATIONS, REHAB EVAL
Pt reported 2-3/10 dizziness upon sitting EOB, resolved in a few mins with seated rest./decreased ability to use legs for bridging/pushing

## 2021-12-02 NOTE — PROGRESS NOTE ADULT - SUBJECTIVE AND OBJECTIVE BOX
OVERNIGHT EVENTS: phlebotomy missed vanc trough draw. drawn at midnight, supratherapeutic. per pharmacy, SKIP 230am dose. resume at next scheduled dose (230pm).    SUBJECTIVE / INTERVAL HPI: Patient seen and examined at bedside. Reports feeling well and that his surgery went well. Reports throbbing pain in his right foot since he returned from the OR however well-controlled on current medication regimen. Denies any other complaints. Patient denying chest pain, SOB, palpitations, cough. Patient denies fever, chills, HA, Dizziness, change in vision/hearing, N/V, abdominal pain, diarrhea, constipation, hematochezia/melena, dysuria, hematuria, new onset weakness/numbness.  Remaining ROS negative       PHYSICAL EXAM:    General: sitting up in bed, NAD   HEENT: NC/AT; PERRL, anicteric sclera; MMM  Neck: supple, no JVD   Cardiovascular: +S1/S2, RRR, no murmurs   Respiratory: CTA B/L; no W/R/R. no increased WOB.   Gastrointestinal: soft, NT/ND; +BSx4  Extremities: WWP; no edema, clubbing or cyanosis. L foot s/p toes amputated, wrapped in gauze. RLE wrapped in gauze and ACE.   Vascular: 2+ radial pulses B/L  Neurological: AAOx3; decreased sensation in b/l LEs. 5/5 muscle strength.  Dermatologic: no appreciable wounds or damage to the skin. dry flaking skin on b/l shins.    VITAL SIGNS:  Vital Signs Last 24 Hrs  T(C): 36.4 (02 Dec 2021 08:42), Max: 36.4 (01 Dec 2021 14:27)  T(F): 97.5 (02 Dec 2021 08:42), Max: 97.6 (01 Dec 2021 14:27)  HR: 91 (02 Dec 2021 09:57) (80 - 100)  BP: 111/73 (02 Dec 2021 09:57) (101/68 - 163/83)  BP(mean): 86 (02 Dec 2021 09:57) (79 - 118)  RR: 18 (02 Dec 2021 08:42) (16 - 26)  SpO2: 97% (02 Dec 2021 09:57) (95% - 100%)      MEDICATIONS:  MEDICATIONS  (STANDING):  atorvastatin 20 milliGRAM(s) Oral at bedtime  Dakins Solution - 1/4 Strength 1 Application(s) Topical daily  dextrose 40% Gel 15 Gram(s) Oral once  dextrose 5%. 1000 milliLiter(s) (100 mL/Hr) IV Continuous <Continuous>  dextrose 50% Injectable 50 milliLiter(s) IV Push every 15 minutes  glucagon  Injectable 1 milliGRAM(s) IntraMuscular once  insulin glargine Injectable (LANTUS) 35 Unit(s) SubCutaneous at bedtime  insulin lispro Injectable (ADMELOG) 10 Unit(s) SubCutaneous three times a day before meals  insulin regular  human corrective regimen sliding scale   SubCutaneous every 6 hours  magnesium sulfate  IVPB 2 Gram(s) IV Intermittent once  pantoprazole    Tablet 40 milliGRAM(s) Oral before breakfast  piperacillin/tazobactam IVPB.. 3.375 Gram(s) IV Intermittent every 6 hours  potassium phosphate IVPB 15 milliMole(s) IV Intermittent once  sodium chloride 0.9%. 1000 milliLiter(s) (80 mL/Hr) IV Continuous <Continuous>  vancomycin  IVPB 1000 milliGRAM(s) IV Intermittent every 12 hours    MEDICATIONS  (PRN):  acetaminophen     Tablet .. 650 milliGRAM(s) Oral every 6 hours PRN Temp greater or equal to 38C (100.4F), Mild Pain (1 - 3)  calcium carbonate    500 mG (Tums) Chewable 1 Tablet(s) Chew three times a day PRN Dyspepsia  oxyCODONE    IR 5 milliGRAM(s) Oral every 6 hours PRN Moderate Pain (4 - 6)  simethicone 80 milliGRAM(s) Chew two times a day PRN Heartburn      ALLERGIES:  Allergies    No Known Allergies    Intolerances        LABS:                        9.9    13.09 )-----------( 474      ( 02 Dec 2021 07:47 )             29.1     12-02    129<L>  |  96  |  28<H>  ----------------------------<  283<H>  3.9   |  21<L>  |  1.31<H>    Ca    9.4      02 Dec 2021 07:47  Phos  2.5     12-02  Mg     1.8     12-02    TPro  7.6  /  Alb  2.6<L>  /  TBili  0.4  /  DBili  x   /  AST  9<L>  /  ALT  <5<L>  /  AlkPhos  159<H>  12-02    PT/INR - ( 01 Dec 2021 03:26 )   PT: 14.7 sec;   INR: 1.24          PTT - ( 01 Dec 2021 03:26 )  PTT:35.6 sec  Urinalysis Basic - ( 2021 15:59 )    Color: Yellow / Appearance: Clear / S.020 / pH: x  Gluc: x / Ketone: >=80 mg/dL  / Bili: Small / Urobili: 0.2 E.U./dL   Blood: x / Protein: NEGATIVE mg/dL / Nitrite: NEGATIVE   Leuk Esterase: NEGATIVE / RBC: < 5 /HPF / WBC < 5 /HPF   Sq Epi: x / Non Sq Epi: 0-5 /HPF / Bacteria: Present /HPF      CAPILLARY BLOOD GLUCOSE      POCT Blood Glucose.: 326 mg/dL (02 Dec 2021 08:50)      RADIOLOGY & ADDITIONAL TESTS: Reviewed.

## 2021-12-02 NOTE — PHYSICAL THERAPY INITIAL EVALUATION ADULT - PERTINENT HX OF CURRENT PROBLEM, REHAB EVAL
45yo M IDDM and diabetic neuropathy BIBEMS for CP, found to have DKA, admitted to MICU for insulin gtt and severe sepsis 2/2 possible OM of R foot. Now s/p R foot partial amp 12/1/21

## 2021-12-02 NOTE — CONSULT NOTE ADULT - SUBJECTIVE AND OBJECTIVE BOX
HPI:  45yo M PMH IDDM (diagnosed 2020) presented from [facility] complaining of chest pain. Pt states he has not been feeling well and has had "a lot going on", but that he called EMS for new onset chest pain, substernal and pressure-like. Reports polyuria, polydipsia and decreased appetite x1 week. Says his last meal was approx 1 week ago but that he had 2 iced coffees yesterday. Also has had hiccups for past "few days", and reportedly vomited x1 at home today.  Of note, pt was diagnosed w/ DM last year 2020 at which time he was admitted for DKA and all toes on his L foot were amputated. Reports his maternal grandmother had DM but no other family h/o DM.  Reportedly COVID vaccinated.   (2021 19:30)      PAST MEDICAL & SURGICAL HISTORY:  Diabetes mellitus          REVIEW OF SYSTEMS:    General:	 no weakness; no fevers, no chills  Skin/Breast: no rash  Respiratory and Thorax: no SOB, no cough  Cardiovascular:	No chest pain  Gastrointestinal:	 no nausea, vomiting , diarrhea  Genitourinary:	no dysuria, no difficulty urinating, no hematuria  Musculoskeletal:	no weakness, no joint swelling/pain  Neurological:	no focal weakness/numbness  Endocrine:	no polyuria, no polydipsia      ANTIBIOTICS:  MEDICATIONS  (STANDING):  atorvastatin 20 milliGRAM(s) Oral at bedtime  Dakins Solution - 1/4 Strength 1 Application(s) Topical daily  dextrose 40% Gel 15 Gram(s) Oral once  dextrose 5%. 1000 milliLiter(s) (100 mL/Hr) IV Continuous <Continuous>  dextrose 50% Injectable 50 milliLiter(s) IV Push every 15 minutes  glucagon  Injectable 1 milliGRAM(s) IntraMuscular once  heparin   Injectable 5000 Unit(s) SubCutaneous every 8 hours  insulin glargine Injectable (LANTUS) 35 Unit(s) SubCutaneous at bedtime  insulin lispro Injectable (ADMELOG) 10 Unit(s) SubCutaneous three times a day before meals  insulin regular  human corrective regimen sliding scale   SubCutaneous every 6 hours  pantoprazole    Tablet 40 milliGRAM(s) Oral before breakfast  piperacillin/tazobactam IVPB.. 3.375 Gram(s) IV Intermittent every 6 hours  potassium phosphate IVPB 15 milliMole(s) IV Intermittent once  sodium chloride 0.9%. 1000 milliLiter(s) (80 mL/Hr) IV Continuous <Continuous>  vancomycin  IVPB 1000 milliGRAM(s) IV Intermittent every 12 hours    MEDICATIONS  (PRN):  acetaminophen     Tablet .. 650 milliGRAM(s) Oral every 6 hours PRN Temp greater or equal to 38C (100.4F), Mild Pain (1 - 3)  calcium carbonate    500 mG (Tums) Chewable 1 Tablet(s) Chew three times a day PRN Dyspepsia  oxyCODONE    IR 5 milliGRAM(s) Oral every 6 hours PRN Moderate Pain (4 - 6)  simethicone 80 milliGRAM(s) Chew two times a day PRN Heartburn      Allergies    No Known Allergies    Intolerances        SOCIAL HISTORY:    FAMILY HISTORY:      Vital Signs Last 24 Hrs  T(C): 36.8 (02 Dec 2021 14:00), Max: 36.8 (02 Dec 2021 14:00)  T(F): 98.3 (02 Dec 2021 14:00), Max: 98.3 (02 Dec 2021 14:00)  HR: 90 (02 Dec 2021 14:00) (80 - 99)  BP: 117/79 (02 Dec 2021 14:00) (101/68 - 163/83)  BP(mean): 86 (02 Dec 2021 09:57) (79 - 118)  RR: 18 (02 Dec 2021 14:00) (16 - 26)  SpO2: 98% (02 Dec 2021 14:00) (95% - 100%)    PHYSICAL EXAM:  Constitutional:Well-developed, well nourished  Eyes:LUIS DANIEL, EOMI  Ear/Nose/Throat: no oral lesion, no sinus tenderness on percussion	  Neck:no JVD, no lymphadenopathy, supple  Respiratory: CTA dunia  Cardiovascular: S1S2 RRR, no murmurs  Gastrointestinal:soft, (+) BS, no HSM  Extremities:no e/e/c; R foot dressing intact; no edema or erythema tracking up proximal RLE  Vascular: DP Pulse:	right normal; left normal            LABS:                        9.9    13.09 )-----------( 474      ( 02 Dec 2021 07:47 )             29.1     12-02    129<L>  |  96  |  28<H>  ----------------------------<  283<H>  3.9   |  21<L>  |  1.31<H>    Ca    9.4      02 Dec 2021 07:47  Phos  2.5       Mg     1.8         TPro  7.6  /  Alb  2.6<L>  /  TBili  0.4  /  DBili  x   /  AST  9<L>  /  ALT  <5<L>  /  AlkPhos  159<H>      PT/INR - ( 01 Dec 2021 03:26 )   PT: 14.7 sec;   INR: 1.24          PTT - ( 01 Dec 2021 03:26 )  PTT:35.6 sec  Urinalysis Basic - ( 2021 15:59 )    Color: Yellow / Appearance: Clear / S.020 / pH: x  Gluc: x / Ketone: >=80 mg/dL  / Bili: Small / Urobili: 0.2 E.U./dL   Blood: x / Protein: NEGATIVE mg/dL / Nitrite: NEGATIVE   Leuk Esterase: NEGATIVE / RBC: < 5 /HPF / WBC < 5 /HPF   Sq Epi: x / Non Sq Epi: 0-5 /HPF / Bacteria: Present /HPF        MICROBIOLOGY: Culture - Surgical Swab (21 @ 19:48)    Gram Stain:   Moderate Gram Positive Cocci in Pairs and Chains  Moderate White blood cells    Specimen Source: .Surgical Swab deep wound right foot    Culture Results:   Moderate Streptococcus agalactiae (Group B)  Rare to few Enterococcus faecalis  Rare to few Proteus mirabilis  Susceptibility to follow.  Culture in progress      RADIOLOGY & ADDITIONAL STUDIES: < from: CT Foot w/ IV Cont, Right (21 @ 13:57) >  FINDINGS: CT of the right foot was performed in the axial plane after the administration contrast. Sections were performed in the sagittal and coronal planes. Reference is made to prior radiograph dated 2021.    Evaluation of the foot extensive soft tissue defect defect with air infiltrating around fifth digit. There is extensive soft tissue edematous infiltration. There is air extending into the bone marrow of the fifth metatarsal, proximal and middle phalanges of the fifth digit. There is hindfoot valgus. Tarsometatarsal joint is in appropriate alignment. Tibiotalar, talonavicular and calcaneocuboid joints are in appropriate alignment. Hindfoot valgus with pes planus. Dorsal osteophytosis. Osteophytosis of the anterior and posterior tibiotalar joint, which may contribute to ankle impingement. There is osteophytosis of the Achilles tendon. There is a bipartite medial sesamoid. There is calcification adjacent to the distal fibula.          IMPRESSION:    Extensive soft tissue defect with air infiltration extending into the marrow of the headof the fifth metatarsal, proximal and middle phalanges of the fifth digit, consistent with osteomyelitis.    < end of copied text >

## 2021-12-02 NOTE — PROGRESS NOTE ADULT - ATTENDING COMMENTS
Underwent partial ray amputation of R 5th metatarsal last night, but now with concern about the status of the left foot due to drainage.  Having arterial Dopplers done today  Glucoses still in the 250-350 range despite 35 units Lantus last night  PO intake has been quite poor, mostly due to his infection, but also ?? due to the GI side-effects of Group B strep.  With his blood sugars this high despite the additional premeal insulin in the setting of such poor intake, would assume that we are seeing marked insufficiency of basal insulin.  Will increase the Lantus to 50 units, the premeal only to 12 units

## 2021-12-02 NOTE — PROGRESS NOTE ADULT - PROBLEM SELECTOR PLAN 4
According to med rec pt prescribed Atorvastatin 20mg qhs, though denies h/o HLD and did not report taking this medication.  - c/w atorvastatin 20mg qhs  -lipid panel wnl except low HDL

## 2021-12-02 NOTE — PROGRESS NOTE ADULT - SUBJECTIVE AND OBJECTIVE BOX
Subjective: MISSY ON. Pt seen and examined at bedside with chief resident. Examination today revealed wound on Left foot plantar aspect. +malodor. Xrays ordered for Left foot to r/o abscess/ gas.     ROS:   Denies Headache, blurred vision, Chest Pain, SOB, Abdominal pain, nausea or vomiting     Social   piperacillin/tazobactam IVPB.. 3.375  vancomycin  IVPB 1000  piperacillin/tazobactam IVPB.. 3.375  vancomycin  IVPB 1000      Allergies    No Known Allergies    Intolerances        Vital Signs Last 24 Hrs  T(C): 36.1 (01 Dec 2021 22:05), Max: 36.4 (01 Dec 2021 14:27)  T(F): 97 (01 Dec 2021 22:05), Max: 97.6 (01 Dec 2021 14:27)  HR: 95 (01 Dec 2021 22:05) (87 - 101)  BP: 122/82 (01 Dec 2021 22:05) (120/73 - 163/83)  BP(mean): 90 (01 Dec 2021 20:40) (90 - 118)  RR: 17 (01 Dec 2021 22:05) (16 - 26)  SpO2: 98% (01 Dec 2021 22:05) (95% - 100%)  I&O's Summary    01 Dec 2021 07:01  -  02 Dec 2021 07:00  --------------------------------------------------------  IN: 2877.4 mL / OUT: 600 mL / NET: 2277.4 mL        Physical Exam:  GEN: NAD, AAOx3, resting comfortably with pain controlled      LE Focused: CFT shows adequate perfusion b/l with no signs of ischemic compromise.  No strikethrough is appreciated on surgical dressings.  Dressings were dry, clean, and intact.  No neuromuscular deficits appreciated. Right foot wound has been left open on the lateral aspect s/p partial 5th ray amp. 2 incisions present dorsally (one over the 1st intermetatarsal space and 2nd over the 3rd intermetatarsal space)-- packed with gauze. New wound at plantar aspect of L foot TMA site. + malodor. Hyperkeratotic skin present diffusely plantarly on L foot.         LABS:                        9.9    13.09 )-----------( 474      ( 02 Dec 2021 07:47 )             29.1     12-01    136  |  100  |  37<H>  ----------------------------<  223<H>  4.2   |  21<L>  |  1.12    Ca    10.1      01 Dec 2021 07:34  Phos  2.2     12-01  Mg     2.1     12-01    TPro  9.1<H>  /  Alb  3.2<L>  /  TBili  0.2  /  DBili  x   /  AST  9<L>  /  ALT  8<L>  /  AlkPhos  191<H>  12-01    PT/INR - ( 01 Dec 2021 03:26 )   PT: 14.7 sec;   INR: 1.24          PTT - ( 01 Dec 2021 03:26 )  PTT:35.6 sec    Radiology:  < from: CT Foot w/ IV Cont, Right (12.01.21 @ 13:57) >  Extensive soft tissue defect with air infiltration extending into the marrow of the headof the fifth metatarsal, proximal and middle phalanges of the fifth digit, consistent with osteomyelitis.    < end of copied text >      Microbiology:

## 2021-12-02 NOTE — CONSULT NOTE ADULT - SUBJECTIVE AND OBJECTIVE BOX
HPI:  47yo M PMH IDDM (diagnosed 2020) presented from [facility] complaining of chest pain. Pt states he has not been feeling well and has had "a lot going on", but that he called EMS for new onset chest pain, substernal and pressure-like. Reports polyuria, polydipsia and decreased appetite x1 week. Says his last meal was approx 1 week ago but that he had 2 iced coffees yesterday. Also has had hiccups for past "few days", and reportedly vomited x1 at home today.  Of note, pt was diagnosed w/ DM last year 2020 at which time he was admitted for DKA and all toes on his L foot were amputated. Reports his maternal grandmother had DM but no other family h/o DM.  Reportedly COVID vaccinated.    ED course:  VS: T 97.9, , /85, RR 20, SpO2 100% on RA  Labs: wbc 19.97, Hgb 13.1, K 6.7, AG 31, Glucose 912, lactate 2.3  Interventions: insulin gtt, 3L NS  EKG: sinus tachycardia  CXR: hyperinflation  XR R foot: relative osteopenia of the fifth ray, equivocal for sequelae of acute osteomyelitis. (2021 19:30)    VASCULAR SURGERY ADDENDUM:   46yoM with PMH IDDM, DKA, and L TMA 2/2 diabetic infections who presented to [facility] from [facility] for substernal chest pain associated with polyuria, polydipsia, decreased appetite and 1x emesis, glucose 917, lactate 2.3, AG 31. Admitted to MICU with DKA and treated with insulin gtt, successful weaned to sliding scale. Podiatry consulted for management of the infected diabetic ulcer of R foot. Per podiatry note, R foot XRAY showed gas and R foot malodorous with associated purulent. R foot CT scan revealed extensive soft tissue defect with air infiltration extending into the marrow of the head of the fifth metatarsal, proximal and middle phalanges of the fifth digit, consistent with osteomyelitis; therefore, taken to OR for exploration now s/p incision & drainage, wound debridement and partial ray amputation of R 5th metatarsal and discovered ability to probe to calcaneous with purulent drainage concerning for proximal extension of osteomyelitis. OR cx gram stain: GPR, on vancomycin/zosyn. Today, podiatry recognized a malodorous wound on the plantar aspect of the L foot. XRAY L foot performed with apparent gas, awaiting final radiology evaluation. Vascular surgery consult called for evaluation     PAST MEDICAL & SURGICAL HISTORY:  Diabetes mellitus        REVIEW OF SYSTEMS  Negative except as per HPI.     MEDICATIONS  (STANDING):  atorvastatin 20 milliGRAM(s) Oral at bedtime  Dakins Solution - 1/4 Strength 1 Application(s) Topical daily  dextrose 40% Gel 15 Gram(s) Oral once  dextrose 5%. 1000 milliLiter(s) (100 mL/Hr) IV Continuous <Continuous>  dextrose 50% Injectable 50 milliLiter(s) IV Push every 15 minutes  glucagon  Injectable 1 milliGRAM(s) IntraMuscular once  insulin glargine Injectable (LANTUS) 35 Unit(s) SubCutaneous at bedtime  insulin lispro Injectable (ADMELOG) 10 Unit(s) SubCutaneous three times a day before meals  insulin regular  human corrective regimen sliding scale   SubCutaneous every 6 hours  magnesium sulfate  IVPB 2 Gram(s) IV Intermittent once  pantoprazole    Tablet 40 milliGRAM(s) Oral before breakfast  piperacillin/tazobactam IVPB.. 3.375 Gram(s) IV Intermittent every 6 hours  potassium phosphate IVPB 15 milliMole(s) IV Intermittent once  sodium chloride 0.9%. 1000 milliLiter(s) (80 mL/Hr) IV Continuous <Continuous>  vancomycin  IVPB 1000 milliGRAM(s) IV Intermittent every 12 hours    MEDICATIONS  (PRN):  acetaminophen     Tablet .. 650 milliGRAM(s) Oral every 6 hours PRN Temp greater or equal to 38C (100.4F), Mild Pain (1 - 3)  calcium carbonate    500 mG (Tums) Chewable 1 Tablet(s) Chew three times a day PRN Dyspepsia  oxyCODONE    IR 5 milliGRAM(s) Oral every 6 hours PRN Moderate Pain (4 - 6)  simethicone 80 milliGRAM(s) Chew two times a day PRN Heartburn      Allergies    No Known Allergies    Intolerances      Vital Signs Last 24 Hrs  T(C): 36.4 (02 Dec 2021 08:42), Max: 36.4 (01 Dec 2021 14:27)  T(F): 97.5 (02 Dec 2021 08:42), Max: 97.6 (01 Dec 2021 14:27)  HR: 91 (02 Dec 2021 09:57) (80 - 100)  BP: 111/73 (02 Dec 2021 09:57) (101/68 - 163/83)  BP(mean): 86 (02 Dec 2021 09:57) (79 - 118)  RR: 18 (02 Dec 2021 08:42) (16 - 26)  SpO2: 97% (02 Dec 2021 09:57) (95% - 100%)    PHYSICAL EXAM:   Gen: NAD, resting comfortably   CV: NSR  Pulm: no respiratory distress on RA, CTAB   Abd: soft, ND, NTTP, no rebound or guarding   Ext: WWP, no edema   Neuro: motor/sensory grossly intact     LABS:                        9.9    13.09 )-----------( 474      ( 02 Dec 2021 07:47 )             29.1     12-02    129<L>  |  96  |  28<H>  ----------------------------<  283<H>  3.9   |  21<L>  |  1.31<H>    Ca    9.4      02 Dec 2021 07:47  Phos  2.5     12-02  Mg     1.8     12-02    TPro  7.6  /  Alb  2.6<L>  /  TBili  0.4  /  DBili  x   /  AST  9<L>  /  ALT  <5<L>  /  AlkPhos  159<H>  12-02    LIVER FUNCTIONS - ( 02 Dec 2021 07:47 )  Alb: 2.6 g/dL / Pro: 7.6 g/dL / ALK PHOS: 159 U/L / ALT: <5 U/L / AST: 9 U/L / GGT: x           PT/INR - ( 01 Dec 2021 03:26 )   PT: 14.7 sec;   INR: 1.24          PTT - ( 01 Dec 2021 03:26 )  PTT:35.6 sec  CARDIAC MARKERS ( 2021 19:01 )  x     / 0.01 ng/mL / x     / x     / x          CAPILLARY BLOOD GLUCOSE      POCT Blood Glucose.: 326 mg/dL (02 Dec 2021 08:50)  POCT Blood Glucose.: 255 mg/dL (01 Dec 2021 22:28)  POCT Blood Glucose.: 367 mg/dL (01 Dec 2021 19:19)  POCT Blood Glucose.: 362 mg/dL (01 Dec 2021 19:17)  POCT Blood Glucose.: 338 mg/dL (01 Dec 2021 16:35)  POCT Blood Glucose.: 220 mg/dL (01 Dec 2021 12:11)  POCT Blood Glucose.: 206 mg/dL (01 Dec 2021 11:06)    Urinalysis Basic - ( 2021 15:59 )    Color: Yellow / Appearance: Clear / S.020 / pH: x  Gluc: x / Ketone: >=80 mg/dL  / Bili: Small / Urobili: 0.2 E.U./dL   Blood: x / Protein: NEGATIVE mg/dL / Nitrite: NEGATIVE   Leuk Esterase: NEGATIVE / RBC: < 5 /HPF / WBC < 5 /HPF   Sq Epi: x / Non Sq Epi: 0-5 /HPF / Bacteria: Present /HPF        Culture - Surgical Swab (collected 01 Dec 2021 19:48)  Source: .Surgical Swab deep wound right foot  Gram Stain (01 Dec 2021 21:54):    Moderate Gram Positive Cocci in Pairs and Chains    Moderate White blood cells  Preliminary Report (02 Dec 2021 09:45):    Culture in progress    Culture - Other (collected 01 Dec 2021 01:01)  Source: .Other wound  Final Report (01 Dec 2021 22:31):    Culture yields growth of greater than 3 colony types of    Call client services within 7 days if further workup is clinically    indicated.    Culture includes    Numerous Streptococcus agalactiae (Group B) isolated    Group B streptococci are susceptible toampicillin,    penicillin and cefazolin, but may be resistant to    erythromycin and clindamycin.    Recommendations for intrapartum prophylaxis for Group B    streptococci are penicillin or ampicillin.    Culture - Blood (collected 01 Dec 2021 00:58)  Source: .Blood Blood-Venous  Preliminary Report (02 Dec 2021 01:02):    No growth to date.    Culture - Blood (collected 01 Dec 2021 00:58)  Source: .Blood Blood-Venous  Preliminary Report (02 Dec 2021 01:02):    No growth to date.    Culture - Urine (collected 01 Dec 2021 00:54)  Source: Clean Catch Clean Catch (Midstream)  Final Report (01 Dec 2021 22:36):    <10,000 CFU/mL Normal Urogenital Joanna         RADIOLOGY & ADDITIONAL STUDIES:  < from: CT Foot w/ IV Cont, Right (12.01.21 @ 13:57) >    EXAM:  CT FOOT ONLY IC RT                          PROCEDURE DATE:  2021          INTERPRETATION:  CLINICAL HISTORY: 46-year-old with foot infection/osteomyelitis. Diabetes.          FINDINGS: CT of the right foot was performed in the axial plane after the administration contrast. Sections were performed in the sagittal and coronal planes. Reference is made to prior radiograph dated 2021.    Evaluation of the foot extensive soft tissue defect defect with air infiltrating around fifth digit. There is extensive soft tissue edematous infiltration. There is air extending into the bone marrow of the fifth metatarsal, proximal and middle phalanges of the fifth digit. There is hindfoot valgus. Tarsometatarsal joint is in appropriate alignment. Tibiotalar, talonavicular and calcaneocuboid joints are in appropriate alignment. Hindfoot valgus with pes planus. Dorsal osteophytosis. Osteophytosis of the anterior and posterior tibiotalar joint, which may contribute to ankle impingement. There is osteophytosis of the Achilles tendon. There is a bipartite medial sesamoid. There is calcification adjacent to the distal fibula.          IMPRESSION:    Extensive soft tissue defect with air infiltration extending into the marrow of the headof the fifth metatarsal, proximal and middle phalanges of the fifth digit, consistent with osteomyelitis.          Dr. Savanah Daly can be reached at [email or web address] with questions regarding this report.    --- End of Report ---          Thankyou for the opportunity to participate in the care of this patient.      SAVANAH DALY MD; Attending Radiologist  This document has been electronically signed. Dec  1 2021  2:48PM    < end of copied text >  < from: Xray Foot AP + Lateral + Oblique, Right (21 @ 16:50) >    EXAM:  XR FOOT COMP MIN 3 VIEWS RT                           PROCEDURE DATE:  2021          INTERPRETATION:  INDICATION: Right foot infection    TECHNIQUE: 3 views of the right foot    COMPARISON: None available    FINDINGS:    There is relative osteopenia of the fifth ray, equivocal for sequelae of acute osteomyelitis.  There is no focal soft tissue abnormality.      IMPRESSION:    There is relative osteopenia of the fifth ray, equivocal for sequelae of acute osteomyelitis.    --- End ofReport ---          Thank you for the opportunity to participate in the care of this patient.      ELINA JOHNSON MD; Attending Radiologist  This document has been electronically signed. 2021  5:08PM    < end of copied text >  < from: Xray Chest 1 View AP/PA (21 @ 13:54) >    EXAM:  XR CHEST AP OR PA 1V                           PROCEDURE DATE:  2021          INTERPRETATION:  Clinical history/reason for exam: Pain.    Frontal chest.    No comparison.    Findings/  impression: Hyperinflation. Left apical granulomata. No acute focal opacity. Heart and mediastinum are unremarkable.. Left humeral osteopoikilosis    --- End of Report ---          Thank you for the opportunity to participate in the care of this patient.      NIC CHISHOLM MD; Attending Radiologist  This document has been electronically signed. Dec  1 2021  7:34AM    < end of copied text >     HPI:  47yo M PMH IDDM (diagnosed 2020) presented from [facility] complaining of chest pain. Pt states he has not been feeling well and has had "a lot going on", but that he called EMS for new onset chest pain, substernal and pressure-like. Reports polyuria, polydipsia and decreased appetite x1 week. Says his last meal was approx 1 week ago but that he had 2 iced coffees yesterday. Also has had hiccups for past "few days", and reportedly vomited x1 at home today.  Of note, pt was diagnosed w/ DM last year 2020 at which time he was admitted for DKA and all toes on his L foot were amputated. Reports his maternal grandmother had DM but no other family h/o DM.  Reportedly COVID vaccinated.    ED course:  VS: T 97.9, , /85, RR 20, SpO2 100% on RA  Labs: wbc 19.97, Hgb 13.1, K 6.7, AG 31, Glucose 912, lactate 2.3  Interventions: insulin gtt, 3L NS  EKG: sinus tachycardia  CXR: hyperinflation  XR R foot: relative osteopenia of the fifth ray, equivocal for sequelae of acute osteomyelitis. (2021 19:30)    VASCULAR SURGERY ADDENDUM:   46yoM with PMH IDDM, DKA, and L TMA ([facility]) 2/2 diabetic infections who presented to [facility] from [facility] for substernal chest pain associated with polyuria, polydipsia, decreased appetite and 1x emesis, glucose 917, lactate 2.3, AG 31. Admitted to MICU with DKA and treated with insulin gtt, successful weaned to sliding scale. Podiatry consulted for management of the infected diabetic ulcer of R foot. Per podiatry note, R foot XRAY showed gas and R foot malodorous with associated purulent. R foot CT scan revealed extensive soft tissue defect with air infiltration extending into the marrow of the head of the fifth metatarsal, proximal and middle phalanges of the fifth digit, consistent with osteomyelitis; therefore, taken to OR for exploration now s/p incision & drainage, wound debridement and partial ray amputation of R 5th metatarsal and discovered ability to probe to calcaneous with purulent drainage concerning for proximal extension of osteomyelitis. OR cx gram stain: GPR, on vancomycin/zosyn. Today, podiatry recognized a malodorous wound on the plantar aspect of the L foot. XRAY L foot performed with apparent gas, awaiting final radiology evaluation. Vascular surgery consult called for evaluation     Upon seeing the patient, states that he did not come in for lower extremities. Denies pain, foul odor, purulent drainage, tingling of the lower extremities outpatient. States that he has been feeling "unwell" for a few weeks and started having chest pain which is why he decided to present to the ED. Symptoms included nausea, emesis, exhaustion, CP, SOB. Also, notes 20-30lb weight loss over the last few months 2/2 decreased appetite. Was diagnosed with IDDM about 1.5 years ago when presented for diabetic foot ulcer to [facility]. Unsure of physician name or exact date of admission. Has been walking without issue except LE weakness.     PMH: IDDM   PSH: L TMA, L knee surgery, R 5th metatarsal ray amputation   Meds: Insulin   All: NKDA   SHX: nonsmoker, no ETOH, no drugs, lives alone in [place]   FHX: no DM, cancer    PAST MEDICAL & SURGICAL HISTORY:  Diabetes mellitus        REVIEW OF SYSTEMS  Negative except as per HPI.     MEDICATIONS  (STANDING):  atorvastatin 20 milliGRAM(s) Oral at bedtime  Dakins Solution - 1/4 Strength 1 Application(s) Topical daily  dextrose 40% Gel 15 Gram(s) Oral once  dextrose 5%. 1000 milliLiter(s) (100 mL/Hr) IV Continuous <Continuous>  dextrose 50% Injectable 50 milliLiter(s) IV Push every 15 minutes  glucagon  Injectable 1 milliGRAM(s) IntraMuscular once  insulin glargine Injectable (LANTUS) 35 Unit(s) SubCutaneous at bedtime  insulin lispro Injectable (ADMELOG) 10 Unit(s) SubCutaneous three times a day before meals  insulin regular  human corrective regimen sliding scale   SubCutaneous every 6 hours  magnesium sulfate  IVPB 2 Gram(s) IV Intermittent once  pantoprazole    Tablet 40 milliGRAM(s) Oral before breakfast  piperacillin/tazobactam IVPB.. 3.375 Gram(s) IV Intermittent every 6 hours  potassium phosphate IVPB 15 milliMole(s) IV Intermittent once  sodium chloride 0.9%. 1000 milliLiter(s) (80 mL/Hr) IV Continuous <Continuous>  vancomycin  IVPB 1000 milliGRAM(s) IV Intermittent every 12 hours    MEDICATIONS  (PRN):  acetaminophen     Tablet .. 650 milliGRAM(s) Oral every 6 hours PRN Temp greater or equal to 38C (100.4F), Mild Pain (1 - 3)  calcium carbonate    500 mG (Tums) Chewable 1 Tablet(s) Chew three times a day PRN Dyspepsia  oxyCODONE    IR 5 milliGRAM(s) Oral every 6 hours PRN Moderate Pain (4 - 6)  simethicone 80 milliGRAM(s) Chew two times a day PRN Heartburn      Allergies    No Known Allergies    Intolerances      Vital Signs Last 24 Hrs  T(C): 36.4 (02 Dec 2021 08:42), Max: 36.4 (01 Dec 2021 14:27)  T(F): 97.5 (02 Dec 2021 08:42), Max: 97.6 (01 Dec 2021 14:27)  HR: 91 (02 Dec 2021 09:57) (80 - 100)  BP: 111/73 (02 Dec 2021 09:57) (101/68 - 163/83)  BP(mean): 86 (02 Dec 2021 09:57) (79 - 118)  RR: 18 (02 Dec 2021 08:42) (16 - 26)  SpO2: 97% (02 Dec 2021 09:57) (95% - 100%)    PHYSICAL EXAM:   Gen: NAD, resting comfortably in bed, conversational   CV: NSR  Pulm: no respiratory distress on RA, speaks in full sentences   Abd: soft, ND, NTTP, no rebound or guarding   Ext: RLE: palpable femoral pulse, biphasic pop, biphasic DP, biphasic PT, no edema, distal extremity/toes slightly cool to touch, necrotic tissue with minimal bleeding at amputation site, large cavitation on dorsal aspect of lateral foot that probes deeply, LLE: palpable femoral pulse, palpable pop, biphasic DP, biphasic PT, no edema, distal extremity TMA site covered with eschar, large dorsolateral ulcer with minimal bleeding   Neuro: motor grossly intact, sensory diminished b/l    LABS:                        9.9    13.09 )-----------( 474      ( 02 Dec 2021 07:47 )             29.1     12-02    129<L>  |  96  |  28<H>  ----------------------------<  283<H>  3.9   |  21<L>  |  1.31<H>    Ca    9.4      02 Dec 2021 07:47  Phos  2.5     12  Mg     1.8     12    TPro  7.6  /  Alb  2.6<L>  /  TBili  0.4  /  DBili  x   /  AST  9<L>  /  ALT  <5<L>  /  AlkPhos  159<H>  12    LIVER FUNCTIONS - ( 02 Dec 2021 07:47 )  Alb: 2.6 g/dL / Pro: 7.6 g/dL / ALK PHOS: 159 U/L / ALT: <5 U/L / AST: 9 U/L / GGT: x           PT/INR - ( 01 Dec 2021 03:26 )   PT: 14.7 sec;   INR: 1.24          PTT - ( 01 Dec 2021 03:26 )  PTT:35.6 sec  CARDIAC MARKERS ( 2021 19:01 )  x     / 0.01 ng/mL / x     / x     / x          CAPILLARY BLOOD GLUCOSE      POCT Blood Glucose.: 326 mg/dL (02 Dec 2021 08:50)  POCT Blood Glucose.: 255 mg/dL (01 Dec 2021 22:28)  POCT Blood Glucose.: 367 mg/dL (01 Dec 2021 19:19)  POCT Blood Glucose.: 362 mg/dL (01 Dec 2021 19:17)  POCT Blood Glucose.: 338 mg/dL (01 Dec 2021 16:35)  POCT Blood Glucose.: 220 mg/dL (01 Dec 2021 12:11)  POCT Blood Glucose.: 206 mg/dL (01 Dec 2021 11:06)    Urinalysis Basic - ( 2021 15:59 )    Color: Yellow / Appearance: Clear / S.020 / pH: x  Gluc: x / Ketone: >=80 mg/dL  / Bili: Small / Urobili: 0.2 E.U./dL   Blood: x / Protein: NEGATIVE mg/dL / Nitrite: NEGATIVE   Leuk Esterase: NEGATIVE / RBC: < 5 /HPF / WBC < 5 /HPF   Sq Epi: x / Non Sq Epi: 0-5 /HPF / Bacteria: Present /HPF        Culture - Surgical Swab (collected 01 Dec 2021 19:48)  Source: .Surgical Swab deep wound right foot  Gram Stain (01 Dec 2021 21:54):    Moderate Gram Positive Cocci in Pairs and Chains    Moderate White blood cells  Preliminary Report (02 Dec 2021 09:45):    Culture in progress    Culture - Other (collected 01 Dec 2021 01:01)  Source: .Other wound  Final Report (01 Dec 2021 22:31):    Culture yields growth of greater than 3 colony types of    Call client services within 7 days if further workup is clinically    indicated.    Culture includes    Numerous Streptococcus agalactiae (Group B) isolated    Group B streptococci are susceptible toampicillin,    penicillin and cefazolin, but may be resistant to    erythromycin and clindamycin.    Recommendations for intrapartum prophylaxis for Group B    streptococci are penicillin or ampicillin.    Culture - Blood (collected 01 Dec 2021 00:58)  Source: .Blood Blood-Venous  Preliminary Report (02 Dec 2021 01:02):    No growth to date.    Culture - Blood (collected 01 Dec 2021 00:58)  Source: .Blood Blood-Venous  Preliminary Report (02 Dec 2021 01:02):    No growth to date.    Culture - Urine (collected 01 Dec 2021 00:54)  Source: Clean Catch Clean Catch (Midstream)  Final Report (01 Dec 2021 22:36):    <10,000 CFU/mL Normal Urogenital Joanna         RADIOLOGY & ADDITIONAL STUDIES:  < from: CT Foot w/ IV Cont, Right (21 @ 13:57) >    EXAM:  CT FOOT ONLY IC RT                          PROCEDURE DATE:  2021          INTERPRETATION:  CLINICAL HISTORY: 46-year-old with foot infection/osteomyelitis. Diabetes.          FINDINGS: CT of the right foot was performed in the axial plane after the administration contrast. Sections were performed in the sagittal and coronal planes. Reference is made to prior radiograph dated 2021.    Evaluation of the foot extensive soft tissue defect defect with air infiltrating around fifth digit. There is extensive soft tissue edematous infiltration. There is air extending into the bone marrow of the fifth metatarsal, proximal and middle phalanges of the fifth digit. There is hindfoot valgus. Tarsometatarsal joint is in appropriate alignment. Tibiotalar, talonavicular and calcaneocuboid joints are in appropriate alignment. Hindfoot valgus with pes planus. Dorsal osteophytosis. Osteophytosis of the anterior and posterior tibiotalar joint, which may contribute to ankle impingement. There is osteophytosis of the Achilles tendon. There is a bipartite medial sesamoid. There is calcification adjacent to the distal fibula.          IMPRESSION:    Extensive soft tissue defect with air infiltration extending into the marrow of the headof the fifth metatarsal, proximal and middle phalanges of the fifth digit, consistent with osteomyelitis.          Dr. Savanah Daly can be reached at [email or web address] with questions regarding this report.    --- End of Report ---          Thankyou for the opportunity to participate in the care of this patient.      SAVANAH DALY MD; Attending Radiologist  This document has been electronically signed. Dec  1 2021  2:48PM    < end of copied text >  < from: Xray Foot AP + Lateral + Oblique, Right (21 @ 16:50) >    EXAM:  XR FOOT COMP MIN 3 VIEWS RT                           PROCEDURE DATE:  2021          INTERPRETATION:  INDICATION: Right foot infection    TECHNIQUE: 3 views of the right foot    COMPARISON: None available    FINDINGS:    There is relative osteopenia of the fifth ray, equivocal for sequelae of acute osteomyelitis.  There is no focal soft tissue abnormality.      IMPRESSION:    There is relative osteopenia of the fifth ray, equivocal for sequelae of acute osteomyelitis.    --- End ofReport ---          Thank you for the opportunity to participate in the care of this patient.      ELINA JOHNSON MD; Attending Radiologist  This document has been electronically signed. 2021  5:08PM    < end of copied text >  < from: Xray Chest 1 View AP/PA (21 @ 13:54) >    EXAM:  XR CHEST AP OR PA 1V                           PROCEDURE DATE:  2021          INTERPRETATION:  Clinical history/reason for exam: Pain.    Frontal chest.    No comparison.    Findings/  impression: Hyperinflation. Left apical granulomata. No acute focal opacity. Heart and mediastinum are unremarkable.. Left humeral osteopoikilosis    --- End of Report ---          Thank you for the opportunity to participate in the care of this patient.      NIC CHISHOLM MD; Attending Radiologist  This document has been electronically signed. Dec  1 2021  7:34AM    < end of copied text >

## 2021-12-02 NOTE — PHYSICAL THERAPY INITIAL EVALUATION ADULT - ADDITIONAL COMMENTS
Pt lives in a 4-flight walk up [facility], alone. He is able to leave his assistive devices on the ground floor in order to take the steps. He reports he is able to walk a couple blocks to grocery shop with his rollator before having to take a seat. He wears a protective boot on his LLE. He owns a rollator, cane and rolling walker.

## 2021-12-02 NOTE — PROGRESS NOTE ADULT - PROBLEM SELECTOR PLAN 2
Patient initially admitted for DKA with glucose 912 on admission w/ AG 31, K 6.8, bicarb 11, BHB 6.1. Pt diagnosed w/ DM last year 11/2020 when he was hospitalized for DKA, also had R toe amputation during that hospitalization. Reportedly takes insulin at home but only intermittently compliant. Reportedly takes basaglar 40u qhs, novolog 1u TID, but prescribed 50u qhs, 15u TID. Meds last picked up 8/2021.  - off insulin drip , DKA resolved   - A1c 14.3%  -c/w SS for now   - endocrine consulted, recs lantus 35 units at night, lispro 10 units pre-meal TID    -c/w lispro moderate dose sliding scale four times daily with meals and at bedtime  -consider restarting insulin drip if fingersticks start to run high on current regimen   -monitor fingerstick glucose readings, goal 100-180

## 2021-12-02 NOTE — PROGRESS NOTE ADULT - PROBLEM SELECTOR PLAN 3
non-anion gap metabolic acidosis, compensated. likely 2/2 to resolving DKA.  ABG w/ pCO2 24, metabolic acidosis appropriately compensated according to Winter's formula  - currently satting well on RA  -AG wnl today

## 2021-12-02 NOTE — PHYSICAL THERAPY INITIAL EVALUATION ADULT - TRANSFER SAFETY CONCERNS NOTED: SIT/STAND, REHAB EVAL
PT educated pt with demo of NWB technique prior to performing transfer. Pt able to repeat technique back to PT. Difficulty maintaining WB./decreased balance during turns/decreased safety awareness/losing balance/inability to maintain weight-bearing restrictions w/o assist/decreased weight-shifting ability

## 2021-12-02 NOTE — CONSULT NOTE ADULT - ASSESSMENT
46yoM with PMH IDDM, DKA, and L TMA 2/2 diabetic infections who presented to from [facility] DKA and treated with insulin gtt, now on sliding scale. Podiatry consulted for management of infected diabetic ulcer of R foot with imaging concerning for osteomyelitis now s/p incision & drainage, wound debridement and partial ray amputation of R 5th metatarsal 12/1 with concern for proximal extension of osteomyelitis. Vascular surgery consulted for evaluation and management.     PENDING DISCUSSION WITH ATTENDING.   Remainder of care per primary team   Team 3c vascular surgery will **    Plan discussed with attending and chief resident.   ____________________________________________________   [facility]   Surgery  46yoM with PMH IDDM, DKA, and L TMA 2/2 diabetic infections who presented to from [facility] DKA and treated with insulin gtt, now on sliding scale. Podiatry consulted for management of infected diabetic ulcer of R foot with imaging concerning for osteomyelitis now s/p incision & drainage, wound debridement and partial ray amputation of R 5th metatarsal 12/1 with concern for proximal extension of osteomyelitis. Vascular surgery consulted for evaluation and management.     PENDING DISCUSSION WITH ATTENDING.   Endocrinology following   Podiatry following   Remainder of care per primary team   Team 3c vascular surgery will **    Plan discussed with attending and chief resident.   ____________________________________________________   [place] - Resident   Surgery  46yoM with PMH IDDM, DKA, and L TMA 2/2 diabetic infections who presented to from [facility] DKA and treated with insulin gtt, now on sliding scale. Podiatry consulted for management of infected diabetic ulcer of R foot with imaging concerning for osteomyelitis now s/p incision & drainage, wound debridement and partial ray amputation of R 5th metatarsal 12/1 with concern for proximal extension of osteomyelitis. Vascular surgery consulted for evaluation and management.     Obtain arterial duplex of b/l lower extremities   Endocrinology following   Podiatry following   Remainder of care per primary team   Team 3c vascular surgery will continue to follow    Plan discussed with attending and chief resident.   ____________________________________________________  PRAVEEN Cisse - Resident   Surgery  46yoM with PMH IDDM, DKA, and L TMA 2/2 diabetic infections who presented to from [facility] DKA and treated with insulin gtt, now on sliding scale. Podiatry consulted for management of infected diabetic ulcer of R foot with imaging concerning for osteomyelitis now s/p incision & drainage, wound debridement and partial ray amputation of R 5th metatarsal 12/1 with concern for proximal extension of osteomyelitis. Vascular surgery consulted for evaluation and management.     Obtain arterial duplex of b/l lower extremities   Plan for further debridement by podiatry   Endocrinology following   Podiatry following   Remainder of care per primary team   Team 3c vascular surgery will continue to follow    Plan discussed with attending and chief resident.   ____________________________________________________   [place] - Resident   Surgery

## 2021-12-02 NOTE — PROGRESS NOTE ADULT - ASSESSMENT
47yo M PMH IDDM (diagnosed 11/2020) presented from [facility] complaining of chest pain. Reports polyuria, polydipsia and decreased appetite x1 week. Of note, pt was diagnosed w/ DM last year 11/2020 at which time he was admitted for DKA and all toes on his L foot were amputated.    Podiatry was consulted for the management of the right foot infected diabetic ulcer. Pt was noted to have gas on xray near the 5th metatarsal, although the xray read did not mention it. Right foot is malodorous and 5ccs of purulent drainage was expressed from the proximal aspect of the ulcer (dorsally over the 4th and 5th metatarsal). MICU was notified about the findings of gas on xray. Podiatry Attending discussed with MICU Attending about the importance of taking the patient to the OR immediately, however given his abn electrolyte (Potassium) and possibility of patient having arrythmia' s and increased glucose-- it was decided that we wait until patient has finished his drip and has labs have normalized. Re-evaluated in AM (12/1), pt vital signs have clinically improved. OR pushed back to evening due to emergent case in AM. CT confirms osteo of 5th MT.  Of note, pt is now s/p right partial 5th ray resection packed open.  Abscess was noted in the OR and was drained.     Of note, Examination today revealed wound on Left foot plantar aspect. +malodor. Xrays ordered for Left foot to r/o abscess/ gas.     Plan:  - Wound packed open with saline soaked gauze, dressed with DSD, ABD and ACE wrap.   - Wrapped Left foot with wet to dry and DSD   - Ordered Dakin's solution-- please leave at bedside  - Ordered 3 view Xray of the R foot  - [facility] vascular consult, as wound probed plantar proximally towards calcaneus, pt may require BKA  - f/u post op foot & ankle XR  - f/u OR Cx- presently growing GPC   - NWB to RLE  - c/w empiric ABX; pending final Cx and susceptibilities    Podiatry Following

## 2021-12-03 LAB
-  AMPICILLIN/SULBACTAM: SIGNIFICANT CHANGE UP
-  AMPICILLIN: SIGNIFICANT CHANGE UP
-  AMPICILLIN: SIGNIFICANT CHANGE UP
-  CEFAZOLIN: SIGNIFICANT CHANGE UP
-  CEFEPIME: SIGNIFICANT CHANGE UP
-  CEFTRIAXONE: SIGNIFICANT CHANGE UP
-  CIPROFLOXACIN: SIGNIFICANT CHANGE UP
-  CLINDAMYCIN: SIGNIFICANT CHANGE UP
-  ERTAPENEM: SIGNIFICANT CHANGE UP
-  ERYTHROMYCIN: SIGNIFICANT CHANGE UP
-  GENTAMICIN: SIGNIFICANT CHANGE UP
-  LEVOFLOXACIN: SIGNIFICANT CHANGE UP
-  PENICILLIN: SIGNIFICANT CHANGE UP
-  PIPERACILLIN/TAZOBACTAM: SIGNIFICANT CHANGE UP
-  TOBRAMYCIN: SIGNIFICANT CHANGE UP
-  TRIMETHOPRIM/SULFAMETHOXAZOLE: SIGNIFICANT CHANGE UP
-  VANCOMYCIN: SIGNIFICANT CHANGE UP
-  VANCOMYCIN: SIGNIFICANT CHANGE UP
ALBUMIN SERPL ELPH-MCNC: 2.5 G/DL — LOW (ref 3.3–5)
ALP SERPL-CCNC: 149 U/L — HIGH (ref 40–120)
ALT FLD-CCNC: <5 U/L — LOW (ref 10–45)
ANION GAP SERPL CALC-SCNC: 15 MMOL/L — SIGNIFICANT CHANGE UP (ref 5–17)
AST SERPL-CCNC: 8 U/L — LOW (ref 10–40)
BASOPHILS # BLD AUTO: 0.07 K/UL — SIGNIFICANT CHANGE UP (ref 0–0.2)
BASOPHILS NFR BLD AUTO: 0.7 % — SIGNIFICANT CHANGE UP (ref 0–2)
BILIRUB SERPL-MCNC: 0.3 MG/DL — SIGNIFICANT CHANGE UP (ref 0.2–1.2)
BUN SERPL-MCNC: 21 MG/DL — SIGNIFICANT CHANGE UP (ref 7–23)
CALCIUM SERPL-MCNC: 9.3 MG/DL — SIGNIFICANT CHANGE UP (ref 8.4–10.5)
CHLORIDE SERPL-SCNC: 96 MMOL/L — SIGNIFICANT CHANGE UP (ref 96–108)
CO2 SERPL-SCNC: 20 MMOL/L — LOW (ref 22–31)
CREAT SERPL-MCNC: 1.22 MG/DL — SIGNIFICANT CHANGE UP (ref 0.5–1.3)
CULTURE RESULTS: SIGNIFICANT CHANGE UP
EOSINOPHIL # BLD AUTO: 0.06 K/UL — SIGNIFICANT CHANGE UP (ref 0–0.5)
EOSINOPHIL NFR BLD AUTO: 0.6 % — SIGNIFICANT CHANGE UP (ref 0–6)
GLUCOSE BLDC GLUCOMTR-MCNC: 150 MG/DL — HIGH (ref 70–99)
GLUCOSE BLDC GLUCOMTR-MCNC: 182 MG/DL — HIGH (ref 70–99)
GLUCOSE BLDC GLUCOMTR-MCNC: 186 MG/DL — HIGH (ref 70–99)
GLUCOSE BLDC GLUCOMTR-MCNC: 215 MG/DL — HIGH (ref 70–99)
GLUCOSE BLDC GLUCOMTR-MCNC: 281 MG/DL — HIGH (ref 70–99)
GLUCOSE BLDC GLUCOMTR-MCNC: 308 MG/DL — HIGH (ref 70–99)
GLUCOSE SERPL-MCNC: 179 MG/DL — HIGH (ref 70–99)
HCT VFR BLD CALC: 27.8 % — LOW (ref 39–50)
HGB BLD-MCNC: 9.2 G/DL — LOW (ref 13–17)
IMM GRANULOCYTES NFR BLD AUTO: 10.6 % — HIGH (ref 0–1.5)
LYMPHOCYTES # BLD AUTO: 1.38 K/UL — SIGNIFICANT CHANGE UP (ref 1–3.3)
LYMPHOCYTES # BLD AUTO: 12.9 % — LOW (ref 13–44)
MAGNESIUM SERPL-MCNC: 1.9 MG/DL — SIGNIFICANT CHANGE UP (ref 1.6–2.6)
MCHC RBC-ENTMCNC: 28.6 PG — SIGNIFICANT CHANGE UP (ref 27–34)
MCHC RBC-ENTMCNC: 33.1 GM/DL — SIGNIFICANT CHANGE UP (ref 32–36)
MCV RBC AUTO: 86.3 FL — SIGNIFICANT CHANGE UP (ref 80–100)
METHOD TYPE: SIGNIFICANT CHANGE UP
MONOCYTES # BLD AUTO: 1.06 K/UL — HIGH (ref 0–0.9)
MONOCYTES NFR BLD AUTO: 9.9 % — SIGNIFICANT CHANGE UP (ref 2–14)
NEUTROPHILS # BLD AUTO: 7 K/UL — SIGNIFICANT CHANGE UP (ref 1.8–7.4)
NEUTROPHILS NFR BLD AUTO: 65.3 % — SIGNIFICANT CHANGE UP (ref 43–77)
NRBC # BLD: 0 /100 WBCS — SIGNIFICANT CHANGE UP (ref 0–0)
ORGANISM # SPEC MICROSCOPIC CNT: SIGNIFICANT CHANGE UP
PLATELET # BLD AUTO: 448 K/UL — HIGH (ref 150–400)
POTASSIUM SERPL-MCNC: 3.6 MMOL/L — SIGNIFICANT CHANGE UP (ref 3.5–5.3)
POTASSIUM SERPL-SCNC: 3.6 MMOL/L — SIGNIFICANT CHANGE UP (ref 3.5–5.3)
PROT SERPL-MCNC: 7.5 G/DL — SIGNIFICANT CHANGE UP (ref 6–8.3)
RBC # BLD: 3.22 M/UL — LOW (ref 4.2–5.8)
RBC # FLD: 13.4 % — SIGNIFICANT CHANGE UP (ref 10.3–14.5)
SODIUM SERPL-SCNC: 131 MMOL/L — LOW (ref 135–145)
SPECIMEN SOURCE: SIGNIFICANT CHANGE UP
WBC # BLD: 10.7 K/UL — HIGH (ref 3.8–10.5)
WBC # FLD AUTO: 10.7 K/UL — HIGH (ref 3.8–10.5)

## 2021-12-03 PROCEDURE — 99233 SBSQ HOSP IP/OBS HIGH 50: CPT | Mod: GC

## 2021-12-03 PROCEDURE — 99231 SBSQ HOSP IP/OBS SF/LOW 25: CPT | Mod: GC

## 2021-12-03 PROCEDURE — 99232 SBSQ HOSP IP/OBS MODERATE 35: CPT

## 2021-12-03 RX ORDER — SODIUM CHLORIDE 9 MG/ML
1000 INJECTION INTRAMUSCULAR; INTRAVENOUS; SUBCUTANEOUS
Refills: 0 | Status: DISCONTINUED | OUTPATIENT
Start: 2021-12-03 | End: 2021-12-04

## 2021-12-03 RX ORDER — POTASSIUM CHLORIDE 20 MEQ
40 PACKET (EA) ORAL ONCE
Refills: 0 | Status: COMPLETED | OUTPATIENT
Start: 2021-12-03 | End: 2021-12-05

## 2021-12-03 RX ORDER — INSULIN LISPRO 100/ML
16 VIAL (ML) SUBCUTANEOUS
Refills: 0 | Status: DISCONTINUED | OUTPATIENT
Start: 2021-12-03 | End: 2021-12-05

## 2021-12-03 RX ADMIN — Medication 5 MILLIGRAM(S): at 12:57

## 2021-12-03 RX ADMIN — PANTOPRAZOLE SODIUM 40 MILLIGRAM(S): 20 TABLET, DELAYED RELEASE ORAL at 06:53

## 2021-12-03 RX ADMIN — INSULIN HUMAN 2: 100 INJECTION, SOLUTION SUBCUTANEOUS at 07:29

## 2021-12-03 RX ADMIN — HEPARIN SODIUM 5000 UNIT(S): 5000 INJECTION INTRAVENOUS; SUBCUTANEOUS at 22:16

## 2021-12-03 RX ADMIN — Medication 16 UNIT(S): at 17:05

## 2021-12-03 RX ADMIN — Medication 12 UNIT(S): at 13:01

## 2021-12-03 RX ADMIN — INSULIN GLARGINE 50 UNIT(S): 100 INJECTION, SOLUTION SUBCUTANEOUS at 22:16

## 2021-12-03 RX ADMIN — Medication 5 MILLIGRAM(S): at 22:16

## 2021-12-03 RX ADMIN — PANTOPRAZOLE SODIUM 40 MILLIGRAM(S): 20 TABLET, DELAYED RELEASE ORAL at 17:03

## 2021-12-03 RX ADMIN — Medication 12 UNIT(S): at 09:07

## 2021-12-03 RX ADMIN — INSULIN HUMAN 4: 100 INJECTION, SOLUTION SUBCUTANEOUS at 13:08

## 2021-12-03 RX ADMIN — CEFTRIAXONE 100 MILLIGRAM(S): 500 INJECTION, POWDER, FOR SOLUTION INTRAMUSCULAR; INTRAVENOUS at 17:03

## 2021-12-03 RX ADMIN — Medication 5 MILLIGRAM(S): at 06:53

## 2021-12-03 RX ADMIN — INSULIN HUMAN 2: 100 INJECTION, SOLUTION SUBCUTANEOUS at 00:44

## 2021-12-03 RX ADMIN — HEPARIN SODIUM 5000 UNIT(S): 5000 INJECTION INTRAVENOUS; SUBCUTANEOUS at 12:57

## 2021-12-03 RX ADMIN — HEPARIN SODIUM 5000 UNIT(S): 5000 INJECTION INTRAVENOUS; SUBCUTANEOUS at 06:54

## 2021-12-03 RX ADMIN — SODIUM CHLORIDE 50 MILLILITER(S): 9 INJECTION INTRAMUSCULAR; INTRAVENOUS; SUBCUTANEOUS at 17:03

## 2021-12-03 RX ADMIN — ATORVASTATIN CALCIUM 20 MILLIGRAM(S): 80 TABLET, FILM COATED ORAL at 22:16

## 2021-12-03 NOTE — PROGRESS NOTE ADULT - PROBLEM SELECTOR PLAN 3
non-anion gap metabolic acidosis, compensated. likely 2/2 to resolving DKA.  ABG w/ pCO2 24, metabolic acidosis appropriately compensated according to Winter's formula  - currently satting well on RA  -AG wnl

## 2021-12-03 NOTE — PROGRESS NOTE ADULT - SUBJECTIVE AND OBJECTIVE BOX
INTERVAL HPI/OVERNIGHT EVENTS: MISSY.     CONSTITUTIONAL:  Negative fever or chills, feels well, good appetite  EYES:  Negative  blurry vision or double vision  CARDIOVASCULAR:  Negative for chest pain or palpitations  RESPIRATORY:  Negative for cough, wheezing, or SOB   GASTROINTESTINAL:  Negative for nausea, vomiting, diarrhea, constipation, or abdominal pain  GENITOURINARY:  Negative frequency, urgency or dysuria  NEUROLOGIC:  No headache, confusion, dizziness, lightheadedness      ANTIBIOTICS/RELEVANT:    MEDICATIONS  (STANDING):  atorvastatin 20 milliGRAM(s) Oral at bedtime  baclofen 5 milliGRAM(s) Oral three times a day  cefTRIAXone   IVPB 2000 milliGRAM(s) IV Intermittent every 24 hours  Dakins Solution - 1/4 Strength 1 Application(s) Topical daily  dextrose 40% Gel 15 Gram(s) Oral once  dextrose 5%. 1000 milliLiter(s) (100 mL/Hr) IV Continuous <Continuous>  dextrose 50% Injectable 50 milliLiter(s) IV Push every 15 minutes  glucagon  Injectable 1 milliGRAM(s) IntraMuscular once  heparin   Injectable 5000 Unit(s) SubCutaneous every 8 hours  insulin glargine Injectable (LANTUS) 50 Unit(s) SubCutaneous at bedtime  insulin lispro Injectable (ADMELOG) 12 Unit(s) SubCutaneous three times a day before meals  insulin regular  human corrective regimen sliding scale   SubCutaneous every 6 hours  pantoprazole    Tablet 40 milliGRAM(s) Oral two times a day  potassium chloride    Tablet ER 40 milliEquivalent(s) Oral once  sodium chloride 0.9%. 1000 milliLiter(s) (80 mL/Hr) IV Continuous <Continuous>    MEDICATIONS  (PRN):  acetaminophen     Tablet .. 650 milliGRAM(s) Oral every 6 hours PRN Temp greater or equal to 38C (100.4F), Mild Pain (1 - 3)  calcium carbonate    500 mG (Tums) Chewable 1 Tablet(s) Chew three times a day PRN Dyspepsia  oxyCODONE    IR 5 milliGRAM(s) Oral every 6 hours PRN Moderate Pain (4 - 6)  simethicone 80 milliGRAM(s) Chew two times a day PRN Heartburn        Vital Signs Last 24 Hrs  T(C): 36.9 (03 Dec 2021 09:10), Max: 36.9 (03 Dec 2021 05:33)  T(F): 98.5 (03 Dec 2021 09:10), Max: 98.5 (03 Dec 2021 05:33)  HR: 96 (03 Dec 2021 09:10) (87 - 100)  BP: 105/60 (03 Dec 2021 09:10) (105/60 - 120/69)  BP(mean): --  RR: 18 (03 Dec 2021 09:10) (18 - 20)  SpO2: 96% (03 Dec 2021 09:10) (96% - 97%)    PHYSICAL EXAM:  Constitutional:Well-developed, well nourished  Eyes:LUIS DANIEL, EOMI  Ear/Nose/Throat: no oral lesion, no sinus tenderness on percussion	  Neck:no JVD, no lymphadenopathy, supple  Respiratory: CTA dunia  Cardiovascular: S1S2 RRR, no murmurs  Gastrointestinal:soft, (+) BS, no HSM  Extremities:no e/e/c  Vascular: DP Pulse:	right normal; left normal      LABS:                        9.2    10.70 )-----------( 448      ( 03 Dec 2021 06:58 )             27.8     12-03    131<L>  |  96  |  21  ----------------------------<  179<H>  3.6   |  20<L>  |  1.22    Ca    9.3      03 Dec 2021 06:58  Phos  2.5     12-02  Mg     1.9     12-03    TPro  7.5  /  Alb  2.5<L>  /  TBili  0.3  /  DBili  x   /  AST  8<L>  /  ALT  <5<L>  /  AlkPhos  149<H>  12-03          MICROBIOLOGY: reviewed    RADIOLOGY & ADDITIONAL STUDIES: reviewed

## 2021-12-03 NOTE — PROGRESS NOTE ADULT - ASSESSMENT
47 yo male with uncontrolled DM A1c 14.3, hx L toe amputations/skin graft 2020, now p/w DKA in setting of R DFI/5th MT osteomyelitis s/p partial 5th Ray amputation (+purulence and malodor in OR) with cultures growing GBS as well as rare Enterococcus and Proteus; suspect GBS to be the primary pathogen here. ?pending L foot washout by podiatry; ?further R foot surgical intervention.  - continue ceftriaxone 2g IV q24h    ID Team 2

## 2021-12-03 NOTE — PROGRESS NOTE ADULT - ATTENDING COMMENTS
Pt seen on rounds this afternoon.  Was OOB in a chair.  Both feet Ace-wrapped.  Denied any pain in either foot  Was extremely animated  Fingersticks still somewhat elevated in the 180-215 range  Will increase the Lantus to 50 units, premeal to 16 units--of interest is that this is close to the outpatient regimen which had been previously prescribed

## 2021-12-03 NOTE — PROGRESS NOTE ADULT - SUBJECTIVE AND OBJECTIVE BOX
HX: R 5th metatarsal ray amputation 2/2 osteomyelitis 12/1     SUBJECTIVE: Patient seen and examined bedside. Denies CP, SOB, fevers, chills. Endorses some nausea without emesis and decreased appetite. Denies pain and drainage of b/l LE.     cefTRIAXone   IVPB 2000 milliGRAM(s) IV Intermittent every 24 hours  heparin   Injectable 5000 Unit(s) SubCutaneous every 8 hours    MEDICATIONS  (PRN):  acetaminophen     Tablet .. 650 milliGRAM(s) Oral every 6 hours PRN Temp greater or equal to 38C (100.4F), Mild Pain (1 - 3)  calcium carbonate    500 mG (Tums) Chewable 1 Tablet(s) Chew three times a day PRN Dyspepsia  oxyCODONE    IR 5 milliGRAM(s) Oral every 6 hours PRN Moderate Pain (4 - 6)  simethicone 80 milliGRAM(s) Chew two times a day PRN Heartburn      I&O's Detail    02 Dec 2021 07:01  -  03 Dec 2021 07:00  --------------------------------------------------------  IN:    IV PiggyBack: 450 mL    IV PiggyBack: 62.5 mL    sodium chloride 0.9%: 400 mL  Total IN: 912.5 mL    OUT:  Total OUT: 0 mL    Total NET: 912.5 mL          Vital Signs Last 24 Hrs  T(C): 36.9 (03 Dec 2021 05:33), Max: 36.9 (03 Dec 2021 05:33)  T(F): 98.5 (03 Dec 2021 05:33), Max: 98.5 (03 Dec 2021 05:33)  HR: 100 (03 Dec 2021 05:33) (80 - 100)  BP: 112/70 (03 Dec 2021 05:33) (101/68 - 124/73)  BP(mean): 86 (02 Dec 2021 09:57) (79 - 86)  RR: 19 (03 Dec 2021 05:33) (18 - 20)  SpO2: 97% (03 Dec 2021 05:33) (97% - 98%)    PHYSICAL EXAM:   Gen: NAD, resting comfortably in bed, conversational   CV: NSR  Pulm: no respiratory distress on RA, speaks in full sentences   Abd: soft, ND, NTTP, no rebound or guarding   Ext: RLE: palpable femoral pulse, palpable pop, biphasic DP, biphasic PT, no edema, distal extremity/toes slightly cool to touch, necrotic tissue with minimal bleeding at amputation site, large cavitation on dorsal aspect of lateral foot that probes deeply, LLE: palpable femoral pulse, palpable pop, biphasic DP, biphasic PT, no edema, distal extremity TMA site covered with eschar, large dorsolateral ulcer with minimal bleeding   Neuro: motor grossly intact, sensory diminished b/l      LABS:                        9.2    10.70 )-----------( 448      ( 03 Dec 2021 06:58 )             27.8     12-03    131<L>  |  96  |  x   ----------------------------<  179<H>  3.6   |  20<L>  |  1.22    Ca    9.3      03 Dec 2021 06:58  Phos  2.5     12-02  Mg     1.9     12-03    TPro  7.5  /  Alb  2.5<L>  /  TBili  0.3  /  DBili  x   /  AST  8<L>  /  ALT  <5<L>  /  AlkPhos  149<H>  12-03    LIVER FUNCTIONS - ( 03 Dec 2021 06:58 )  Alb: 2.5 g/dL / Pro: 7.5 g/dL / ALK PHOS: 149 U/L / ALT: <5 U/L / AST: 8 U/L / GGT: x             CAPILLARY BLOOD GLUCOSE      POCT Blood Glucose.: 186 mg/dL (03 Dec 2021 07:06)  POCT Blood Glucose.: 191 mg/dL (02 Dec 2021 23:52)  POCT Blood Glucose.: 219 mg/dL (02 Dec 2021 22:55)  POCT Blood Glucose.: 188 mg/dL (02 Dec 2021 19:13)  POCT Blood Glucose.: 182 mg/dL (02 Dec 2021 18:41)  POCT Blood Glucose.: 216 mg/dL (02 Dec 2021 12:15)  POCT Blood Glucose.: 326 mg/dL (02 Dec 2021 08:50)            Culture - Surgical Swab (collected 01 Dec 2021 19:48)  Source: .Surgical Swab deep wound right foot  Gram Stain (01 Dec 2021 21:54):    Moderate Gram Positive Cocci in Pairs and Chains    Moderate White blood cells  Preliminary Report (02 Dec 2021 11:25):    Moderate Streptococcus agalactiae (Group B)    Rare to few Enterococcus faecalis    Rare to few Proteus mirabilis    Susceptibility to follow.    Culture in progress    Culture - Other (collected 01 Dec 2021 01:01)  Source: .Other wound  Final Report (01 Dec 2021 22:31):    Culture yields growth of greater than 3 colony types of    Call client services within 7 days if further workup is clinically    indicated.    Culture includes    Numerous Streptococcus agalactiae (Group B) isolated    Group B streptococci are susceptible toampicillin,    penicillin and cefazolin, but may be resistant to    erythromycin and clindamycin.    Recommendations for intrapartum prophylaxis for Group B    streptococci are penicillin or ampicillin.    Culture - Blood (collected 01 Dec 2021 00:58)  Source: .Blood Blood-Venous  Preliminary Report (02 Dec 2021 01:02):    No growth to date.    Culture - Blood (collected 01 Dec 2021 00:58)  Source: .Blood Blood-Venous  Preliminary Report (02 Dec 2021 01:02):    No growth to date.    Culture - Urine (collected 01 Dec 2021 00:54)  Source: Clean Catch Clean Catch (Midstream)  Final Report (01 Dec 2021 22:36):    <10,000 CFU/mL Normal Urogenital Joanna           RADIOLOGY & ADDITIONAL STUDIES:  < from: [facility] Duplex Lower Extrem Arterial, Bilat (12.02.21 @ 17:57) >    EXAM:  US DPLX LWR EXT ARTS COMPL BI                          PROCEDURE DATE:  12/02/2021          INTERPRETATION:  BILATERAL LOWER EXTREMITY ARTERIAL DUPLEX DOPPLER ULTRASOUND Performed 12/2/2021 5:57 PM    INDICATION: 46 years-old Male with ulcerations, DKA.    TECHNIQUE:  Duplex Doppler evaluation including gray-scale ultrasound imaging, color flow Doppler imaging, and Doppler spectral analysis of the right and left lower extremity was performed.    PRIOR STUDIES:  None.    FINDINGS:    RIGHT LOWER EXTREMITY:    Right common femoral artery: Mild noncalcified plaque. Triphasic waveform. PSV: 96.4 cm/sec.    Right profunda femoris: Moderate stenosis secondary to mixed calcified and noncalcified plaque. Triphasic waveform. PSV: 118 cm/sec.    Right superficial femoral artery, proximal: No significant stenosis. Triphasic waveform. PSV: 138 cm/sec.    Right superficial femoral artery, mid: No significant stenosis. Triphasic waveform. PSV: 139 cm/sec.    Right superficial femoral artery, distal: No significant stenosis. Weakly biphasic waveform. PSV: 129 cm/sec.    Right popliteal artery: No significant stenosis. Biphasic waveform. PSV: 80.7 cm/sec.    Right peroneal artery: No significant stenosis. Monophasic waveform with spectral broadening. PSV: 64.5 cm/sec.    Right posterior tibial artery: No significant stenosis. Biphasic waveform. PSV: 121 cm/sec.    Right anterior tibial artery: No significant stenosis. Triphasic waveform. PSV: 108 cm/sec.    Right dorsalis pedis artery: Not visualized secondary to overlying bandages.      LEFT LOWER EXTREMITY:    Left common femoral artery: No significant stenosis. Triphasic waveform. PSV: 112 cm/sec.    Left profunda femoris: No significant stenosis. Weakly triphasic waveform. PSV: 84.9 cm/sec.    Left superficial femoral artery, proximal: No significant stenosis. Triphasic waveform. PSV: 115 cm/sec.    Left superficial femoral artery, mid: No significant stenosis. Triphasic waveform. PSV: 98.8 cm/sec.    Left superficial femoralartery, distal: No significant stenosis. Triphasic waveform. PSV: 136 cm/sec.    Left popliteal artery: No significant stenosis. Weakly triphasic waveform. PSV: 83.2 cm/sec.    Left peroneal artery: No significant stenosis. Weakly triphasic waveform.PSV: 92.7 cm/sec.    Left posterior tibial artery: No significant stenosis. Triphasic waveform. PSV: 99.6 cm/sec.    Left anterior tibial artery: No significant stenosis. Triphasic waveform. PSV: 125 cm/sec.    Left dorsalis pedis artery: Not visualized secondary to overlying bandages.      ALSO NOTED:  Scattered areas of mild to moderate soft plaque involving the bilateral common femoral arteries and bilateral femoral arteries. Enlarged 3.2 x 1.1 x 2.9 cm right inguinal node.    IMPRESSION:  1.No hemodynamically significant stenosis in bilateral lower extremity arteries. Mild atherosclerotic peripheral artery disease.  2.  Enlarged right inguinal node, likely reactive.    --- End of Report ---          Thank you for the opportunity to participate in the care of this patient.    KENNETH PRITCHETT MD; Resident Radiologist  This document has been electronically signed.  OMARI GORDILLO MD; Attending Radiologist  This document has been electronically signed. Dec  2 2021  8:26PM    < end of copied text >  < from: Xray Foot AP + Lateral + Oblique, Left (12.02.21 @ 08:52) >    EXAM:  XR FOOT COMP MIN 3 VIEWS LT                          PROCEDURE DATE:  12/02/2021          INTERPRETATION:  INDICATION: Left foot amputation, infection    TECHNIQUE: 3 views of the left foot    COMPARISON: None available    FINDINGS:    Patient is status post transmetatarsal amputation left foot with unremarkable appearance of the stump margins and no evidence for acute osteomyelitis.  There is no focal soft tissue abnormality.      IMPRESSION:    No radiographic evidence for acute osteomyelitis.    --- End of Report ---          Thank you for the opportunity to participate in the care of this patient.      ELINA JOHNSON MD; Attending Radiologist  This document has been electronically signed. Dec  2 2021  4:08PM    < end of copied text >  < from: Xray Foot AP + Lateral + Oblique, Right (12.01.21 @ 19:38) >    EXAM:  XR FOOT COMP MIN 3 VIEWS RT                          EXAM:  XR ANKLE COMP MIN 3 VIEWS RT                          PROCEDURE DATE:  12/01/2021          INTERPRETATION:  INDICATION: Right foot infection    TECHNIQUE: 3 views of the right foot and ankle    COMPARISON: 11/30/2021    FINDINGS:    There has been interval amputation of the fifth digit at the level of the mid metatarsal shaft. Unremarkable appearance of the stump margin. Degenerative changes of the ankle are noted.      IMPRESSION:    There is amputation of the fifth digit at the level of the mid fifth metatarsal shaft.    --- End of Report ---          Thank you for the opportunity to participate in the care of this patient.      ELINA JOHNSON MD; Attending Radiologist  This document has been electronically signed. Dec  2 2021  4:01PM    < end of copied text >  < from: Xray Ankle Complete 3 Views, Right (12.01.21 @ 19:37) >    EXAM:  XR FOOT COMP MIN 3 VIEWS RT                          EXAM:  XR ANKLE COMP MIN 3 VIEWS RT                          PROCEDURE DATE:  12/01/2021          INTERPRETATION:  INDICATION: Right foot infection    TECHNIQUE: 3 views of the right foot and ankle    COMPARISON: 11/30/2021    FINDINGS:    There has been interval amputation of the fifth digit at the level of the mid metatarsal shaft. Unremarkable appearance of the stump margin. Degenerative changes of the ankle are noted.      IMPRESSION:    There is amputation of the fifth digit at the level of the mid fifth metatarsal shaft.    --- End of Report ---          Thank you for the opportunity to participate in the care of this patient.      ELINA DOMINGUEZ MBA-DANIEL ISAACS; Attending Radiologist  This document has been electronically signed. Dec  2 2021  4:01PM    < end of copied text >     HX: R 5th metatarsal ray amputation 2/2 osteomyelitis 12/1     SUBJECTIVE: Patient seen and examined bedside. Denies CP, SOB, fevers, chills. Endorses some nausea without emesis and decreased appetite. Denies pain and drainage of b/l LE.     cefTRIAXone   IVPB 2000 milliGRAM(s) IV Intermittent every 24 hours  heparin   Injectable 5000 Unit(s) SubCutaneous every 8 hours    MEDICATIONS  (PRN):  acetaminophen     Tablet .. 650 milliGRAM(s) Oral every 6 hours PRN Temp greater or equal to 38C (100.4F), Mild Pain (1 - 3)  calcium carbonate    500 mG (Tums) Chewable 1 Tablet(s) Chew three times a day PRN Dyspepsia  oxyCODONE    IR 5 milliGRAM(s) Oral every 6 hours PRN Moderate Pain (4 - 6)  simethicone 80 milliGRAM(s) Chew two times a day PRN Heartburn      I&O's Detail    02 Dec 2021 07:01  -  03 Dec 2021 07:00  --------------------------------------------------------  IN:    IV PiggyBack: 450 mL    IV PiggyBack: 62.5 mL    sodium chloride 0.9%: 400 mL  Total IN: 912.5 mL    OUT:  Total OUT: 0 mL    Total NET: 912.5 mL          Vital Signs Last 24 Hrs  T(C): 36.9 (03 Dec 2021 05:33), Max: 36.9 (03 Dec 2021 05:33)  T(F): 98.5 (03 Dec 2021 05:33), Max: 98.5 (03 Dec 2021 05:33)  HR: 100 (03 Dec 2021 05:33) (80 - 100)  BP: 112/70 (03 Dec 2021 05:33) (101/68 - 124/73)  BP(mean): 86 (02 Dec 2021 09:57) (79 - 86)  RR: 19 (03 Dec 2021 05:33) (18 - 20)  SpO2: 97% (03 Dec 2021 05:33) (97% - 98%)    PHYSICAL EXAM:   Gen: NAD, resting comfortably in bed, conversational   CV: NSR  Pulm: no respiratory distress on RA, speaks in full sentences   Abd: soft, ND, NTTP, no rebound or guarding   Ext: RLE: palpable femoral pulse, palpable pop, no edema, distal extremity/toes slightly cool to touch, necrotic tissue with minimal bleeding at amputation site, large cavitation on dorsal aspect of lateral foot that probes deeply, LLE: palpable femoral pulse, palpable pop, no edema, distal extremity TMA site covered with eschar, large dorsolateral ulcer with minimal bleeding   Neuro: motor grossly intact, sensory diminished b/l      LABS:                        9.2    10.70 )-----------( 448      ( 03 Dec 2021 06:58 )             27.8     12-03    131<L>  |  96  |  x   ----------------------------<  179<H>  3.6   |  20<L>  |  1.22    Ca    9.3      03 Dec 2021 06:58  Phos  2.5     12-02  Mg     1.9     12-03    TPro  7.5  /  Alb  2.5<L>  /  TBili  0.3  /  DBili  x   /  AST  8<L>  /  ALT  <5<L>  /  AlkPhos  149<H>  12-03    LIVER FUNCTIONS - ( 03 Dec 2021 06:58 )  Alb: 2.5 g/dL / Pro: 7.5 g/dL / ALK PHOS: 149 U/L / ALT: <5 U/L / AST: 8 U/L / GGT: x             CAPILLARY BLOOD GLUCOSE      POCT Blood Glucose.: 186 mg/dL (03 Dec 2021 07:06)  POCT Blood Glucose.: 191 mg/dL (02 Dec 2021 23:52)  POCT Blood Glucose.: 219 mg/dL (02 Dec 2021 22:55)  POCT Blood Glucose.: 188 mg/dL (02 Dec 2021 19:13)  POCT Blood Glucose.: 182 mg/dL (02 Dec 2021 18:41)  POCT Blood Glucose.: 216 mg/dL (02 Dec 2021 12:15)  POCT Blood Glucose.: 326 mg/dL (02 Dec 2021 08:50)            Culture - Surgical Swab (collected 01 Dec 2021 19:48)  Source: .Surgical Swab deep wound right foot  Gram Stain (01 Dec 2021 21:54):    Moderate Gram Positive Cocci in Pairs and Chains    Moderate White blood cells  Preliminary Report (02 Dec 2021 11:25):    Moderate Streptococcus agalactiae (Group B)    Rare to few Enterococcus faecalis    Rare to few Proteus mirabilis    Susceptibility to follow.    Culture in progress    Culture - Other (collected 01 Dec 2021 01:01)  Source: .Other wound  Final Report (01 Dec 2021 22:31):    Culture yields growth of greater than 3 colony types of    Call client services within 7 days if further workup is clinically    indicated.    Culture includes    Numerous Streptococcus agalactiae (Group B) isolated    Group B streptococci are susceptible toampicillin,    penicillin and cefazolin, but may be resistant to    erythromycin and clindamycin.    Recommendations for intrapartum prophylaxis for Group B    streptococci are penicillin or ampicillin.    Culture - Blood (collected 01 Dec 2021 00:58)  Source: .Blood Blood-Venous  Preliminary Report (02 Dec 2021 01:02):    No growth to date.    Culture - Blood (collected 01 Dec 2021 00:58)  Source: .Blood Blood-Venous  Preliminary Report (02 Dec 2021 01:02):    No growth to date.    Culture - Urine (collected 01 Dec 2021 00:54)  Source: Clean Catch Clean Catch (Midstream)  Final Report (01 Dec 2021 22:36):    <10,000 CFU/mL Normal Urogenital Joanna           RADIOLOGY & ADDITIONAL STUDIES:  < from: [facility] Duplex Lower Extrem Arterial, Bilat (12.02.21 @ 17:57) >    EXAM:  US DPLX LWR EXT ARTS COMPL BI                          PROCEDURE DATE:  12/02/2021          INTERPRETATION:  BILATERAL LOWER EXTREMITY ARTERIAL DUPLEX DOPPLER ULTRASOUND Performed 12/2/2021 5:57 PM    INDICATION: 46 years-old Male with ulcerations, DKA.    TECHNIQUE:  Duplex Doppler evaluation including gray-scale ultrasound imaging, color flow Doppler imaging, and Doppler spectral analysis of the right and left lower extremity was performed.    PRIOR STUDIES:  None.    FINDINGS:    RIGHT LOWER EXTREMITY:    Right common femoral artery: Mild noncalcified plaque. Triphasic waveform. PSV: 96.4 cm/sec.    Right profunda femoris: Moderate stenosis secondary to mixed calcified and noncalcified plaque. Triphasic waveform. PSV: 118 cm/sec.    Right superficial femoral artery, proximal: No significant stenosis. Triphasic waveform. PSV: 138 cm/sec.    Right superficial femoral artery, mid: No significant stenosis. Triphasic waveform. PSV: 139 cm/sec.    Right superficial femoral artery, distal: No significant stenosis. Weakly biphasic waveform. PSV: 129 cm/sec.    Right popliteal artery: No significant stenosis. Biphasic waveform. PSV: 80.7 cm/sec.    Right peroneal artery: No significant stenosis. Monophasic waveform with spectral broadening. PSV: 64.5 cm/sec.    Right posterior tibial artery: No significant stenosis. Biphasic waveform. PSV: 121 cm/sec.    Right anterior tibial artery: No significant stenosis. Triphasic waveform. PSV: 108 cm/sec.    Right dorsalis pedis artery: Not visualized secondary to overlying bandages.      LEFT LOWER EXTREMITY:    Left common femoral artery: No significant stenosis. Triphasic waveform. PSV: 112 cm/sec.    Left profunda femoris: No significant stenosis. Weakly triphasic waveform. PSV: 84.9 cm/sec.    Left superficial femoral artery, proximal: No significant stenosis. Triphasic waveform. PSV: 115 cm/sec.    Left superficial femoral artery, mid: No significant stenosis. Triphasic waveform. PSV: 98.8 cm/sec.    Left superficial femoralartery, distal: No significant stenosis. Triphasic waveform. PSV: 136 cm/sec.    Left popliteal artery: No significant stenosis. Weakly triphasic waveform. PSV: 83.2 cm/sec.    Left peroneal artery: No significant stenosis. Weakly triphasic waveform.PSV: 92.7 cm/sec.    Left posterior tibial artery: No significant stenosis. Triphasic waveform. PSV: 99.6 cm/sec.    Left anterior tibial artery: No significant stenosis. Triphasic waveform. PSV: 125 cm/sec.    Left dorsalis pedis artery: Not visualized secondary to overlying bandages.      ALSO NOTED:  Scattered areas of mild to moderate soft plaque involving the bilateral common femoral arteries and bilateral femoral arteries. Enlarged 3.2 x 1.1 x 2.9 cm right inguinal node.    IMPRESSION:  1.No hemodynamically significant stenosis in bilateral lower extremity arteries. Mild atherosclerotic peripheral artery disease.  2.  Enlarged right inguinal node, likely reactive.    --- End of Report ---          Thank you for the opportunity to participate in the care of this patient.    KENNETH PRITCHETT MD; Resident Radiologist  This document has been electronically signed.  OMARI GORDILLO MD; Attending Radiologist  This document has been electronically signed. Dec  2 2021  8:26PM    < end of copied text >  < from: Xray Foot AP + Lateral + Oblique, Left (12.02.21 @ 08:52) >    EXAM:  XR FOOT COMP MIN 3 VIEWS LT                          PROCEDURE DATE:  12/02/2021          INTERPRETATION:  INDICATION: Left foot amputation, infection    TECHNIQUE: 3 views of the left foot    COMPARISON: None available    FINDINGS:    Patient is status post transmetatarsal amputation left foot with unremarkable appearance of the stump margins and no evidence for acute osteomyelitis.  There is no focal soft tissue abnormality.      IMPRESSION:    No radiographic evidence for acute osteomyelitis.    --- End of Report ---          Thank you for the opportunity to participate in the care of this patient.      ELINA JOHNSON MD; Attending Radiologist  This document has been electronically signed. Dec  2 2021  4:08PM    < end of copied text >  < from: Xray Foot AP + Lateral + Oblique, Right (12.01.21 @ 19:38) >    EXAM:  XR FOOT COMP MIN 3 VIEWS RT                          EXAM:  XR ANKLE COMP MIN 3 VIEWS RT                          PROCEDURE DATE:  12/01/2021          INTERPRETATION:  INDICATION: Right foot infection    TECHNIQUE: 3 views of the right foot and ankle    COMPARISON: 11/30/2021    FINDINGS:    There has been interval amputation of the fifth digit at the level of the mid metatarsal shaft. Unremarkable appearance of the stump margin. Degenerative changes of the ankle are noted.      IMPRESSION:    There is amputation of the fifth digit at the level of the mid fifth metatarsal shaft.    --- End of Report ---          Thank you for the opportunity to participate in the care of this patient.      ELINA JOHNSON MD; Attending Radiologist  This document has been electronically signed. Dec  2 2021  4:01PM    < end of copied text >  < from: Xray Ankle Complete 3 Views, Right (12.01.21 @ 19:37) >    EXAM:  XR FOOT COMP MIN 3 VIEWS RT                          EXAM:  XR ANKLE COMP MIN 3 VIEWS RT                          PROCEDURE DATE:  12/01/2021          INTERPRETATION:  INDICATION: Right foot infection    TECHNIQUE: 3 views of the right foot and ankle    COMPARISON: 11/30/2021    FINDINGS:    There has been interval amputation of the fifth digit at the level of the mid metatarsal shaft. Unremarkable appearance of the stump margin. Degenerative changes of the ankle are noted.      IMPRESSION:    There is amputation of the fifth digit at the level of the mid fifth metatarsal shaft.    --- End of Report ---          Thank you for the opportunity to participate in the care of this patient.      ELINA DOMINGUEZ MBA-DANIEL ISAACS; Attending Radiologist  This document has been electronically signed. Dec  2 2021  4:01PM    < end of copied text >

## 2021-12-03 NOTE — PROGRESS NOTE ADULT - ATTENDING COMMENTS
Pt. seen and examined by me earlier today; I have read Dr. Prakash's note, I agree w/ her findings and plan of care as documented; cont. work-up and mgmt per Podiatry, Vascular, ID, and Endocrine

## 2021-12-03 NOTE — PROGRESS NOTE ADULT - SUBJECTIVE AND OBJECTIVE BOX
OVERNIGHT EVENTS: NAEO    SUBJECTIVE / INTERVAL HPI: Patient seen and examined at bedside. Reports feeling well, no chest pain overnight. Still hiccuping. Reports pain in feet last night but well-controlled currently. No other complaints.   Remaining ROS negative       PHYSICAL EXAM:    General: sitting up in bed, NAD   HEENT: NC/AT; PERRL, anicteric sclera; MMM  Neck: supple, no JVD   Cardiovascular: +S1/S2, RRR, no murmurs   Respiratory: CTA B/L; no W/R/R. no increased WOB.   Gastrointestinal: soft, NT/ND; +BSx4  Extremities: WWP; no edema, clubbing or cyanosis. L foot s/p toes amputated, wrapped in gauze. RLE wrapped in gauze and ACE.   Vascular: 2+ radial pulses B/L  Neurological: AAOx3; decreased sensation in b/l LEs. 5/5 muscle strength.  Dermatologic: no appreciable wounds or damage to the skin. dry flaking skin on b/l shins.    VITAL SIGNS:  Vital Signs Last 24 Hrs  T(C): 36.9 (03 Dec 2021 09:10), Max: 36.9 (03 Dec 2021 05:33)  T(F): 98.5 (03 Dec 2021 09:10), Max: 98.5 (03 Dec 2021 05:33)  HR: 96 (03 Dec 2021 09:10) (87 - 100)  BP: 105/60 (03 Dec 2021 09:10) (105/60 - 120/69)  BP(mean): --  RR: 18 (03 Dec 2021 09:10) (18 - 20)  SpO2: 96% (03 Dec 2021 09:10) (96% - 97%)      MEDICATIONS:  MEDICATIONS  (STANDING):  atorvastatin 20 milliGRAM(s) Oral at bedtime  baclofen 5 milliGRAM(s) Oral three times a day  cefTRIAXone   IVPB 2000 milliGRAM(s) IV Intermittent every 24 hours  Dakins Solution - 1/4 Strength 1 Application(s) Topical daily  dextrose 40% Gel 15 Gram(s) Oral once  dextrose 5%. 1000 milliLiter(s) (100 mL/Hr) IV Continuous <Continuous>  dextrose 50% Injectable 50 milliLiter(s) IV Push every 15 minutes  glucagon  Injectable 1 milliGRAM(s) IntraMuscular once  heparin   Injectable 5000 Unit(s) SubCutaneous every 8 hours  insulin glargine Injectable (LANTUS) 50 Unit(s) SubCutaneous at bedtime  insulin lispro Injectable (ADMELOG) 12 Unit(s) SubCutaneous three times a day before meals  insulin regular  human corrective regimen sliding scale   SubCutaneous every 6 hours  pantoprazole    Tablet 40 milliGRAM(s) Oral two times a day  potassium chloride    Tablet ER 40 milliEquivalent(s) Oral once  sodium chloride 0.9%. 1000 milliLiter(s) (80 mL/Hr) IV Continuous <Continuous>    MEDICATIONS  (PRN):  acetaminophen     Tablet .. 650 milliGRAM(s) Oral every 6 hours PRN Temp greater or equal to 38C (100.4F), Mild Pain (1 - 3)  calcium carbonate    500 mG (Tums) Chewable 1 Tablet(s) Chew three times a day PRN Dyspepsia  oxyCODONE    IR 5 milliGRAM(s) Oral every 6 hours PRN Moderate Pain (4 - 6)  simethicone 80 milliGRAM(s) Chew two times a day PRN Heartburn      ALLERGIES:  Allergies    No Known Allergies    Intolerances        LABS:                        9.2    10.70 )-----------( 448      ( 03 Dec 2021 06:58 )             27.8     12-03    131<L>  |  96  |  21  ----------------------------<  179<H>  3.6   |  20<L>  |  1.22    Ca    9.3      03 Dec 2021 06:58  Phos  2.5     12-02  Mg     1.9     12-03    TPro  7.5  /  Alb  2.5<L>  /  TBili  0.3  /  DBili  x   /  AST  8<L>  /  ALT  <5<L>  /  AlkPhos  149<H>  12-03        CAPILLARY BLOOD GLUCOSE      POCT Blood Glucose.: 215 mg/dL (03 Dec 2021 12:48)      RADIOLOGY & ADDITIONAL TESTS: Reviewed.

## 2021-12-03 NOTE — PROGRESS NOTE ADULT - SUBJECTIVE AND OBJECTIVE BOX
INTERVAL HPI/OVERNIGHT EVENTS:    Patient is a 46y old  Male who presents with a chief complaint of DKA (03 Dec 2021 14:56)  Patient has no complaints today. He reports better appetite today and is attempting to eat a starch at each meal. NO vascular interventions at this time. Patient may go for wound clean out Monday of the left foot.     Pt reports the following symptoms:    CONSTITUTIONAL:  Negative fever or chills, feels well, good appetite  EYES:  Negative  blurry vision or double vision  CARDIOVASCULAR:  Negative for chest pain or palpitations  RESPIRATORY:  Negative for cough, wheezing, or SOB   GASTROINTESTINAL:  Negative for nausea, vomiting, diarrhea, constipation, or abdominal pain  GENITOURINARY:  Negative frequency, urgency or dysuria  NEUROLOGIC:  No headache, confusion, dizziness, lightheadedness    MEDICATIONS  (STANDING):  atorvastatin 20 milliGRAM(s) Oral at bedtime  baclofen 5 milliGRAM(s) Oral three times a day  cefTRIAXone   IVPB 2000 milliGRAM(s) IV Intermittent every 24 hours  Dakins Solution - 1/4 Strength 1 Application(s) Topical daily  dextrose 40% Gel 15 Gram(s) Oral once  dextrose 5%. 1000 milliLiter(s) (100 mL/Hr) IV Continuous <Continuous>  dextrose 50% Injectable 50 milliLiter(s) IV Push every 15 minutes  glucagon  Injectable 1 milliGRAM(s) IntraMuscular once  heparin   Injectable 5000 Unit(s) SubCutaneous every 8 hours  insulin glargine Injectable (LANTUS) 50 Unit(s) SubCutaneous at bedtime  insulin lispro Injectable (ADMELOG) 16 Unit(s) SubCutaneous three times a day before meals  insulin regular  human corrective regimen sliding scale   SubCutaneous every 6 hours  pantoprazole    Tablet 40 milliGRAM(s) Oral two times a day  potassium chloride    Tablet ER 40 milliEquivalent(s) Oral once  sodium chloride 0.9%. 1000 milliLiter(s) (50 mL/Hr) IV Continuous <Continuous>    MEDICATIONS  (PRN):  acetaminophen     Tablet .. 650 milliGRAM(s) Oral every 6 hours PRN Temp greater or equal to 38C (100.4F), Mild Pain (1 - 3)  calcium carbonate    500 mG (Tums) Chewable 1 Tablet(s) Chew three times a day PRN Dyspepsia  oxyCODONE    IR 5 milliGRAM(s) Oral every 6 hours PRN Moderate Pain (4 - 6)  simethicone 80 milliGRAM(s) Chew two times a day PRN Heartburn      PHYSICAL EXAM  Vital Signs Last 24 Hrs  T(C): 36.9 (03 Dec 2021 09:10), Max: 36.9 (03 Dec 2021 05:33)  T(F): 98.5 (03 Dec 2021 09:10), Max: 98.5 (03 Dec 2021 05:33)  HR: 96 (03 Dec 2021 09:10) (87 - 100)  BP: 105/60 (03 Dec 2021 09:10) (105/60 - 120/69)  BP(mean): --  RR: 18 (03 Dec 2021 09:10) (18 - 20)  SpO2: 96% (03 Dec 2021 09:10) (96% - 97%)    PHYSICAL EXAM:  Constitutional: malnourished male, alert in no acute distress.  HEENT: head atraumatic, PERRL, EMOI, Oropharyngeal mucosa moist, pink, tongue midline, Neck supple, FROM, no LAD  Respiratory: Clear to auscultation bilaterally.  No rales, rhonchi, wheezes.  Cardiovascular: Regular rate and rhythm.  S1, S2 heard.  Gastrointestinal:  Soft, nontender, nondistended, decreased bowel sounds  Vascular: b/l LE cool to touch, 2+ b/l radial pulses, 0-1+ DP pulses b/l, LLE s/p amputation of all toes.   Neurological: AAOx3, 5/5 strength, decreased sensation in b/l LE   Skin: b/l feet wrapped in gauze. malodorous     LABS:                        9.2    10.70 )-----------( 448      ( 03 Dec 2021 06:58 )             27.8     12-03    131<L>  |  96  |  21  ----------------------------<  179<H>  3.6   |  20<L>  |  1.22    Ca    9.3      03 Dec 2021 06:58  Phos  2.5     12-02  Mg     1.9     12-03    TPro  7.5  /  Alb  2.5<L>  /  TBili  0.3  /  DBili  x   /  AST  8<L>  /  ALT  <5<L>  /  AlkPhos  149<H>  12-03            HbA1C:         Insulin Sliding Scale requirements X 24 Hours:      RADIOLOGY & ADDITIONAL TESTS:      A/P:46y male poorly controlled diabetic with right foot osteomyelitis admitted with DKA and in need of amputation.   DKA resolved   A1C: 14.3%  Wt: 77.6kg  Cr: 1.12  GFR: 91  Home regimen: 50 units Basiglar, 15 units Novolog       s/p Partial amputation of R 5th toe yesterday. Deep wound cultures from surgery growing group B strep, enterococcus, proteus.  per podiatry infection seeding to calcaneous. May go for washout of left foot Monday.     1.  DM type 2 - uncontrolled   Please continue Lantus  50  units at night.    Please continue Lispro  16   units before each meal.    Please continue Lispro moderate dose sliding scale four times daily with meals and at bedtime       Pt's fingerstick glucose goal is 100-180    Will continue to monitor       Pt can follow up at discharge with [facility] by calling  to make an appointment.   Will discuss case with Dr. Haines  and update primary team

## 2021-12-03 NOTE — PROGRESS NOTE ADULT - PROBLEM SELECTOR PLAN 2
Patient initially admitted for DKA with glucose 912 on admission w/ AG 31, K 6.8, bicarb 11, BHB 6.1. Pt diagnosed w/ DM last year 11/2020 when he was hospitalized for DKA, also had R toe amputation during that hospitalization. Reportedly takes insulin at home but only intermittently compliant. Reportedly takes basaglar 40u qhs, novolog 1u TID, but prescribed 50u qhs, 15u TID. Meds last picked up 8/2021.  - off insulin drip , DKA resolved   - A1c 14.3%  -c/w SS for now   -per endocrine lantus increased to 50 units at night, lispro 12 units pre-meal TID    -c/w lispro moderate dose sliding scale four times daily with meals and at bedtime  -consider restarting insulin drip if fingersticks start to run high on current regimen   -monitor fingerstick glucose readings, goal 100-180

## 2021-12-03 NOTE — PROGRESS NOTE ADULT - PROBLEM SELECTOR PLAN 7
F: none  E: replete PRN  N: consistent carb   GI ppx: PPI  DVT ppx: heparin  Code status: FULL CODE  Dispo: [facility]

## 2021-12-03 NOTE — PROGRESS NOTE ADULT - ASSESSMENT
46yoM with PMH IDDM, DKA, and L TMA 2/2 diabetic infections who presented to from [facility] DKA and treated with insulin gtt, now on sliding scale. Podiatry consulted for management of infected diabetic ulcer of R foot with imaging concerning for osteomyelitis now s/p incision & drainage, wound debridement and partial ray amputation of R 5th metatarsal 12/1 with concern for proximal extension of osteomyelitis. Vascular surgery consulted for evaluation and management.     Plan for further debridement by podiatry, if no improvement will consider vascular intervention  Imaging reviewed  Endocrinology following   Podiatry following   Remainder of care per primary team   Team 3c vascular surgery will continue to follow      Plan discussed with attending and chief resident.   ____________________________________________________   [place] - Resident   Surgery

## 2021-12-03 NOTE — PROGRESS NOTE ADULT - ASSESSMENT
45yo M PMH IDDM (diagnosed 11/2020) presented from [facility] complaining of chest pain. Reports polyuria, polydipsia and decreased appetite x1 week. Of note, pt was diagnosed w/ DM last year 11/2020 at which time he was admitted for DKA and all toes on his L foot were amputated.    Podiatry was consulted for the management of the right foot infected diabetic ulcer. Pt was noted to have gas on xray near the 5th metatarsal, although the xray read did not mention it. Right foot is malodorous and 5ccs of purulent drainage was expressed from the proximal aspect of the ulcer (dorsally over the 4th and 5th metatarsal). MICU was notified about the findings of gas on xray. Podiatry Attending discussed with MICU Attending about the importance of taking the patient to the OR immediately, however given his abn electrolyte (Potassium) and possibility of patient having arrythmia' s and increased glucose-- it was decided that we wait until patient has finished his drip and has labs have normalized. Re-evaluated in AM (12/1), pt vital signs have clinically improved. OR pushed back to evening due to emergent case in AM. CT confirms osteo of 5th MT.  Of note, pt is now s/p right partial 5th ray resection packed open.  Abscess was noted in the OR and was drained.     Of note, Examination on 12/2/21 revealed wound on Left foot plantar aspect. +malodor. Xrays are negative for gas/OM.    Plan:  - Wound packed open with Dakin's soaked gauze, dressed with DSD, ABD and ACE wrap.   - Wrapped Left foot with wet to dry and DSD   - F/U vasc recs  - f/u OR final Cx- polymicrobial growth   - NWB to RLE  - rest of plan per primary team     Podiatry Following

## 2021-12-03 NOTE — PROGRESS NOTE ADULT - PROBLEM SELECTOR PLAN 1
Pt w/ known h/o DKA and h/o amputation of L toes 11/2020 2/2 diabetic neuropathy.  - XR R foot: relative osteopenia of the fifth ray, equivocal for sequelae of acute osteomyelitis.   -CT right foot w/ contrast showing extensive soft tissue defect with air infiltration extending into the fifth metatarsal, proximal and middle phalanges of the fifth digit c/w osteomyelitis   - per ID abx changed to ceftriaxone 2g q24  - s/p partial R foot amputation 12/01 by podiatry  -wound care per podiatry   - per podiatry wound probed plantar proximally towards calcaneus, pt may require BKA. planning to do further debridement of right foot monday   - NWB to RLE  -BCs 12/1 NGTD  -deep wound cultures from surgical site 12/1 growing group B strep and proteus   -PT consulted, recs [facility]  -arterial duplex of RLE showing mild PAD without significant stenosis   -vascular following, will consider vascular intervention if no improvement with further debridement by podiatry

## 2021-12-03 NOTE — PROGRESS NOTE ADULT - PROBLEM SELECTOR PLAN 6
Patient with Na of 131 today which is improved from 129 yesterday, likely hypovolemic as urine lytes showing pre-renal picture and improved with IV fluids and PO intake   -continue to trend

## 2021-12-04 LAB
ANION GAP SERPL CALC-SCNC: 13 MMOL/L — SIGNIFICANT CHANGE UP (ref 5–17)
ANISOCYTOSIS BLD QL: SLIGHT — SIGNIFICANT CHANGE UP
BASOPHILS # BLD AUTO: 0 K/UL — SIGNIFICANT CHANGE UP (ref 0–0.2)
BASOPHILS NFR BLD AUTO: 0 % — SIGNIFICANT CHANGE UP (ref 0–2)
BUN SERPL-MCNC: 16 MG/DL — SIGNIFICANT CHANGE UP (ref 7–23)
CALCIUM SERPL-MCNC: 9.6 MG/DL — SIGNIFICANT CHANGE UP (ref 8.4–10.5)
CHLORIDE SERPL-SCNC: 96 MMOL/L — SIGNIFICANT CHANGE UP (ref 96–108)
CO2 SERPL-SCNC: 22 MMOL/L — SIGNIFICANT CHANGE UP (ref 22–31)
CREAT SERPL-MCNC: 1.38 MG/DL — HIGH (ref 0.5–1.3)
EOSINOPHIL # BLD AUTO: 0.16 K/UL — SIGNIFICANT CHANGE UP (ref 0–0.5)
EOSINOPHIL NFR BLD AUTO: 1.7 % — SIGNIFICANT CHANGE UP (ref 0–6)
GIANT PLATELETS BLD QL SMEAR: PRESENT — SIGNIFICANT CHANGE UP
GLUCOSE BLDC GLUCOMTR-MCNC: 126 MG/DL — HIGH (ref 70–99)
GLUCOSE BLDC GLUCOMTR-MCNC: 179 MG/DL — HIGH (ref 70–99)
GLUCOSE BLDC GLUCOMTR-MCNC: 241 MG/DL — HIGH (ref 70–99)
GLUCOSE BLDC GLUCOMTR-MCNC: 331 MG/DL — HIGH (ref 70–99)
GLUCOSE BLDC GLUCOMTR-MCNC: 417 MG/DL — HIGH (ref 70–99)
GLUCOSE BLDC GLUCOMTR-MCNC: 75 MG/DL — SIGNIFICANT CHANGE UP (ref 70–99)
GLUCOSE SERPL-MCNC: 262 MG/DL — HIGH (ref 70–99)
HCT VFR BLD CALC: 28.9 % — LOW (ref 39–50)
HGB BLD-MCNC: 9.7 G/DL — LOW (ref 13–17)
HYPOCHROMIA BLD QL: SLIGHT — SIGNIFICANT CHANGE UP
LYMPHOCYTES # BLD AUTO: 1.09 K/UL — SIGNIFICANT CHANGE UP (ref 1–3.3)
LYMPHOCYTES # BLD AUTO: 11.4 % — LOW (ref 13–44)
MACROCYTES BLD QL: SLIGHT — SIGNIFICANT CHANGE UP
MAGNESIUM SERPL-MCNC: 1.9 MG/DL — SIGNIFICANT CHANGE UP (ref 1.6–2.6)
MANUAL SMEAR VERIFICATION: SIGNIFICANT CHANGE UP
MCHC RBC-ENTMCNC: 29.2 PG — SIGNIFICANT CHANGE UP (ref 27–34)
MCHC RBC-ENTMCNC: 33.6 GM/DL — SIGNIFICANT CHANGE UP (ref 32–36)
MCV RBC AUTO: 87 FL — SIGNIFICANT CHANGE UP (ref 80–100)
METAMYELOCYTES # FLD: 6.1 % — HIGH (ref 0–0)
MONOCYTES # BLD AUTO: 1.17 K/UL — HIGH (ref 0–0.9)
MONOCYTES NFR BLD AUTO: 12.3 % — SIGNIFICANT CHANGE UP (ref 2–14)
MYELOCYTES NFR BLD: 5.3 % — HIGH (ref 0–0)
NEUTROPHILS # BLD AUTO: 6.02 K/UL — SIGNIFICANT CHANGE UP (ref 1.8–7.4)
NEUTROPHILS NFR BLD AUTO: 63.2 % — SIGNIFICANT CHANGE UP (ref 43–77)
NRBC # BLD: 1 /100 — HIGH (ref 0–0)
NRBC # BLD: SIGNIFICANT CHANGE UP /100 WBCS (ref 0–0)
PLAT MORPH BLD: ABNORMAL
PLATELET # BLD AUTO: 497 K/UL — HIGH (ref 150–400)
POIKILOCYTOSIS BLD QL AUTO: SLIGHT — SIGNIFICANT CHANGE UP
POTASSIUM SERPL-MCNC: 3.8 MMOL/L — SIGNIFICANT CHANGE UP (ref 3.5–5.3)
POTASSIUM SERPL-SCNC: 3.8 MMOL/L — SIGNIFICANT CHANGE UP (ref 3.5–5.3)
RBC # BLD: 3.32 M/UL — LOW (ref 4.2–5.8)
RBC # FLD: 13.5 % — SIGNIFICANT CHANGE UP (ref 10.3–14.5)
RBC BLD AUTO: ABNORMAL
SODIUM SERPL-SCNC: 131 MMOL/L — LOW (ref 135–145)
WBC # BLD: 9.52 K/UL — SIGNIFICANT CHANGE UP (ref 3.8–10.5)
WBC # FLD AUTO: 9.52 K/UL — SIGNIFICANT CHANGE UP (ref 3.8–10.5)

## 2021-12-04 PROCEDURE — 99233 SBSQ HOSP IP/OBS HIGH 50: CPT | Mod: GC

## 2021-12-04 PROCEDURE — 99232 SBSQ HOSP IP/OBS MODERATE 35: CPT | Mod: GC

## 2021-12-04 PROCEDURE — 73701 CT LOWER EXTREMITY W/DYE: CPT | Mod: 26,LT

## 2021-12-04 RX ORDER — INSULIN HUMAN 100 [IU]/ML
10 INJECTION, SOLUTION SUBCUTANEOUS ONCE
Refills: 0 | Status: DISCONTINUED | OUTPATIENT
Start: 2021-12-04 | End: 2021-12-04

## 2021-12-04 RX ORDER — INSULIN GLARGINE 100 [IU]/ML
56 INJECTION, SOLUTION SUBCUTANEOUS AT BEDTIME
Refills: 0 | Status: DISCONTINUED | OUTPATIENT
Start: 2021-12-04 | End: 2021-12-05

## 2021-12-04 RX ORDER — INSULIN LISPRO 100/ML
VIAL (ML) SUBCUTANEOUS
Refills: 0 | Status: DISCONTINUED | OUTPATIENT
Start: 2021-12-04 | End: 2021-12-07

## 2021-12-04 RX ADMIN — OXYCODONE HYDROCHLORIDE 5 MILLIGRAM(S): 5 TABLET ORAL at 17:04

## 2021-12-04 RX ADMIN — SIMETHICONE 80 MILLIGRAM(S): 80 TABLET, CHEWABLE ORAL at 00:13

## 2021-12-04 RX ADMIN — ATORVASTATIN CALCIUM 20 MILLIGRAM(S): 80 TABLET, FILM COATED ORAL at 23:27

## 2021-12-04 RX ADMIN — HEPARIN SODIUM 5000 UNIT(S): 5000 INJECTION INTRAVENOUS; SUBCUTANEOUS at 23:26

## 2021-12-04 RX ADMIN — Medication 16 UNIT(S): at 10:34

## 2021-12-04 RX ADMIN — Medication 16 UNIT(S): at 12:38

## 2021-12-04 RX ADMIN — Medication 5 MILLIGRAM(S): at 17:04

## 2021-12-04 RX ADMIN — Medication 5 MILLIGRAM(S): at 23:26

## 2021-12-04 RX ADMIN — OXYCODONE HYDROCHLORIDE 5 MILLIGRAM(S): 5 TABLET ORAL at 18:04

## 2021-12-04 RX ADMIN — CEFTRIAXONE 100 MILLIGRAM(S): 500 INJECTION, POWDER, FOR SOLUTION INTRAMUSCULAR; INTRAVENOUS at 17:32

## 2021-12-04 RX ADMIN — INSULIN GLARGINE 56 UNIT(S): 100 INJECTION, SOLUTION SUBCUTANEOUS at 23:25

## 2021-12-04 RX ADMIN — HEPARIN SODIUM 5000 UNIT(S): 5000 INJECTION INTRAVENOUS; SUBCUTANEOUS at 06:10

## 2021-12-04 RX ADMIN — INSULIN HUMAN 4: 100 INJECTION, SOLUTION SUBCUTANEOUS at 07:12

## 2021-12-04 RX ADMIN — Medication 5 MILLIGRAM(S): at 06:10

## 2021-12-04 RX ADMIN — INSULIN HUMAN 12: 100 INJECTION, SOLUTION SUBCUTANEOUS at 12:37

## 2021-12-04 RX ADMIN — PANTOPRAZOLE SODIUM 40 MILLIGRAM(S): 20 TABLET, DELAYED RELEASE ORAL at 06:10

## 2021-12-04 RX ADMIN — PANTOPRAZOLE SODIUM 40 MILLIGRAM(S): 20 TABLET, DELAYED RELEASE ORAL at 17:04

## 2021-12-04 RX ADMIN — INSULIN HUMAN 6: 100 INJECTION, SOLUTION SUBCUTANEOUS at 00:12

## 2021-12-04 RX ADMIN — Medication 2: at 23:26

## 2021-12-04 NOTE — PROGRESS NOTE ADULT - PROBLEM SELECTOR PLAN 7
F: none  E: replete PRN  N: consistent carb   GI ppx: PPI  DVT ppx: heparin  Code status: FULL CODE  Dispo: [facility]

## 2021-12-04 NOTE — PROGRESS NOTE ADULT - SUBJECTIVE AND OBJECTIVE BOX
OVERNIGHT EVENTS: NAEO    SUBJECTIVE / INTERVAL HPI: Patient seen and examined at bedside. Reports feeling well, no chest pain overnight. Still hiccuping. Reports pain in feet last night but well-controlled currently. No other complaints.   Remaining ROS negative     Vital Signs Last 24 Hrs  T(C): 36.8 (04 Dec 2021 05:36), Max: 36.9 (03 Dec 2021 17:31)  T(F): 98.2 (04 Dec 2021 05:36), Max: 98.4 (03 Dec 2021 17:31)  HR: 87 (04 Dec 2021 05:36) (87 - 107)  BP: 130/76 (04 Dec 2021 05:36) (107/55 - 132/80)  BP(mean): --  RR: 18 (04 Dec 2021 05:36) (18 - 18)  SpO2: 97% (04 Dec 2021 05:36) (97% - 100%)    PHYSICAL EXAM:    General: sitting up in bed, NAD   HEENT: NC/AT; PERRL, anicteric sclera; MMM  Neck: supple, no JVD   Cardiovascular: +S1/S2, RRR, no murmurs   Respiratory: CTA B/L; no W/R/R. no increased WOB.   Gastrointestinal: soft, NT/ND; +BSx4  Extremities: WWP; no edema, clubbing or cyanosis. L foot s/p toes amputated, wrapped in gauze. RLE wrapped in gauze and ACE.   Vascular: 2+ radial pulses B/L  Neurological: AAOx3; decreased sensation in b/l LEs. 5/5 muscle strength.  Dermatologic: no appreciable wounds or damage to the skin. dry flaking skin on b/l shins.      MEDICATIONS  (STANDING):  atorvastatin 20 milliGRAM(s) Oral at bedtime  baclofen 5 milliGRAM(s) Oral three times a day  cefTRIAXone   IVPB 2000 milliGRAM(s) IV Intermittent every 24 hours  Dakins Solution - 1/4 Strength 1 Application(s) Topical daily  dextrose 40% Gel 15 Gram(s) Oral once  dextrose 5%. 1000 milliLiter(s) (100 mL/Hr) IV Continuous <Continuous>  dextrose 50% Injectable 50 milliLiter(s) IV Push every 15 minutes  glucagon  Injectable 1 milliGRAM(s) IntraMuscular once  heparin   Injectable 5000 Unit(s) SubCutaneous every 8 hours  insulin glargine Injectable (LANTUS) 50 Unit(s) SubCutaneous at bedtime  insulin lispro Injectable (ADMELOG) 16 Unit(s) SubCutaneous three times a day before meals  insulin regular  human corrective regimen sliding scale   SubCutaneous every 6 hours  insulin regular  human recombinant. 10 Unit(s) SubCutaneous once  pantoprazole    Tablet 40 milliGRAM(s) Oral two times a day  potassium chloride    Tablet ER 40 milliEquivalent(s) Oral once    MEDICATIONS  (PRN):  acetaminophen     Tablet .. 650 milliGRAM(s) Oral every 6 hours PRN Temp greater or equal to 38C (100.4F), Mild Pain (1 - 3)  calcium carbonate    500 mG (Tums) Chewable 1 Tablet(s) Chew three times a day PRN Dyspepsia  oxyCODONE    IR 5 milliGRAM(s) Oral every 6 hours PRN Moderate Pain (4 - 6)  simethicone 80 milliGRAM(s) Chew two times a day PRN Heartburn        ALLERGIES:  Allergies    No Known Allergies    Intolerances        LABS:                          9.7    9.52  )-----------( 497      ( 04 Dec 2021 07:51 )             28.9   12-04    131<L>  |  96  |  16  ----------------------------<  262<H>  3.8   |  22  |  1.38<H>    Ca    9.6      04 Dec 2021 07:51  Mg     1.9     12-04    TPro  7.5  /  Alb  2.5<L>  /  TBili  0.3  /  DBili  x   /  AST  8<L>  /  ALT  <5<L>  /  AlkPhos  149<H>  12-03          CAPILLARY BLOOD GLUCOSE      POCT Blood Glucose.: 215 mg/dL (03 Dec 2021 12:48)      RADIOLOGY & ADDITIONAL TESTS: Reviewed.

## 2021-12-04 NOTE — PROGRESS NOTE ADULT - SUBJECTIVE AND OBJECTIVE BOX
INTERVAL HPI/OVERNIGHT EVENTS:    Patient is a 46y old  Male who presents with a chief complaint of DKA (04 Dec 2021 13:47)      Pt reports the following symptoms:    CONSTITUTIONAL:  Negative fever or chills, feels well, good appetite  EYES:  Negative  blurry vision or double vision  CARDIOVASCULAR:  Negative for chest pain or palpitations  RESPIRATORY:  Negative for cough, wheezing, or SOB   GASTROINTESTINAL:  Negative for nausea, vomiting, diarrhea, constipation, or abdominal pain  GENITOURINARY:  Negative frequency, urgency or dysuria  NEUROLOGIC:  No headache, confusion, dizziness, lightheadedness    MEDICATIONS  (STANDING):  atorvastatin 20 milliGRAM(s) Oral at bedtime  baclofen 5 milliGRAM(s) Oral three times a day  cefTRIAXone   IVPB 2000 milliGRAM(s) IV Intermittent every 24 hours  Dakins Solution - 1/4 Strength 1 Application(s) Topical daily  dextrose 40% Gel 15 Gram(s) Oral once  dextrose 5%. 1000 milliLiter(s) (100 mL/Hr) IV Continuous <Continuous>  dextrose 50% Injectable 50 milliLiter(s) IV Push every 15 minutes  glucagon  Injectable 1 milliGRAM(s) IntraMuscular once  heparin   Injectable 5000 Unit(s) SubCutaneous every 8 hours  insulin glargine Injectable (LANTUS) 56 Unit(s) SubCutaneous at bedtime  insulin lispro (ADMELOG) corrective regimen sliding scale   SubCutaneous Before meals and at bedtime  insulin lispro Injectable (ADMELOG) 16 Unit(s) SubCutaneous three times a day before meals  pantoprazole    Tablet 40 milliGRAM(s) Oral two times a day  potassium chloride    Tablet ER 40 milliEquivalent(s) Oral once    MEDICATIONS  (PRN):  acetaminophen     Tablet .. 650 milliGRAM(s) Oral every 6 hours PRN Temp greater or equal to 38C (100.4F), Mild Pain (1 - 3)  calcium carbonate    500 mG (Tums) Chewable 1 Tablet(s) Chew three times a day PRN Dyspepsia  oxyCODONE    IR 5 milliGRAM(s) Oral every 6 hours PRN Moderate Pain (4 - 6)  simethicone 80 milliGRAM(s) Chew two times a day PRN Heartburn      Past medical history reviewed  Family history reviewed  Social history reviewed    PHYSICAL EXAM  Vital Signs Last 24 Hrs  T(C): 36.8 (04 Dec 2021 05:36), Max: 36.9 (03 Dec 2021 17:31)  T(F): 98.2 (04 Dec 2021 05:36), Max: 98.4 (03 Dec 2021 17:31)  HR: 87 (04 Dec 2021 05:36) (87 - 107)  BP: 130/76 (04 Dec 2021 05:36) (107/55 - 132/80)  BP(mean): --  RR: 18 (04 Dec 2021 05:36) (18 - 18)  SpO2: 97% (04 Dec 2021 05:36) (97% - 100%)    Constitutional: wn/wd in NAD.   HEENT: NCAT, MMM, OP clear, EOMI, no proptosis or lid retraction  Neck: no thyromegaly or palpable thyroid nodules   Respiratory: lungs CTAB.  Cardiovascular: regular rhythm, normal S1 and S2, no audible murmurs, no peripheral edema  GI: soft, NT/ND, no masses/HSM appreciated.  Neurology: no tremors, DTR 2+  Skin: no visible rashes/lesions  Psychiatric: AAO x 3, normal affect/mood.    LABS:                        9.7    9.52  )-----------( 497      ( 04 Dec 2021 07:51 )             28.9     12-04    131<L>  |  96  |  16  ----------------------------<  262<H>  3.8   |  22  |  1.38<H>    Ca    9.6      04 Dec 2021 07:51  Mg     1.9     12-04    TPro  7.5  /  Alb  2.5<L>  /  TBili  0.3  /  DBili  x   /  AST  8<L>  /  ALT  <5<L>  /  AlkPhos  149<H>  12-03            HbA1C: 14.3 % (11-30 @ 19:01)      CAPILLARY BLOOD GLUCOSE      POCT Blood Glucose.: 126 mg/dL (04 Dec 2021 14:17)  POCT Blood Glucose.: 417 mg/dL (04 Dec 2021 12:28)  POCT Blood Glucose.: 331 mg/dL (04 Dec 2021 10:04)  POCT Blood Glucose.: 241 mg/dL (04 Dec 2021 06:29)  POCT Blood Glucose.: 281 mg/dL (03 Dec 2021 23:56)  POCT Blood Glucose.: 308 mg/dL (03 Dec 2021 21:34)  POCT Blood Glucose.: 150 mg/dL (03 Dec 2021 17:01)      Cholesterol, Serum: 143 mg/dL (12-02-21 @ 07:47)  HDL Cholesterol, Serum: 31 mg/dL (12-02-21 @ 07:47)  Triglycerides, Serum: 147 mg/dL (12-02-21 @ 07:47)         INTERVAL HPI/OVERNIGHT EVENTS:    Patient is a 46y old  Male who presents with a chief complaint of DKA (04 Dec 2021 13:47)  MISSY overnight. He repots good appetite.  Complains of foot pain.    He was on insulin reg SS Q6h instead of MDSS and he received.   Cr: 1.3.   Complains of foot pain.     FSG & Insulin received:  Yesterday:  pre-dinner fs  nutritional lispro  16 units   bedtime fs  lantus  50 units +  6  units lispro SS    Today:  pre-breakfast fs  nutritional lispro  16 units+4    units reg SS  pre-lunch fs  nutritional lispro  16 units+ 12  units reg  SS  Pt reports the following symptoms:    CONSTITUTIONAL:   good appetite  EYES:  Negative  blurry vision or double vision  CARDIOVASCULAR:  Negative for chest pain or palpitations  RESPIRATORY:  Negative for cough, wheezing, or SOB   GASTROINTESTINAL:  Negative for nausea, vomiting, diarrhea, constipation, or abdominal pain  GENITOURINARY:  Negative frequency, urgency or dysuria  NEUROLOGIC:  No headache, confusion    MEDICATIONS  (STANDING):  atorvastatin 20 milliGRAM(s) Oral at bedtime  baclofen 5 milliGRAM(s) Oral three times a day  cefTRIAXone   IVPB 2000 milliGRAM(s) IV Intermittent every 24 hours  Dakins Solution - 1/4 Strength 1 Application(s) Topical daily  dextrose 40% Gel 15 Gram(s) Oral once  dextrose 5%. 1000 milliLiter(s) (100 mL/Hr) IV Continuous <Continuous>  dextrose 50% Injectable 50 milliLiter(s) IV Push every 15 minutes  glucagon  Injectable 1 milliGRAM(s) IntraMuscular once  heparin   Injectable 5000 Unit(s) SubCutaneous every 8 hours  insulin glargine Injectable (LANTUS) 56 Unit(s) SubCutaneous at bedtime  insulin lispro (ADMELOG) corrective regimen sliding scale   SubCutaneous Before meals and at bedtime  insulin lispro Injectable (ADMELOG) 16 Unit(s) SubCutaneous three times a day before meals  pantoprazole    Tablet 40 milliGRAM(s) Oral two times a day  potassium chloride    Tablet ER 40 milliEquivalent(s) Oral once    MEDICATIONS  (PRN):  acetaminophen     Tablet .. 650 milliGRAM(s) Oral every 6 hours PRN Temp greater or equal to 38C (100.4F), Mild Pain (1 - 3)  calcium carbonate    500 mG (Tums) Chewable 1 Tablet(s) Chew three times a day PRN Dyspepsia  oxyCODONE    IR 5 milliGRAM(s) Oral every 6 hours PRN Moderate Pain (4 - 6)  simethicone 80 milliGRAM(s) Chew two times a day PRN Heartburn      Past medical history reviewed  Family history reviewed  Social history reviewed    PHYSICAL EXAM  Vital Signs Last 24 Hrs  T(C): 36.8 (04 Dec 2021 05:36), Max: 36.9 (03 Dec 2021 17:31)  T(F): 98.2 (04 Dec 2021 05:36), Max: 98.4 (03 Dec 2021 17:31)  HR: 87 (04 Dec 2021 05:36) (87 - 107)  BP: 130/76 (04 Dec 2021 05:36) (107/55 - 132/80)  BP(mean): --  RR: 18 (04 Dec 2021 05:36) (18 - 18)  SpO2: 97% (04 Dec 2021 05:36) (97% - 100%)    Constitutional:  in NAD.   HEENT:  no proptosis or lid retraction  Neck: no thyromegaly or palpable thyroid nodules   Respiratory: lungs CTAB.  Cardiovascular: regular rhythm, normal S1 and S2  GI: soft, NT/ND, no masses/HSM appreciated.  Neurology: no tremors, DTR 2+  Psychiatric: AAO x 3    LABS:                        9.7    9.52  )-----------( 497      ( 04 Dec 2021 07:51 )             28.9     12-04    131<L>  |  96  |  16  ----------------------------<  262<H>  3.8   |  22  |  1.38<H>    Ca    9.6      04 Dec 2021 07:51  Mg     1.9     12-04    TPro  7.5  /  Alb  2.5<L>  /  TBili  0.3  /  DBili  x   /  AST  8<L>  /  ALT  <5<L>  /  AlkPhos  149<H>  12-03            HbA1C: 14.3 % ( @ 19:01)      CAPILLARY BLOOD GLUCOSE      POCT Blood Glucose.: 126 mg/dL (04 Dec 2021 14:17)  POCT Blood Glucose.: 417 mg/dL (04 Dec 2021 12:28)  POCT Blood Glucose.: 331 mg/dL (04 Dec 2021 10:04)  POCT Blood Glucose.: 241 mg/dL (04 Dec 2021 06:29)  POCT Blood Glucose.: 281 mg/dL (03 Dec 2021 23:56)  POCT Blood Glucose.: 308 mg/dL (03 Dec 2021 21:34)  POCT Blood Glucose.: 150 mg/dL (03 Dec 2021 17:01)      Cholesterol, Serum: 143 mg/dL (21 @ 07:47)  HDL Cholesterol, Serum: 31 mg/dL (21 @ 07:47)  Triglycerides, Serum: 147 mg/dL (21 @ 07:47)      A/P:46y male poorly controlled diabetic with right foot osteomyelitis admitted with DKA and in need of amputation.   DKA resolved   A1C: 14.3%  Wt: 77.6kg  Cr: 1.12  GFR: 91  Home regimen: 50 units Basiglar, 15 units Novolog       s/p Partial amputation of R 5th toe yesterday. Deep wound cultures from surgery growing group B strep, enterococcus, proteus.  per podiatry infection seeding to calcaneous. May go for washout of left foot Monday.     1.  DM type 2 - uncontrolled   Please increase Lantus  56  units at night.    Please continue Lispro  16   units before each meal.    please discontinue regular insulin Q6h  please monitor fsg closely and watch for hypoglycemia abc pt received total 16 u Lispro + 4 reg for breakfast and 16 u Lispro + 12 u reg for lunch   Please start  Lispro moderate dose sliding scale four times daily with meals and at bedtime       Pt's fingerstick glucose goal is 100-180    Will continue to monitor       Pt can follow up at discharge with [facility] by calling  to make an appointment.    case discussed with Dr. Cullen and update primary team

## 2021-12-04 NOTE — PROGRESS NOTE ADULT - ATTENDING COMMENTS
This is a 46 year old man h/o poorly controlled T2DM with right foot osteomyelitis s/p TMA who was in DKA during this admission. Has been on basal bolus insulin. Also had been getting regular insulin moderate dose sliding scale instead of Lispro sliding scale - this was corrected today. Agree with Dr. Amador's recommendations to Increase lantus to 56u qhs and continue Aspart 16u qAc.    Florecita Cullen MD  Attending Endocrinologist    I spent 35 minutes on the total encounter. More than 50% of the visit was spent counseling and/or coordinating care by me, the attending physician.

## 2021-12-04 NOTE — PROGRESS NOTE ADULT - PROBLEM SELECTOR PLAN 2
Patient continues to be hyperglycemic - FS in 500's  Patient initially admitted for DKA with glucose 912 on admission w/ AG 31, K 6.8, bicarb 11, BHB 6.1. Pt diagnosed w/ DM last year 11/2020 when he was hospitalized for DKA, also had R toe amputation during that hospitalization. Reportedly takes insulin at home but only intermittently compliant. Reportedly takes basaglar 40u qhs, novolog 1u TID, but prescribed 50u qhs, 15u TID. Meds last picked up 8/2021.  - off insulin drip , DKA resolved   - A1c 14.3%  -c/w SS for now   -per endocrine lantus increased to 50 units at night, lispro 12 units pre-meal TID    -c/w lispro moderate dose sliding scale four times daily with meals and at bedtime  -consider restarting insulin drip if fingersticks start to run high on current regimen   -monitor fingerstick glucose readings, goal 100-180

## 2021-12-04 NOTE — PROGRESS NOTE ADULT - ASSESSMENT
45yo M PMH IDDM (diagnosed 11/2020) presented from [facility] complaining of chest pain. Reports polyuria, polydipsia and decreased appetite x1 week. Of note, pt was diagnosed w/ DM last year 11/2020 at which time he was admitted for DKA and all toes on his L foot were amputated.    Podiatry was consulted for the management of the right foot infected diabetic ulcer. Pt was noted to have gas on xray near the 5th metatarsal, although the xray read did not mention it. Right foot is malodorous and 5ccs of purulent drainage was expressed from the proximal aspect of the ulcer (dorsally over the 4th and 5th metatarsal). MICU was notified about the findings of gas on xray. Podiatry Attending discussed with MICU Attending about the importance of taking the patient to the OR immediately, however given his abn electrolyte (Potassium) and possibility of patient having arrythmia' s and increased glucose-- it was decided that we wait until patient has finished his drip and has labs have normalized. Re-evaluated in AM (12/1), pt vital signs have clinically improved. OR pushed back to evening due to emergent case in AM. CT confirms osteo of 5th MT.    Patient is now s/p right partial 5th ray resection packed open.  Abscess was noted in the OR and was drained.     Of note, Examination on 12/2/21 revealed wound on Left foot plantar aspect. +malodor. Xrays are negative for gas/OM.    Plan:  - Wound packed open with Dakin's soaked gauze, dressed with DSD, ABD and ACE wrap.   - Wrapped Left foot with wet to dry and DSD   - F/U vasc recs  - f/u OR final Cx- polymicrobial growth   - NWB to RLE  - rest of plan per primary team     Podiatry Following

## 2021-12-04 NOTE — PROGRESS NOTE ADULT - SUBJECTIVE AND OBJECTIVE BOX
Patient is a 46y old  Male who presents with a chief complaint of DKA (03 Dec 2021 16:39)      INTERVAL HPI/ OVERNIGHT EVENTS: Pt evaluated at bedside.       LABS                        9.7    9.52  )-----------( 497      ( 04 Dec 2021 07:51 )             28.9     12-04    131<L>  |  96  |  16  ----------------------------<  262<H>  3.8   |  22  |  1.38<H>    Ca    9.6      04 Dec 2021 07:51  Mg     1.9     12-04    TPro  7.5  /  Alb  2.5<L>  /  TBili  0.3  /  DBili  x   /  AST  8<L>  /  ALT  <5<L>  /  AlkPhos  149<H>  12-03        ICU Vital Signs Last 24 Hrs  T(C): 36.8 (04 Dec 2021 05:36), Max: 36.9 (03 Dec 2021 17:31)  T(F): 98.2 (04 Dec 2021 05:36), Max: 98.4 (03 Dec 2021 17:31)  HR: 87 (04 Dec 2021 05:36) (87 - 107)  BP: 130/76 (04 Dec 2021 05:36) (107/55 - 132/80)  BP(mean): --  ABP: --  ABP(mean): --  RR: 18 (04 Dec 2021 05:36) (18 - 18)  SpO2: 97% (04 Dec 2021 05:36) (97% - 100%)      RADIOLOGY    MICROBIOLOGY    PHYSICAL EXAM  Lower Extremity Focused  Vasc:  Derm:  Neuro:  MSK: Patient is a 46y old  Male who presents with a chief complaint of DKA (03 Dec 2021 16:39)      INTERVAL HPI/ OVERNIGHT EVENTS: Pt evaluated at bedside. MISSY o/n. Pt notes no new complaints at this time. Necrotic tissue present to right amputation site. No change to left TMA site wound.       LABS                        9.7    9.52  )-----------( 497      ( 04 Dec 2021 07:51 )             28.9     12-04    131<L>  |  96  |  16  ----------------------------<  262<H>  3.8   |  22  |  1.38<H>    Ca    9.6      04 Dec 2021 07:51  Mg     1.9     12-04    TPro  7.5  /  Alb  2.5<L>  /  TBili  0.3  /  DBili  x   /  AST  8<L>  /  ALT  <5<L>  /  AlkPhos  149<H>  12-03        ICU Vital Signs Last 24 Hrs  T(C): 36.8 (04 Dec 2021 05:36), Max: 36.9 (03 Dec 2021 17:31)  T(F): 98.2 (04 Dec 2021 05:36), Max: 98.4 (03 Dec 2021 17:31)  HR: 87 (04 Dec 2021 05:36) (87 - 107)  BP: 130/76 (04 Dec 2021 05:36) (107/55 - 132/80)  BP(mean): --  ABP: --  ABP(mean): --  RR: 18 (04 Dec 2021 05:36) (18 - 18)  SpO2: 97% (04 Dec 2021 05:36) (97% - 100%)      RADIOLOGY    MICROBIOLOGY    PHYSICAL EXAM  GEN: NAD, AAOx3, resting comfortably with pain controlled      LE Focused: CFT shows adequate perfusion b/l with no signs of ischemic compromise.  No strikethrough is appreciated on surgical dressings.  Dressings were dry, clean, and intact.  No neuromuscular deficits appreciated. Right foot wound has been left open on the lateral aspect s/p partial 5th ray amp. 2 incisions present dorsally (one over the 1st intermetatarsal space and 2nd over the 3rd intermetatarsal space)-- packed with gauze. Necrotic tissue present at amp site. Negative for purulent drainage at this time.      Wound at plantar aspect of L foot TMA site. + malodor. Hyperkeratotic skin present diffusely plantarly on L foot. Negative for purulent drainage, but undermining present. Negative for tunneling/ tracking.

## 2021-12-05 LAB
ALBUMIN SERPL ELPH-MCNC: 2.6 G/DL — LOW (ref 3.3–5)
ALP SERPL-CCNC: 149 U/L — HIGH (ref 40–120)
ALT FLD-CCNC: 6 U/L — LOW (ref 10–45)
ANION GAP SERPL CALC-SCNC: 13 MMOL/L — SIGNIFICANT CHANGE UP (ref 5–17)
AST SERPL-CCNC: 11 U/L — SIGNIFICANT CHANGE UP (ref 10–40)
BASOPHILS # BLD AUTO: 0 K/UL — SIGNIFICANT CHANGE UP (ref 0–0.2)
BASOPHILS NFR BLD AUTO: 0 % — SIGNIFICANT CHANGE UP (ref 0–2)
BILIRUB SERPL-MCNC: 0.2 MG/DL — SIGNIFICANT CHANGE UP (ref 0.2–1.2)
BUN SERPL-MCNC: 10 MG/DL — SIGNIFICANT CHANGE UP (ref 7–23)
CALCIUM SERPL-MCNC: 9.6 MG/DL — SIGNIFICANT CHANGE UP (ref 8.4–10.5)
CHLORIDE SERPL-SCNC: 97 MMOL/L — SIGNIFICANT CHANGE UP (ref 96–108)
CO2 SERPL-SCNC: 23 MMOL/L — SIGNIFICANT CHANGE UP (ref 22–31)
CREAT SERPL-MCNC: 1.26 MG/DL — SIGNIFICANT CHANGE UP (ref 0.5–1.3)
EOSINOPHIL # BLD AUTO: 0.11 K/UL — SIGNIFICANT CHANGE UP (ref 0–0.5)
EOSINOPHIL NFR BLD AUTO: 0.9 % — SIGNIFICANT CHANGE UP (ref 0–6)
GIANT PLATELETS BLD QL SMEAR: PRESENT — SIGNIFICANT CHANGE UP
GLUCOSE BLDC GLUCOMTR-MCNC: 117 MG/DL — HIGH (ref 70–99)
GLUCOSE BLDC GLUCOMTR-MCNC: 229 MG/DL — HIGH (ref 70–99)
GLUCOSE BLDC GLUCOMTR-MCNC: 267 MG/DL — HIGH (ref 70–99)
GLUCOSE BLDC GLUCOMTR-MCNC: 96 MG/DL — SIGNIFICANT CHANGE UP (ref 70–99)
GLUCOSE SERPL-MCNC: 215 MG/DL — HIGH (ref 70–99)
HCT VFR BLD CALC: 28.1 % — LOW (ref 39–50)
HGB BLD-MCNC: 9.3 G/DL — LOW (ref 13–17)
LYMPHOCYTES # BLD AUTO: 1.57 K/UL — SIGNIFICANT CHANGE UP (ref 1–3.3)
LYMPHOCYTES # BLD AUTO: 12.8 % — LOW (ref 13–44)
MAGNESIUM SERPL-MCNC: 1.6 MG/DL — SIGNIFICANT CHANGE UP (ref 1.6–2.6)
MANUAL SMEAR VERIFICATION: SIGNIFICANT CHANGE UP
MCHC RBC-ENTMCNC: 29.2 PG — SIGNIFICANT CHANGE UP (ref 27–34)
MCHC RBC-ENTMCNC: 33.1 GM/DL — SIGNIFICANT CHANGE UP (ref 32–36)
MCV RBC AUTO: 88.4 FL — SIGNIFICANT CHANGE UP (ref 80–100)
METAMYELOCYTES # FLD: 3.4 % — HIGH (ref 0–0)
MONOCYTES # BLD AUTO: 1.04 K/UL — HIGH (ref 0–0.9)
MONOCYTES NFR BLD AUTO: 8.5 % — SIGNIFICANT CHANGE UP (ref 2–14)
MYELOCYTES NFR BLD: 9.4 % — HIGH (ref 0–0)
NEUTROPHILS # BLD AUTO: 7.66 K/UL — HIGH (ref 1.8–7.4)
NEUTROPHILS NFR BLD AUTO: 59.8 % — SIGNIFICANT CHANGE UP (ref 43–77)
NEUTS BAND # BLD: 2.6 % — SIGNIFICANT CHANGE UP (ref 0–8)
PLAT MORPH BLD: ABNORMAL
PLATELET # BLD AUTO: 528 K/UL — HIGH (ref 150–400)
POTASSIUM SERPL-MCNC: 3.8 MMOL/L — SIGNIFICANT CHANGE UP (ref 3.5–5.3)
POTASSIUM SERPL-SCNC: 3.8 MMOL/L — SIGNIFICANT CHANGE UP (ref 3.5–5.3)
PROMYELOCYTES # FLD: 1.7 % — HIGH (ref 0–0)
PROT SERPL-MCNC: 7.6 G/DL — SIGNIFICANT CHANGE UP (ref 6–8.3)
RBC # BLD: 3.18 M/UL — LOW (ref 4.2–5.8)
RBC # FLD: 13.6 % — SIGNIFICANT CHANGE UP (ref 10.3–14.5)
RBC BLD AUTO: ABNORMAL
SODIUM SERPL-SCNC: 133 MMOL/L — LOW (ref 135–145)
SPHEROCYTES BLD QL SMEAR: SLIGHT — SIGNIFICANT CHANGE UP
VARIANT LYMPHS # BLD: 0.9 % — SIGNIFICANT CHANGE UP (ref 0–6)
WBC # BLD: 12.27 K/UL — HIGH (ref 3.8–10.5)
WBC # FLD AUTO: 12.27 K/UL — HIGH (ref 3.8–10.5)

## 2021-12-05 PROCEDURE — 99233 SBSQ HOSP IP/OBS HIGH 50: CPT | Mod: GC

## 2021-12-05 RX ORDER — MAGNESIUM SULFATE 500 MG/ML
2 VIAL (ML) INJECTION ONCE
Refills: 0 | Status: COMPLETED | OUTPATIENT
Start: 2021-12-05 | End: 2021-12-05

## 2021-12-05 RX ORDER — INSULIN LISPRO 100/ML
20 VIAL (ML) SUBCUTANEOUS
Refills: 0 | Status: DISCONTINUED | OUTPATIENT
Start: 2021-12-05 | End: 2021-12-06

## 2021-12-05 RX ORDER — INSULIN GLARGINE 100 [IU]/ML
50 INJECTION, SOLUTION SUBCUTANEOUS AT BEDTIME
Refills: 0 | Status: DISCONTINUED | OUTPATIENT
Start: 2021-12-05 | End: 2021-12-06

## 2021-12-05 RX ADMIN — ATORVASTATIN CALCIUM 20 MILLIGRAM(S): 80 TABLET, FILM COATED ORAL at 22:31

## 2021-12-05 RX ADMIN — Medication 40 MILLIEQUIVALENT(S): at 23:28

## 2021-12-05 RX ADMIN — Medication 16 UNIT(S): at 13:00

## 2021-12-05 RX ADMIN — Medication 5 MILLIGRAM(S): at 15:33

## 2021-12-05 RX ADMIN — Medication 6: at 13:00

## 2021-12-05 RX ADMIN — OXYCODONE HYDROCHLORIDE 5 MILLIGRAM(S): 5 TABLET ORAL at 18:18

## 2021-12-05 RX ADMIN — PANTOPRAZOLE SODIUM 40 MILLIGRAM(S): 20 TABLET, DELAYED RELEASE ORAL at 06:29

## 2021-12-05 RX ADMIN — Medication 20 UNIT(S): at 18:00

## 2021-12-05 RX ADMIN — Medication 50 GRAM(S): at 18:00

## 2021-12-05 RX ADMIN — PANTOPRAZOLE SODIUM 40 MILLIGRAM(S): 20 TABLET, DELAYED RELEASE ORAL at 18:01

## 2021-12-05 RX ADMIN — Medication 5 MILLIGRAM(S): at 06:29

## 2021-12-05 RX ADMIN — HEPARIN SODIUM 5000 UNIT(S): 5000 INJECTION INTRAVENOUS; SUBCUTANEOUS at 06:29

## 2021-12-05 RX ADMIN — Medication 4: at 08:59

## 2021-12-05 RX ADMIN — Medication 16 UNIT(S): at 08:59

## 2021-12-05 RX ADMIN — CEFTRIAXONE 100 MILLIGRAM(S): 500 INJECTION, POWDER, FOR SOLUTION INTRAMUSCULAR; INTRAVENOUS at 18:00

## 2021-12-05 RX ADMIN — Medication 5 MILLIGRAM(S): at 22:31

## 2021-12-05 RX ADMIN — INSULIN GLARGINE 50 UNIT(S): 100 INJECTION, SOLUTION SUBCUTANEOUS at 22:31

## 2021-12-05 NOTE — PROGRESS NOTE ADULT - SUBJECTIVE AND OBJECTIVE BOX
Patient is a 46y old  Male who presents with a chief complaint of DKA (05 Dec 2021 14:22)      INTERVAL HPI/ OVERNIGHT EVENTS: Pt evaluated at bedside. MISSY o/n. Pt continues to have no pain at right amputation site, wound looks stable at this time with no purulent drainage. No change to left TMA site wound. Pt has no new complaints at this time.      LABS                        9.3    12.27 )-----------( 528      ( 05 Dec 2021 09:31 )             28.1     12-05    133<L>  |  97  |  10  ----------------------------<  215<H>  3.8   |  23  |  1.26    Ca    9.6      05 Dec 2021 09:31  Mg     1.6     12-05    TPro  7.6  /  Alb  2.6<L>  /  TBili  0.2  /  DBili  x   /  AST  11  /  ALT  6<L>  /  AlkPhos  149<H>  12-05        ICU Vital Signs Last 24 Hrs  T(C): 37 (05 Dec 2021 06:24), Max: 37 (05 Dec 2021 06:24)  T(F): 98.6 (05 Dec 2021 06:24), Max: 98.6 (05 Dec 2021 06:24)  HR: 89 (05 Dec 2021 06:24) (88 - 95)  BP: 102/69 (05 Dec 2021 06:24) (102/69 - 134/83)  BP(mean): --  ABP: --  ABP(mean): --  RR: 18 (05 Dec 2021 06:24) (18 - 18)  SpO2: 98% (05 Dec 2021 06:24) (98% - 100%)      RADIOLOGY    MICROBIOLOGY    PHYSICAL EXAM  GEN: NAD, AAOx3, resting comfortably with pain controlled      LE Focused: CFT shows adequate perfusion b/l with no signs of ischemic compromise.  No strikethrough is appreciated on surgical dressings.  Dressings were dry, clean, and intact.  No neuromuscular deficits appreciated. Right foot wound has been left open on the lateral aspect s/p partial 5th ray amp. 2 incisions present dorsally (one over the 1st intermetatarsal space and 2nd over the 3rd intermetatarsal space)-- packed with gauze. Granular and fibrous tissue present at amp site. Negative for purulent drainage at this time.      Wound at plantar aspect of L foot TMA site. + mild malodor. Hyperkeratotic skin present diffusely plantarly on L foot. Negative for purulent drainage, but undermining present. Negative for tunneling/ tracking.

## 2021-12-05 NOTE — PROGRESS NOTE ADULT - PROBLEM SELECTOR PLAN 1
Pt w/ known h/o DKA and h/o amputation of L toes 11/2020 2/2 diabetic neuropathy.  - XR R foot: relative osteopenia of the fifth ray, equivocal for sequelae of acute osteomyelitis.   -CT right foot w/ contrast showing extensive soft tissue defect with air infiltration extending into the fifth metatarsal, proximal and middle phalanges of the fifth digit c/w osteomyelitis   -CT left foot - no abscess. evidence of cellulitis; suggest MRI. per podiatry hold off on more imaging for left foot  - c/w ceftriaxone 2g q24  - s/p partial R foot amputation 12/1 by podiatry  -wound care per podiatry   - per podiatry wound probed plantar proximally towards calcaneus, pt may require BKA. planning to do further debridement of right foot monday   - NWB to RLE  -BCs 12/1 NGTD  -deep wound cultures from surgical site 12/1 growing group B strep and proteus   -PT consulted, recs [facility]  -arterial duplex of RLE showing mild PAD without significant stenosis   -vascular following, will consider vascular intervention if no improvement with further debridement by podiatry

## 2021-12-05 NOTE — PROGRESS NOTE ADULT - PROBLEM SELECTOR PLAN 6
Patient with Na of 131 today which is improved from 129, likely hypovolemic as urine lytes showing pre-renal picture and improved with IV fluids and PO intake   -continue to trend      #SANDY  -patient with Cr elevated at 1.38; no history of CKD  -will continue to trend, if increases today will order for urine lytes and UA  -patient producing urine

## 2021-12-05 NOTE — PROGRESS NOTE ADULT - ATTENDING COMMENTS
Patient was seen and examined at bedside  -NPO s/p midnight for OR w/ podiatry tomorrow  -Will d/w Endo - as will need insulin adjustment

## 2021-12-05 NOTE — PROGRESS NOTE ADULT - ASSESSMENT
47yo M PMH IDDM (diagnosed 11/2020) presented from [facility] complaining of chest pain. Reports polyuria, polydipsia and decreased appetite x1 week. Of note, pt was diagnosed w/ DM last year 11/2020 at which time he was admitted for DKA and all toes on his L foot were amputated.    Podiatry was consulted for the management of the right foot infected diabetic ulcer. Pt was noted to have gas on xray near the 5th metatarsal, although the xray read did not mention it. Right foot is malodorous and 5ccs of purulent drainage was expressed from the proximal aspect of the ulcer (dorsally over the 4th and 5th metatarsal). MICU was notified about the findings of gas on xray. Podiatry Attending discussed with MICU Attending about the importance of taking the patient to the OR immediately, however given his abn electrolyte (Potassium) and possibility of patient having arrythmia' s and increased glucose-- it was decided that we wait until patient has finished his drip and has labs have normalized. Re-evaluated in AM (12/1), pt vital signs have clinically improved. OR pushed back to evening due to emergent case in AM. CT confirms osteo of 5th MT.    Patient is now s/p right partial 5th ray resection packed open.  Abscess was noted in the OR and was drained.     Of note, Examination on 12/2/21 revealed wound on Left foot plantar aspect. +malodor. Xrays are negative for gas/OM.    Plan:  - Wound packed open with Dakin's soaked gauze, dressed with DSD, ABD and ACE wrap.   - Wrapped Left foot with wet to dry and DSD   - F/U vasc recs  - OR final Cx- polymicrobial growth   - NWB to RLE  - Plan for Wound Vac and recc dispo to rehab.   - rest of plan per primary team     Podiatry Following

## 2021-12-05 NOTE — PROGRESS NOTE ADULT - SUBJECTIVE AND OBJECTIVE BOX
OVERNIGHT EVENTS: NAEO    SUBJECTIVE / INTERVAL HPI: Patient seen and examined at bedside. Reports he is feeling ok this morning and his foot pain is controlled with current pain regimen. Still reporting chest pain once in a while when eating fruit. Also still with intermittent hiccuping. Otherwise has no complaints and is in good spirits. Patient denying SOB, palpitations, cough. Patient denies fever, chills, HA, Dizziness, change in vision/hearing, N/V, abdominal pain, diarrhea, constipation, hematochezia/melena, dysuria, hematuria.    Remaining ROS negative       PHYSICAL EXAM:    General: sitting up in bed, NAD   HEENT: NC/AT; PERRL, anicteric sclera; MMM  Neck: supple, no JVD   Cardiovascular: +S1/S2, RRR, no murmurs   Respiratory: CTA B/L; no W/R/R. no increased WOB.   Gastrointestinal: soft, NT/ND; +BSx4  Extremities: WWP; no edema, clubbing or cyanosis. L foot s/p toes amputated, wrapped in gauze. RLE wrapped in gauze and ACE.   Vascular: 2+ radial pulses B/L  Neurological: AAOx3; decreased sensation in b/l LEs. 5/5 muscle strength.  Dermatologic: no appreciable wounds or damage to the skin. dry flaking skin on b/l shins.    VITAL SIGNS:  Vital Signs Last 24 Hrs  T(C): 37 (05 Dec 2021 06:24), Max: 37 (05 Dec 2021 06:24)  T(F): 98.6 (05 Dec 2021 06:24), Max: 98.6 (05 Dec 2021 06:24)  HR: 89 (05 Dec 2021 06:24) (88 - 95)  BP: 102/69 (05 Dec 2021 06:24) (102/69 - 134/83)  BP(mean): --  RR: 18 (05 Dec 2021 06:24) (18 - 18)  SpO2: 98% (05 Dec 2021 06:24) (98% - 100%)      MEDICATIONS:  MEDICATIONS  (STANDING):  atorvastatin 20 milliGRAM(s) Oral at bedtime  baclofen 5 milliGRAM(s) Oral three times a day  cefTRIAXone   IVPB 2000 milliGRAM(s) IV Intermittent every 24 hours  Dakins Solution - 1/4 Strength 1 Application(s) Topical daily  dextrose 40% Gel 15 Gram(s) Oral once  dextrose 5%. 1000 milliLiter(s) (100 mL/Hr) IV Continuous <Continuous>  dextrose 50% Injectable 50 milliLiter(s) IV Push every 15 minutes  glucagon  Injectable 1 milliGRAM(s) IntraMuscular once  heparin   Injectable 5000 Unit(s) SubCutaneous every 8 hours  insulin glargine Injectable (LANTUS) 56 Unit(s) SubCutaneous at bedtime  insulin lispro (ADMELOG) corrective regimen sliding scale   SubCutaneous Before meals and at bedtime  insulin lispro Injectable (ADMELOG) 16 Unit(s) SubCutaneous three times a day before meals  pantoprazole    Tablet 40 milliGRAM(s) Oral two times a day  potassium chloride    Tablet ER 40 milliEquivalent(s) Oral once    MEDICATIONS  (PRN):  acetaminophen     Tablet .. 650 milliGRAM(s) Oral every 6 hours PRN Temp greater or equal to 38C (100.4F), Mild Pain (1 - 3)  calcium carbonate    500 mG (Tums) Chewable 1 Tablet(s) Chew three times a day PRN Dyspepsia  oxyCODONE    IR 5 milliGRAM(s) Oral every 6 hours PRN Moderate Pain (4 - 6)  simethicone 80 milliGRAM(s) Chew two times a day PRN Heartburn      ALLERGIES:  Allergies    No Known Allergies    Intolerances        LABS:                        9.7    9.52  )-----------( 497      ( 04 Dec 2021 07:51 )             28.9     12-04    131<L>  |  96  |  16  ----------------------------<  262<H>  3.8   |  22  |  1.38<H>    Ca    9.6      04 Dec 2021 07:51  Mg     1.9     12-04          CAPILLARY BLOOD GLUCOSE      POCT Blood Glucose.: 229 mg/dL (05 Dec 2021 08:41)      RADIOLOGY & ADDITIONAL TESTS: Reviewed.

## 2021-12-05 NOTE — PROGRESS NOTE ADULT - PROBLEM SELECTOR PLAN 7
F: none  E: replete PRN  N: consistent carb   GI ppx: PPI  DVT ppx: heparin  Code status: FULL CODE  Dispo: [facility]

## 2021-12-05 NOTE — PROGRESS NOTE ADULT - PROBLEM SELECTOR PLAN 2
Patient initially admitted for DKA with glucose 912 on admission w/ AG 31, K 6.8, bicarb 11, BHB 6.1. Pt diagnosed w/ DM last year 11/2020 when he was hospitalized for DKA, also had R toe amputation during that hospitalization. Reportedly takes insulin at home but only intermittently compliant. Reportedly takes basaglar 40u qhs, novolog 1u TID, but prescribed 50u qhs, 15u TID. Meds last picked up 8/2021.  - off insulin drip , DKA resolved   - A1c 14.3%  -c/w SS for now   -per endocrine lantus increased to 56 units at night, lispro 16 units pre-meal TID    -c/w lispro moderate dose sliding scale four times daily with meals and at bedtime  -consider restarting insulin drip if fingersticks start to run high on current regimen   -monitor fingerstick glucose readings, goal 100-180

## 2021-12-05 NOTE — PROGRESS NOTE ADULT - ATTENDING COMMENTS
This is a 46 year old man h/o poorly controlled T2DM with right foot osteomyelitis s/p TMA who was in DKA during this admission. Has been on basal bolus insulin. FSGs elevated this morning. He is again in good spirits today and very humorous. He would strongly like to have french fries instead of mashed potatoes - counseled that this would not be in line with the hospital's carb consistent menu. He will be NPO tonight for debridement tomorrow. Agree with Dr. Amador's recommendations to decrease lantus to 50u qhs and increase Aspart to 20u qAc.    Florecita Cullen MD  Endocrinology Attending    I spent 35 minutes on the total encounter. More than 50% of the visit was spent counseling and/or coordinating care by me, the attending physician.

## 2021-12-05 NOTE — PROGRESS NOTE ADULT - SUBJECTIVE AND OBJECTIVE BOX
INTERVAL HPI/OVERNIGHT EVENTS:    Patient is a 46y old  Male who presents with a chief complaint of DKA (05 Dec 2021 09:51)  MISSY overnight.   He repots good appetite.  He reports he wants french fries for dinner.    pt will be NPO for OR tmr.    FSG & Insulin received:  Yesterday:  pre-dinner fs  nutritional lispro  held  bedtime fs  lantus  56 units +  2  units lispro SS    Today:  pre-breakfast fs  nutritional lispro  16 units+4    units reg SS  pre-lunch fs  nutritional lispro  16 units+ 4 units reg  SS  Pt reports the following symptoms:    CONSTITUTIONAL:   good appetite  EYES:  Negative  blurry vision or double vision  CARDIOVASCULAR:  Negative for chest pain or palpitations  RESPIRATORY:  Negative for cough, wheezing, or SOB   GASTROINTESTINAL:  Negative for nausea, vomiting, diarrhea, constipation, or abdominal pain  GENITOURINARY:  Negative frequency, urgency or dysuria  NEUROLOGIC:  No headache, confusion    MEDICATIONS  (STANDING):  atorvastatin 20 milliGRAM(s) Oral at bedtime  baclofen 5 milliGRAM(s) Oral three times a day  cefTRIAXone   IVPB 2000 milliGRAM(s) IV Intermittent every 24 hours  Dakins Solution - 1/4 Strength 1 Application(s) Topical daily  dextrose 40% Gel 15 Gram(s) Oral once  dextrose 5%. 1000 milliLiter(s) (100 mL/Hr) IV Continuous <Continuous>  dextrose 50% Injectable 50 milliLiter(s) IV Push every 15 minutes  glucagon  Injectable 1 milliGRAM(s) IntraMuscular once  insulin glargine Injectable (LANTUS) 50 Unit(s) SubCutaneous at bedtime  insulin lispro (ADMELOG) corrective regimen sliding scale   SubCutaneous Before meals and at bedtime  insulin lispro Injectable (ADMELOG) 20 Unit(s) SubCutaneous three times a day before meals  magnesium sulfate  IVPB 2 Gram(s) IV Intermittent once  pantoprazole    Tablet 40 milliGRAM(s) Oral two times a day  potassium chloride    Tablet ER 40 milliEquivalent(s) Oral once    MEDICATIONS  (PRN):  acetaminophen     Tablet .. 650 milliGRAM(s) Oral every 6 hours PRN Temp greater or equal to 38C (100.4F), Mild Pain (1 - 3)  calcium carbonate    500 mG (Tums) Chewable 1 Tablet(s) Chew three times a day PRN Dyspepsia  oxyCODONE    IR 5 milliGRAM(s) Oral every 6 hours PRN Moderate Pain (4 - 6)  simethicone 80 milliGRAM(s) Chew two times a day PRN Heartburn      Past medical history reviewed  Family history reviewed  Social history reviewed    PHYSICAL EXAM  Vital Signs Last 24 Hrs  T(C): 37 (05 Dec 2021 06:24), Max: 37 (05 Dec 2021 06:24)  T(F): 98.6 (05 Dec 2021 06:24), Max: 98.6 (05 Dec 2021 06:24)  HR: 89 (05 Dec 2021 06:24) (88 - 95)  BP: 102/69 (05 Dec 2021 06:24) (102/69 - 134/83)  BP(mean): --  RR: 18 (05 Dec 2021 06:24) (18 - 18)  SpO2: 98% (05 Dec 2021 06:24) (98% - 100%)      Constitutional:  in NAD.   HEENT:  no proptosis or lid retraction  Neck: no thyromegaly or palpable thyroid nodules   Respiratory: lungs CTAB.  Cardiovascular: regular rhythm, normal S1 and S2  GI: soft, NT/ND, no masses/HSM appreciated.  Neurology: no tremors, DTR 2+  Psychiatric: AAO x 3      LABS:                        9.3    12.27 )-----------( 528      ( 05 Dec 2021 09:31 )             28.1     12    133<L>  |  97  |  10  ----------------------------<  215<H>  3.8   |  23  |  1.26    Ca    9.6      05 Dec 2021 09:31  Mg     1.6     12    TPro  7.6  /  Alb  2.6<L>  /  TBili  0.2  /  DBili  x   /  AST  11  /  ALT  6<L>  /  AlkPhos  149<H>              HbA1C: 14.3 % ( @ 19:01)      CAPILLARY BLOOD GLUCOSE      POCT Blood Glucose.: 267 mg/dL (05 Dec 2021 12:10)  POCT Blood Glucose.: 229 mg/dL (05 Dec 2021 08:41)  POCT Blood Glucose.: 179 mg/dL (04 Dec 2021 23:01)  POCT Blood Glucose.: 75 mg/dL (04 Dec 2021 17:29)      Cholesterol, Serum: 143 mg/dL (21 @ 07:47)  HDL Cholesterol, Serum: 31 mg/dL (21 @ 07:47)  Triglycerides, Serum: 147 mg/dL (21 @ 07:47)      A/P:46y male poorly controlled diabetic with right foot osteomyelitis admitted with DKA and in need of amputation.   DKA resolved   A1C: 14.3%  Wt: 77.6kg  Cr: 1.12  GFR: 91  Home regimen: 50 units Basiglar, 15 units Novolog       s/p Partial amputation of R 5th toe yesterday. Deep wound cultures from surgery growing group B strep, enterococcus, proteus.  per podiatry infection seeding to calcaneous. May go for washout of left foot Monday.     1.  DM type 2 - uncontrolled   Please give Lantus to  50 units at night., pt will be NPO for OR tmr.   Please give Lispro  20   units before each meal.  please discontinue regular insulin Q6h  Please c/w  Lispro moderate dose sliding scale four times daily with meals and at bedtime       Pt's fingerstick glucose goal is 100-180    Will continue to monitor       Pt can follow up at discharge with [facility] by calling  to make an appointment.    case seen and discussed with Dr. Cullen and update primary team

## 2021-12-06 ENCOUNTER — TRANSCRIPTION ENCOUNTER (OUTPATIENT)
Age: 46
End: 2021-12-06

## 2021-12-06 LAB
ANION GAP SERPL CALC-SCNC: 11 MMOL/L — SIGNIFICANT CHANGE UP (ref 5–17)
BUN SERPL-MCNC: 9 MG/DL — SIGNIFICANT CHANGE UP (ref 7–23)
CALCIUM SERPL-MCNC: 9.3 MG/DL — SIGNIFICANT CHANGE UP (ref 8.4–10.5)
CHLORIDE SERPL-SCNC: 99 MMOL/L — SIGNIFICANT CHANGE UP (ref 96–108)
CO2 SERPL-SCNC: 24 MMOL/L — SIGNIFICANT CHANGE UP (ref 22–31)
CREAT SERPL-MCNC: 1.39 MG/DL — HIGH (ref 0.5–1.3)
CULTURE RESULTS: SIGNIFICANT CHANGE UP
CULTURE RESULTS: SIGNIFICANT CHANGE UP
FERRITIN SERPL-MCNC: 1807 NG/ML — HIGH (ref 30–400)
GLUCOSE BLDC GLUCOMTR-MCNC: 103 MG/DL — HIGH (ref 70–99)
GLUCOSE BLDC GLUCOMTR-MCNC: 105 MG/DL — HIGH (ref 70–99)
GLUCOSE BLDC GLUCOMTR-MCNC: 132 MG/DL — HIGH (ref 70–99)
GLUCOSE BLDC GLUCOMTR-MCNC: 145 MG/DL — HIGH (ref 70–99)
GLUCOSE BLDC GLUCOMTR-MCNC: 183 MG/DL — HIGH (ref 70–99)
GLUCOSE SERPL-MCNC: 82 MG/DL — SIGNIFICANT CHANGE UP (ref 70–99)
HAPTOGLOB SERPL-MCNC: 447 MG/DL — HIGH (ref 34–200)
HCT VFR BLD CALC: 26.7 % — LOW (ref 39–50)
HGB BLD-MCNC: 8.7 G/DL — LOW (ref 13–17)
IRON SATN MFR SERPL: 17 % — SIGNIFICANT CHANGE UP (ref 16–55)
IRON SATN MFR SERPL: 37 UG/DL — LOW (ref 45–165)
LDH SERPL L TO P-CCNC: 314 U/L — HIGH (ref 50–242)
MAGNESIUM SERPL-MCNC: 1.9 MG/DL — SIGNIFICANT CHANGE UP (ref 1.6–2.6)
MCHC RBC-ENTMCNC: 28.6 PG — SIGNIFICANT CHANGE UP (ref 27–34)
MCHC RBC-ENTMCNC: 32.6 GM/DL — SIGNIFICANT CHANGE UP (ref 32–36)
MCV RBC AUTO: 87.8 FL — SIGNIFICANT CHANGE UP (ref 80–100)
NRBC # BLD: 0 /100 WBCS — SIGNIFICANT CHANGE UP (ref 0–0)
PHOSPHATE SERPL-MCNC: 4.4 MG/DL — SIGNIFICANT CHANGE UP (ref 2.5–4.5)
PLATELET # BLD AUTO: 502 K/UL — HIGH (ref 150–400)
POTASSIUM SERPL-MCNC: 4.1 MMOL/L — SIGNIFICANT CHANGE UP (ref 3.5–5.3)
POTASSIUM SERPL-SCNC: 4.1 MMOL/L — SIGNIFICANT CHANGE UP (ref 3.5–5.3)
RBC # BLD: 3.04 M/UL — LOW (ref 4.2–5.8)
RBC # FLD: 13.7 % — SIGNIFICANT CHANGE UP (ref 10.3–14.5)
RETICS #: 35.5 K/UL — SIGNIFICANT CHANGE UP (ref 25–125)
RETICS/RBC NFR: 1.2 % — SIGNIFICANT CHANGE UP (ref 0.5–2.5)
SODIUM SERPL-SCNC: 134 MMOL/L — LOW (ref 135–145)
SPECIMEN SOURCE: SIGNIFICANT CHANGE UP
SPECIMEN SOURCE: SIGNIFICANT CHANGE UP
TIBC SERPL-MCNC: 220 UG/DL — SIGNIFICANT CHANGE UP (ref 220–430)
TRANSFERRIN SERPL-MCNC: 126 MG/DL — LOW (ref 200–360)
UIBC SERPL-MCNC: 183 UG/DL — SIGNIFICANT CHANGE UP (ref 110–370)
WBC # BLD: 9.74 K/UL — SIGNIFICANT CHANGE UP (ref 3.8–10.5)
WBC # FLD AUTO: 9.74 K/UL — SIGNIFICANT CHANGE UP (ref 3.8–10.5)

## 2021-12-06 PROCEDURE — 99231 SBSQ HOSP IP/OBS SF/LOW 25: CPT | Mod: GC

## 2021-12-06 PROCEDURE — 99232 SBSQ HOSP IP/OBS MODERATE 35: CPT | Mod: GC

## 2021-12-06 PROCEDURE — 99232 SBSQ HOSP IP/OBS MODERATE 35: CPT

## 2021-12-06 RX ORDER — INSULIN GLARGINE 100 [IU]/ML
45 INJECTION, SOLUTION SUBCUTANEOUS AT BEDTIME
Refills: 0 | Status: DISCONTINUED | OUTPATIENT
Start: 2021-12-06 | End: 2021-12-07

## 2021-12-06 RX ORDER — SODIUM CHLORIDE 9 MG/ML
1000 INJECTION INTRAMUSCULAR; INTRAVENOUS; SUBCUTANEOUS
Refills: 0 | Status: DISCONTINUED | OUTPATIENT
Start: 2021-12-06 | End: 2021-12-07

## 2021-12-06 RX ORDER — INSULIN LISPRO 100/ML
17 VIAL (ML) SUBCUTANEOUS
Refills: 0 | Status: DISCONTINUED | OUTPATIENT
Start: 2021-12-06 | End: 2021-12-07

## 2021-12-06 RX ORDER — PANTOPRAZOLE SODIUM 20 MG/1
40 TABLET, DELAYED RELEASE ORAL
Refills: 0 | Status: DISCONTINUED | OUTPATIENT
Start: 2021-12-07 | End: 2021-12-07

## 2021-12-06 RX ORDER — HEPARIN SODIUM 5000 [USP'U]/ML
5000 INJECTION INTRAVENOUS; SUBCUTANEOUS EVERY 8 HOURS
Refills: 0 | Status: DISCONTINUED | OUTPATIENT
Start: 2021-12-06 | End: 2021-12-07

## 2021-12-06 RX ADMIN — HEPARIN SODIUM 5000 UNIT(S): 5000 INJECTION INTRAVENOUS; SUBCUTANEOUS at 22:21

## 2021-12-06 RX ADMIN — Medication 20 UNIT(S): at 13:34

## 2021-12-06 RX ADMIN — Medication 20 UNIT(S): at 09:19

## 2021-12-06 RX ADMIN — Medication 20 UNIT(S): at 18:08

## 2021-12-06 RX ADMIN — SODIUM CHLORIDE 80 MILLILITER(S): 9 INJECTION INTRAMUSCULAR; INTRAVENOUS; SUBCUTANEOUS at 10:32

## 2021-12-06 RX ADMIN — ATORVASTATIN CALCIUM 20 MILLIGRAM(S): 80 TABLET, FILM COATED ORAL at 22:22

## 2021-12-06 RX ADMIN — Medication 2: at 18:07

## 2021-12-06 RX ADMIN — HEPARIN SODIUM 5000 UNIT(S): 5000 INJECTION INTRAVENOUS; SUBCUTANEOUS at 13:33

## 2021-12-06 RX ADMIN — CEFTRIAXONE 100 MILLIGRAM(S): 500 INJECTION, POWDER, FOR SOLUTION INTRAMUSCULAR; INTRAVENOUS at 16:48

## 2021-12-06 RX ADMIN — INSULIN GLARGINE 45 UNIT(S): 100 INJECTION, SOLUTION SUBCUTANEOUS at 22:22

## 2021-12-06 NOTE — PROGRESS NOTE ADULT - SUBJECTIVE AND OBJECTIVE BOX
INTERVAL HPI/OVERNIGHT EVENTS: MISSY. Inquiring about R foot wound care and weight bearing status.     CONSTITUTIONAL:  Negative fever or chills, feels well, good appetite  EYES:  Negative  blurry vision or double vision  CARDIOVASCULAR:  Negative for chest pain or palpitations  RESPIRATORY:  Negative for cough, wheezing, or SOB   GASTROINTESTINAL:  Negative for nausea, vomiting, diarrhea, constipation, or abdominal pain  GENITOURINARY:  Negative frequency, urgency or dysuria  NEUROLOGIC:  No headache, confusion, dizziness, lightheadedness      ANTIBIOTICS/RELEVANT:    MEDICATIONS  (STANDING):  atorvastatin 20 milliGRAM(s) Oral at bedtime  cefTRIAXone   IVPB 2000 milliGRAM(s) IV Intermittent every 24 hours  Dakins Solution - 1/4 Strength 1 Application(s) Topical daily  dextrose 40% Gel 15 Gram(s) Oral once  dextrose 5%. 1000 milliLiter(s) (100 mL/Hr) IV Continuous <Continuous>  dextrose 50% Injectable 50 milliLiter(s) IV Push every 15 minutes  glucagon  Injectable 1 milliGRAM(s) IntraMuscular once  heparin   Injectable 5000 Unit(s) SubCutaneous every 8 hours  insulin glargine Injectable (LANTUS) 50 Unit(s) SubCutaneous at bedtime  insulin lispro (ADMELOG) corrective regimen sliding scale   SubCutaneous Before meals and at bedtime  insulin lispro Injectable (ADMELOG) 20 Unit(s) SubCutaneous three times a day before meals  sodium chloride 0.9%. 1000 milliLiter(s) (80 mL/Hr) IV Continuous <Continuous>    MEDICATIONS  (PRN):  acetaminophen     Tablet .. 650 milliGRAM(s) Oral every 6 hours PRN Temp greater or equal to 38C (100.4F), Mild Pain (1 - 3)  calcium carbonate    500 mG (Tums) Chewable 1 Tablet(s) Chew three times a day PRN Dyspepsia  oxyCODONE    IR 5 milliGRAM(s) Oral every 6 hours PRN Moderate Pain (4 - 6)  simethicone 80 milliGRAM(s) Chew two times a day PRN Heartburn        Vital Signs Last 24 Hrs  T(C): 36.8 (06 Dec 2021 10:07), Max: 36.9 (06 Dec 2021 06:09)  T(F): 98.2 (06 Dec 2021 10:07), Max: 98.5 (06 Dec 2021 06:09)  HR: 82 (06 Dec 2021 10:07) (82 - 97)  BP: 119/78 (06 Dec 2021 10:07) (100/60 - 119/78)  BP(mean): 82 (05 Dec 2021 22:23) (82 - 82)  RR: 18 (06 Dec 2021 10:07) (16 - 18)  SpO2: 98% (06 Dec 2021 10:07) (96% - 98%)    PHYSICAL EXAM:  Constitutional: NAD  Eyes: LUIS DANIEL, EOMI  Ear/Nose/Throat: no oral lesion, no sinus tenderness on percussion	  Neck: no JVD, no lymphadenopathy, supple  Respiratory: CTA dunia  Cardiovascular: S1S2 RRR, no murmurs  Gastrointestinal:soft, (+) BS, no HSM  Extremities:no e/e/c  Vascular: DP Pulse:	right normal; left normal      LABS:                        8.7    9.74  )-----------( 502      ( 06 Dec 2021 06:58 )             26.7     12-06    134<L>  |  99  |  9   ----------------------------<  82  4.1   |  24  |  1.39<H>    Ca    9.3      06 Dec 2021 06:58  Phos  4.4     12-06  Mg     1.9     12-06    TPro  7.6  /  Alb  2.6<L>  /  TBili  0.2  /  DBili  x   /  AST  11  /  ALT  6<L>  /  AlkPhos  149<H>  12-05          MICROBIOLOGY: reviewed    RADIOLOGY & ADDITIONAL STUDIES: reviewed

## 2021-12-06 NOTE — PROGRESS NOTE ADULT - ASSESSMENT
45 yo male with uncontrolled DM A1c 14.3, hx L toe amputations/skin graft 2020, now p/w DKA in setting of R DFI/5th MT osteomyelitis s/p partial 5th Ray amputation (+purulence and malodor in OR) with cultures growing GBS as well as rare Enterococcus and Proteus; suspect GBS to be the primary pathogen here.   - ask podiatry address patient's concerns re: R foot wound care, weight bearing status, and if any plan for L foot intervention  - continue ceftriaxone 2g IV q24h for 6 week course through 1/10/22  - weekly CBC, CMP, ESR, CRP to be faxed to [phone]  - PICC (single lumen)    Will arrange telephonic f/u with me in early January    d/w primary team    Please reconsult with ?    ID Team 2

## 2021-12-06 NOTE — CHART NOTE - NSCHARTNOTEFT_GEN_A_CORE
Admitting Diagnosis:   Patient is a 46y old  Male who presents with a chief complaint of DKA (06 Dec 2021 14:20)      PAST MEDICAL & SURGICAL HISTORY:  Diabetes mellitus      Current Nutrition Order:  Diet, Consistent Carbohydrate/No Snacks (12-06-21 @ 08:21)      PO Intake: Good (%) [   ]  Fair (50-75%) [  X ] Poor (<25%) [   ]    GI Issues: Verbalizes increase in reflux  Denies N/V/C/D  +BM 12/6    Pain: Denies pain/ discomfort    Skin Integrity:   Partial ray amputation of right 5th metatarsal  Diabetic foot ulcer - Unstagable diabetic ulcer to right foot  ACE wraps    Labs:   12-06    134<L>  |  99  |  9   ----------------------------<  82  4.1   |  24  |  1.39<H>    Ca    9.3      06 Dec 2021 06:58  Phos  4.4     12-06  Mg     1.9     12-06    TPro  7.6  /  Alb  2.6<L>  /  TBili  0.2  /  DBili  x   /  AST  11  /  ALT  6<L>  /  AlkPhos  149<H>  12-05    CAPILLARY BLOOD GLUCOSE      POCT Blood Glucose.: 105 mg/dL (06 Dec 2021 13:14)  POCT Blood Glucose.: 145 mg/dL (06 Dec 2021 12:01)  POCT Blood Glucose.: 103 mg/dL (06 Dec 2021 08:48)  POCT Blood Glucose.: 96 mg/dL (05 Dec 2021 22:17)  POCT Blood Glucose.: 117 mg/dL (05 Dec 2021 17:30)      Medications:  MEDICATIONS  (STANDING):  atorvastatin 20 milliGRAM(s) Oral at bedtime  cefTRIAXone   IVPB 2000 milliGRAM(s) IV Intermittent every 24 hours  Dakins Solution - 1/4 Strength 1 Application(s) Topical daily  dextrose 40% Gel 15 Gram(s) Oral once  dextrose 5%. 1000 milliLiter(s) (100 mL/Hr) IV Continuous <Continuous>  dextrose 50% Injectable 50 milliLiter(s) IV Push every 15 minutes  glucagon  Injectable 1 milliGRAM(s) IntraMuscular once  heparin   Injectable 5000 Unit(s) SubCutaneous every 8 hours  insulin glargine Injectable (LANTUS) 50 Unit(s) SubCutaneous at bedtime  insulin lispro (ADMELOG) corrective regimen sliding scale   SubCutaneous Before meals and at bedtime  insulin lispro Injectable (ADMELOG) 20 Unit(s) SubCutaneous three times a day before meals  sodium chloride 0.9%. 1000 milliLiter(s) (80 mL/Hr) IV Continuous <Continuous>    MEDICATIONS  (PRN):  acetaminophen     Tablet .. 650 milliGRAM(s) Oral every 6 hours PRN Temp greater or equal to 38C (100.4F), Mild Pain (1 - 3)  calcium carbonate    500 mG (Tums) Chewable 1 Tablet(s) Chew three times a day PRN Dyspepsia  oxyCODONE    IR 5 milliGRAM(s) Oral every 6 hours PRN Moderate Pain (4 - 6)  simethicone 80 milliGRAM(s) Chew two times a day PRN Heartburn      Anthropometrics:  Height: 80"   Weight: 171 lbs,   IBW: 226 lbs+/-10%,   %IBW: 75 %,   BMI: 18.8    Weight Change: No new weight obtained. Please continue to trend weights weekly.    Nutrition Focused Physical Exam: Completed [ X ] mild muscle depletion in triceps area.    Estimated energy needs: %IBW=76; ABW used to calculate estimated needs d/t low BMI.  Adjusted for sepsis.  Calories: 30-35 kcal/kg---> 8716-3932 kcal/d  Protein: 1.2-1.4 g/kg---> .5 g/d  Fluid: 25-30 ml/kg---> 2707-4824 ml/d    Subjective:   45 y/o male with PMHx of poorly controlled IDDM and diabetic neuropathy BIBEMS for CP, found to have DKA, admitted to MICU for insulin gtt and severe sepsis 2/2 OM of R foot. Anion gap closed x2 as of 12/01, off Insulin gtt, now s/p partial right toe amputation being treated for OM of right foot.    Pt seen in [facility], currently on CSTHO diet, reports appetite is fait-good. Tolerates 50-75% of overal meals. Pt requesting few CHO food allowance such as baked fried at times. RD allowed 1/week depending on BG. Continue to educate PT on extensice CHO education. Pt aware of non-starchy food groups, Reports start getting familiar with healthy snacks and green leafy vegetables such as celery+PB, carlie greens, spinach, string beans. RD referred pt to outpatient clinisc for further diet education. Pt has been consuming water, non-sugary beverages. Pt was receptive, able to tech back and repeat. Expect fair compliance. Denies N/V/C/D, complaining of increase reflux at times. +BM 12/6. Please see additional nutrition recs below.    Previous Nutrition Diagnosis:   Food & Nutrition Related Knowledge Deficit RT lacking prior education, noncompliance AEB DKA, A1C 14.3.    Active [ X  ]  Resolved [   ]    Goal: Pt to meet >75% EER with good tolerance. Compliance with CSTCHO diet    Recommendations:   1. Continue Consistent Carbohydrate diet. Monitor %PO intake, monitor diet tolerance.   2. Consider ONS (Ensure MAX) if pt not meeting EER via PO intake when diet is advanced.   3. Patient would benefit from further Medical Nutrition Therapy after discharge. Recommend patient to follow up with [facility]. Email [email or web address] or call [phone] to make an appointment.   4. Pain and bowel regimen per team.   5. Monitor [id number] lytes and replete prn.   6. RD to follow    Education: CHO food groups, non-starchy vegetables, healthy plate completed. Reinforce on follow-up    Risk Level: High [ X  ] Moderate [   ] Low [   ]

## 2021-12-06 NOTE — PROGRESS NOTE ADULT - NUTRITIONAL ASSESSMENT
This patient has been assessed with a concern for Malnutrition and has been determined to have a diagnosis/diagnoses of Underweight (BMI < 19).    This patient is being managed with:   Diet Consistent Carbohydrate/No Snacks-  Entered: Dec  6 2021  8:20AM    
This patient has been assessed with a concern for Malnutrition and has been determined to have a diagnosis/diagnoses of Underweight (BMI < 19).    This patient is being managed with:   Diet NPO-  NPO for Procedure/Test     NPO Start Date: 01-Dec-2021   NPO Start Time: 11:00  Except Medications  With Ice Chips/Sips of Water  Entered: Dec  1 2021 10:19AM    
This patient has been assessed with a concern for Malnutrition and has been determined to have a diagnosis/diagnoses of Underweight (BMI < 19).    This patient is being managed with:   Diet Consistent Carbohydrate/No Snacks-  Entered: Dec  1 2021  6:43PM    
This patient has been assessed with a concern for Malnutrition and has been determined to have a diagnosis/diagnoses of Underweight (BMI < 19).    This patient is being managed with:   Diet Consistent Carbohydrate/No Snacks-  Entered: Dec  1 2021  6:43PM    
This patient has been assessed with a concern for Malnutrition and has been determined to have a diagnosis/diagnoses of Underweight (BMI < 19).    This patient is being managed with:   Diet NPO after Midnight-     NPO Start Date: 05-Dec-2021   NPO Start Time: 23:59  Entered: Dec  5 2021  9:08AM    Diet Consistent Carbohydrate/No Snacks-  Entered: Dec  1 2021  6:43PM    
This patient has been assessed with a concern for Malnutrition and has been determined to have a diagnosis/diagnoses of Underweight (BMI < 19).    This patient is being managed with:   Diet Consistent Carbohydrate/No Snacks-  Entered: Dec  1 2021  6:43PM

## 2021-12-06 NOTE — PROGRESS NOTE ADULT - PROBLEM SELECTOR PLAN 6
Patient with Na of 131 today which is improved from 129, likely hypovolemic as urine lytes showing pre-renal picture and improved with IV fluids and PO intake   -continue to trend      #SANDY  -patient with Cr elevated at 1.38; no history of CKD  -will continue to trend, if increases today will order for urine lytes and UA  -patient producing urine Patient with Na of 134 today which is improved from 129, likely hypovolemic as urine lytes showing pre-renal picture and improved with IV fluids and PO intake   -continue to trend      #SANDY  -patient with Cr elevated at 1.39; no history of CKD  -will continue to trend, if increases today will order for urine lytes and UA  -patient producing urine

## 2021-12-06 NOTE — PROGRESS NOTE ADULT - ATTENDING COMMENTS
Pt seen on rounds this afternoon and events of the weekend reviewed.  Glucoses in the 200 range early yesterday, have since decreased to  since supper last night.  Likely seeing a response to treatment of the infection, plus some element of reversal of glucose toxicity.  Will decrease the Lantus to 45 units, the premeal to 17 units

## 2021-12-06 NOTE — DISCHARGE NOTE PROVIDER - CARE PROVIDERS DIRECT ADDRESSES
,[email or web address],[email or web address]

## 2021-12-06 NOTE — DISCHARGE NOTE PROVIDER - NSDCMRMEDTOKEN_GEN_ALL_CORE_FT
atorvastatin 20 mg oral tablet: 1 tab(s) orally once a day  Basaglar KwikPen 100 units/mL subcutaneous solution: 50 unit(s) subcutaneous once a day (at bedtime)  Insulin Lispro KwikPen 100 units/mL injectable solution: 15 unit(s) injectable 3 times a day (with meals)   acetaminophen 325 mg oral tablet: 2 tab(s) orally every 6 hours, As needed, Temp greater or equal to 38C (100.4F), Mild Pain (1 - 3)  atorvastatin 20 mg oral tablet: 1 tab(s) orally once a day  calcium carbonate 500 mg (200 mg elemental calcium) oral tablet, chewable: 1 tab(s) orally 3 times a day, As needed, Dyspepsia  cefTRIAXone: 2000 milligram(s) intravenous every 24 hours  chlorhexidine 2% topical pad: Apply topically to affected area once a day, As Needed  insulin glargine: 45 unit(s) subcutaneous once a day (at bedtime)  insulin lispro 100 units/mL injectable solution: 17 unit(s) injectable 3 times a day (before meals)  oxyCODONE 5 mg oral tablet: 1 tab(s) orally every 6 hours, As needed, Moderate Pain (4 - 6)  pantoprazole 40 mg oral delayed release tablet: 1 tab(s) orally once a day (before a meal)  simethicone 80 mg oral tablet, chewable: 1 tab(s) orally 2 times a day, As needed, Heartburn  sodium hypochlorite 0.125% topical solution: 1 application topically once a day   acetaminophen 325 mg oral tablet: 2 tab(s) orally every 6 hours, As needed, Temp greater or equal to 38C (100.4F), Mild Pain (1 - 3)  atorvastatin 20 mg oral tablet: 1 tab(s) orally once a day  calcium carbonate 500 mg (200 mg elemental calcium) oral tablet, chewable: 1 tab(s) orally 3 times a day, As needed, Dyspepsia  cefTRIAXone: 2000 milligram(s) intravenous once a day  HumaLOG 100 units/mL injectable solution: 17 unit(s) injectable 3 times a day (before meals)  insulin glargine: 45 unit(s) subcutaneous once a day (at bedtime)  oxyCODONE 5 mg oral tablet: 1 tab(s) orally every 6 hours, As needed, Moderate Pain (4 - 6)  pantoprazole 40 mg oral delayed release tablet: 1 tab(s) orally once a day (before a meal)  simethicone 80 mg oral tablet, chewable: 1 tab(s) orally 2 times a day, As needed, Heartburn  sodium hypochlorite 0.125% topical solution: 1 application topically once a day

## 2021-12-06 NOTE — DISCHARGE NOTE PROVIDER - HOSPITAL COURSE
#Discharge: do not delete    Mr. Barragan is a 45 y/o male with PMHx of poorly controlled IDDM and diabetic neuropathy BIBEMS for CP, found to have DKA, admitted to MICU for insulin gtt and severe sepsis 2/2 OM of R foot. Anion gap closed x2 as of 12/01, off Insulin gtt, now s/p partial right toe amputation being treated for OM of right foot.    Hospital course (by problem):   #Osteomyelitis of ankle or foot, acute.   Pt w/ known h/o DKA and h/o amputation of L toes 11/2020 2/2 diabetic neuropathy.  - XR R foot: relative osteopenia of the fifth ray, equivocal for sequelae of acute osteomyelitis.   -CT right foot w/ contrast showing extensive soft tissue defect with air infiltration extending into the fifth metatarsal, proximal and middle phalanges of the fifth digit c/w osteomyelitis   -CT left foot - no abscess. evidence of cellulitis; suggest MRI. per podiatry hold off on more imaging for left foot  - BCs 12/1 NGTD  - deep wound cultures from surgical site 12/1 growing group B strep and proteus   - s/p partial R foot amputation 12/1 by podiatry - wound care per podiatry   - NWB to RLE   - PT consulted, recs [facility]  - arterial duplex of RLE showing mild PAD without significant stenosis   - per podiatry, no plan to take patient to back OR on 12/6  - patient will be discharged with picc line for 6 weeks of IV ceftriaxone 2g daily    -continue ceftriaxone 2g IV q24h for 6 week course through 1/10/22  - weekly CBC, CMP, ESR, CRP to be faxed to [phone]  -telephone visit with Dr. Rosado in early January, Dr. Rosado will arrange      #Diabetes mellitus.   Patient initially admitted for DKA with glucose 912 on admission w/ AG 31, K 6.8, bicarb 11, BHB 6.1. Pt diagnosed w/ DM last year 11/2020 when he was hospitalized for DKA, also had R toe amputation during that hospitalization. Reportedly takes insulin at home but only intermittently compliant. Reportedly takes basaglar 40u qhs, novolog 1u TID, but prescribed 50u qhs, 15u TID. Meds last picked up 8/2021.  - off insulin drip , DKA resolved   - A1c 14.3%  - per endocrine lantus increased to 50 units at night, lispro 20 units pre-meal TID    - c/w lispro moderate dose sliding scale four times daily with meals and at bedtime  - consider restarting insulin drip if fingersticks start to run high on current regimen   - monitor fingerstick glucose readings, goal 100-180     #Metabolic acidosis.   non-anion gap metabolic acidosis, compensated. likely 2/2 to resolving DKA.  ABG w/ pCO2 24, metabolic acidosis appropriately compensated according to Winter's formula  - currently satting well on RA  -AG wnl.     #Hyperlipidemia.   According to med rec pt prescribed Atorvastatin 20mg qhs, though denies h/o HLD and did not report taking this medication.  - c/w atorvastatin 20mg qhs  -lipid panel wnl except low HDL.     #Anemia.   Patient with hgb around 9 past few days, drop from 12 since admission. No known history of anemia  -could be d/t acute blood loss during surgery however remaining stable at 9   -maintain active T&S, transfuse for hgb <7   -no signs of bleeding on exam  -continue to monitor     #Thrombocytosis   -patient with elevated platelet count, likely reactive in setting of active OM infection   -continue to trend.    #SANDY  -patient with Cr elevated at 1.39; no history of CKD. Cr bump likely 2/2 to TIFFANY from CT scans of LEs with contrast   -will continue to trend  -s/p IV fluids for contrast washout   -patient producing urine.    Inpatient treatment course: Mr Barragan is a 45 y/o male with PMHx of poorly controlled IDDM, transferred from [facility] on 11/30 to the MICU for DKA and sepsis 2/2 suspected osteomyelitis of the right foot. Initial glucose was 912, K 6.7, A1C 14.3. Patient was started on Insulin gtt, potassium repletion, PPI for GERD, Vancomycin/Zosyn for right foot infection. Anion gap normal twice as of 12/1 7:30AM, off Insulin gtt. Endo consulted. Right foot XR revealed osteopenia in fifth ray, patient s/p partial right foot amputation 12/01 by podiatry. Podiatry following for wound care. DKA and SIRS resolved, patient stepped down to [facility]. CT right foot w/ contrast showing extensive soft tissue defect with air infiltration extending into the fifth metatarsal, proximal and middle phalanges of the fifth digit c/w osteomyelitis. Gave 10U regular insulin for BG >300. per endo start on 35 units lantus/basal insulin and 10 units premeal. Deep wound cultures from surgery growing group B strep, enterococcus, proteus. blood cultures NGTD. per podiatry infection seeding to calcaneous and may need BKA. vascular consulted, ordered LE arterial duplex for b/l LEs. per podiatry also left foot wound with foul-smelling wound, ordered left foot xray to evaluate. recs NWB to RLE. per endocrine recs started on lantus 35 nightly, lispro pre-meal TID 10 units. c/w mISS. FG goal 100-180. ID consulted. PT consulted.  AM hgb dropped to 9.9 from 12 yesterday, ordered for repeat CBC with hgb again 9.9. lipid panel wnl except for HDL midly low. per ID group B strep major bug running the show, changed abx to ceftriaxone 2g q24 (d/c vanc and zosyn). around 2pm patient complaining of new 8/10 chest pain and pressure that started when he was eating and lots of hiccuping. Vitals stable, EKG showing NSR with LVH. PPI increased to BID and baclofen 5mg TID ordered for hiccups. dose of carafate given. urine lytes c/w pre-renal picture. per endo recs lantus increased to 50 units nightly and pre-meal insulin increased to 12 units TID. arterial duplex LE showing mild PAD, no significant stenosis. Changed to 56 units lantus. CT foot - no abscess. evidence of cellulitis; suggest MRI. adjusted lantus/lisp to 50/20 per endo recs. ID recc ceftriaxone 2g for 6wks. Podiatry with no plans for debridement. Gave some NS for prevention of TIFFANY. iron studies showing low serum iron, rest wnl. Picc line placed , patient to be discharged on 6 weeks of IV ceftriaxone to [facility] with ID follow up.    Patient was discharged to: [facility]    New medications: ceftriaxone through picc line, atorvastatin 20mg daily  Items to follow up as outpatient: ID appointment, weekly labs to be faxed to Dr. Rosado     Physical exam at the time of discharge:  General: sitting up in bed, NAD   HEENT: NC/AT; PERRL, anicteric sclera; MMM  Neck: supple, no JVD   Cardiovascular: +S1/S2, RRR, no murmurs   Respiratory: CTA B/L; no W/R/R. no increased WOB.   Gastrointestinal: soft, NT/ND; +BSx4  Extremities: WWP; no edema, clubbing or cyanosis. L foot s/p toes amputated, wrapped in gauze. RLE wrapped in gauze and ACE.   Vascular: 2+ radial pulses B/L  Neurological: AAOx3; decreased sensation in b/l LEs. 5/5 muscle strength.  Dermatologic: no appreciable wounds or damage to the skin. dry flaking skin on b/l shins.     #Discharge: do not delete    Mr. Barragan is a 47 y/o male with PMHx of poorly controlled IDDM and diabetic neuropathy BIBEMS for CP, found to have DKA, admitted to MICU for insulin gtt and severe sepsis 2/2 OM of R foot. Anion gap closed x2 as of 12/01, off Insulin gtt, now s/p partial right toe amputation being treated for OM of right foot.    Hospital course (by problem):   #Osteomyelitis of ankle or foot, acute.   Pt w/ known h/o DKA and h/o amputation of L toes 11/2020 2/2 diabetic neuropathy.  - XR R foot: relative osteopenia of the fifth ray, equivocal for sequelae of acute osteomyelitis.   -CT right foot w/ contrast showing extensive soft tissue defect with air infiltration extending into the fifth metatarsal, proximal and middle phalanges of the fifth digit c/w osteomyelitis   -CT left foot - no abscess. evidence of cellulitis; suggest MRI. per podiatry hold off on more imaging for left foot  - BCs 12/1 NGTD  - deep wound cultures from surgical site 12/1 growing group B strep and proteus   - s/p partial R foot amputation 12/1 by podiatry - wound care per podiatry   - NWB to RLE   - PT consulted, recs [facility]  - arterial duplex of RLE showing mild PAD without significant stenosis   - per podiatry, no plan to take patient to back OR on 12/6  - patient will be discharged with picc line for 6 weeks of IV ceftriaxone 2g daily until January 10th 2022   -continue ceftriaxone 2g IV q24h for 6 week course through 1/10/22  - weekly CBC, CMP, ESR, CRP to be faxed to [phone]  -telephone visit with Dr. Rosado in early January, Dr. Rosado will arrange      #Diabetes mellitus.   Patient initially admitted for DKA with glucose 912 on admission w/ AG 31, K 6.8, bicarb 11, BHB 6.1. Pt diagnosed w/ DM last year 11/2020 when he was hospitalized for DKA, also had R toe amputation during that hospitalization. Reportedly takes insulin at home but only intermittently compliant. Reportedly takes basaglar 40u qhs, novolog 1u TID, but prescribed 50u qhs, 15u TID. Meds last picked up 8/2021.  - off insulin drip , DKA resolved   - A1c 14.3%  - per endocrine lantus increased to 45 units at night, lispro 17 units pre-meal TID    - c/w lispro moderate dose sliding scale four times daily with meals and at bedtime  - consider restarting insulin drip if fingersticks start to run high on current regimen   - monitor fingerstick glucose readings, goal 100-180     #Metabolic acidosis.   non-anion gap metabolic acidosis, compensated. likely 2/2 to resolving DKA.  ABG w/ pCO2 24, metabolic acidosis appropriately compensated according to Winter's formula  - currently satting well on RA  -AG wnl.     #Hyperlipidemia.   According to med rec pt prescribed Atorvastatin 20mg qhs, though denies h/o HLD and did not report taking this medication.  - c/w atorvastatin 20mg qhs  -lipid panel wnl except low HDL.     #Anemia.   Patient with hgb around 9 past few days, drop from 12 since admission. No known history of anemia  -could be d/t acute blood loss during surgery however remaining stable at 9   -maintain active T&S, transfuse for hgb <7   -no signs of bleeding on exam  -continue to monitor     #Thrombocytosis   -patient with elevated platelet count, likely reactive in setting of active OM infection   -continue to trend.    #SANDY  -patient with Cr elevated at 1.39; no history of CKD. Cr bump likely 2/2 to TIFFANY from CT scans of LEs with contrast   -will continue to trend  -s/p IV fluids for contrast washout   -patient producing urine.    Inpatient treatment course: Mr Barragan is a 47 y/o male with PMHx of poorly controlled IDDM, transferred from [facility] on 11/30 to the MICU for DKA and sepsis 2/2 suspected osteomyelitis of the right foot. Initial glucose was 912, K 6.7, A1C 14.3. Patient was started on Insulin gtt, potassium repletion, PPI for GERD, Vancomycin/Zosyn for right foot infection. Anion gap normal twice as of 12/1 7:30AM, off Insulin gtt. Endo consulted. Right foot XR revealed osteopenia in fifth ray, patient s/p partial right foot amputation 12/01 by podiatry. Podiatry following for wound care. DKA and SIRS resolved, patient stepped down to [facility]. CT right foot w/ contrast showing extensive soft tissue defect with air infiltration extending into the fifth metatarsal, proximal and middle phalanges of the fifth digit c/w osteomyelitis. Gave 10U regular insulin for BG >300. per endo start on 35 units lantus/basal insulin and 10 units premeal. Deep wound cultures from surgery growing group B strep, enterococcus, proteus. blood cultures NGTD. per podiatry infection seeding to calcaneous and may need BKA. vascular consulted, ordered LE arterial duplex for b/l LEs. per podiatry also left foot wound with foul-smelling wound, ordered left foot xray to evaluate. recs NWB to RLE. per endocrine recs started on lantus 35 nightly, lispro pre-meal TID 10 units. c/w mISS. FG goal 100-180. ID consulted. PT consulted.  AM hgb dropped to 9.9 from 12 yesterday, ordered for repeat CBC with hgb again 9.9. lipid panel wnl except for HDL midly low. per ID group B strep major bug running the show, changed abx to ceftriaxone 2g q24 (d/c vanc and zosyn). around 2pm patient complaining of new 8/10 chest pain and pressure that started when he was eating and lots of hiccuping. Vitals stable, EKG showing NSR with LVH. PPI increased to BID and baclofen 5mg TID ordered for hiccups. dose of carafate given. urine lytes c/w pre-renal picture. per endo recs lantus increased to 50 units nightly and pre-meal insulin increased to 12 units TID. arterial duplex LE showing mild PAD, no significant stenosis. Changed to 56 units lantus. CT foot - no abscess. evidence of cellulitis; suggest MRI. adjusted lantus/lisp to 50/20 per endo recs. ID recc ceftriaxone 2g for 6wks. Podiatry with no plans for debridement. Gave some NS for prevention of TIFFANY. iron studies showing low serum iron, rest wnl. Picc line placed , patient to be discharged on 6 weeks of IV ceftriaxone to [facility] with ID follow up.    Patient was discharged to: [facility]    New medications: ceftriaxone through picc line until 1/10/22, atorvastatin 20mg daily, new insulin regimen (as above), protonix   Items to follow up as outpatient: ID appointment, weekly labs to be faxed to Dr. Rosado , PCP appointment, podiatry appointment, endocrinology appointment     Physical exam at the time of discharge:  General: sitting up in bed, NAD   HEENT: NC/AT; PERRL, anicteric sclera; MMM  Neck: supple, no JVD   Cardiovascular: +S1/S2, RRR, no murmurs   Respiratory: CTA B/L; no W/R/R. no increased WOB.   Gastrointestinal: soft, NT/ND; +BSx4  Extremities: WWP; no edema, clubbing or cyanosis. L foot s/p toes amputated, wrapped in gauze. RLE wrapped in gauze and ACE.   Vascular: 2+ radial pulses B/L  Neurological: AAOx3; decreased sensation in b/l LEs. 5/5 muscle strength.  Dermatologic: no appreciable wounds or damage to the skin. dry flaking skin on b/l shins.     #Discharge: do not delete    Mr. Barragan is a 47 y/o male with PMHx of poorly controlled IDDM and diabetic neuropathy BIBEMS for CP, found to have DKA, admitted to MICU for insulin gtt and severe sepsis 2/2 OM of R foot. Anion gap closed x2 as of 12/01, off Insulin gtt, now s/p partial right toe amputation being treated for OM of right foot.    Hospital course (by problem):   #Osteomyelitis of ankle or foot, acute.   Pt w/ known h/o DKA and h/o amputation of L toes 11/2020 2/2 diabetic neuropathy.  - XR R foot: relative osteopenia of the fifth ray, equivocal for sequelae of acute osteomyelitis.   -CT right foot w/ contrast showing extensive soft tissue defect with air infiltration extending into the fifth metatarsal, proximal and middle phalanges of the fifth digit c/w osteomyelitis   -CT left foot - no abscess. evidence of cellulitis; suggest MRI. per podiatry hold off on more imaging for left foot  - BCs 12/1 NGTD  - deep wound cultures from surgical site 12/1 growing group B strep and proteus   - s/p partial R foot amputation 12/1 by podiatry - wound care per podiatry   - NWB to RLE   - PT consulted, recs [facility]  - arterial duplex of RLE showing mild PAD without significant stenosis   - per podiatry, no plan to take patient to back OR on 12/6  - patient will be discharged with picc line for 6 weeks of IV ceftriaxone 2g daily until January 10th 2022   -continue ceftriaxone 2g IV q24h for 6 week course through 1/10/22  - weekly CBC, CMP, ESR, CRP to be faxed to [phone]  -telephone visit with Dr. Rosado in early January, Dr. Rosado will arrange   Wound Care Instructions:  Right foot: Apply saline to gauze (wet to dry dressing) and apply to surgical site on the lateral aspect of the foot. Wrap the foot with kerlix and ACE.  Left foot: Apply saline to gauze on the plantar aspect of the TMA site and wrap with kerlix and ACE.      #Diabetes mellitus.   Patient initially admitted for DKA with glucose 912 on admission w/ AG 31, K 6.8, bicarb 11, BHB 6.1. Pt diagnosed w/ DM last year 11/2020 when he was hospitalized for DKA, also had R toe amputation during that hospitalization. Reportedly takes insulin at home but only intermittently compliant. Reportedly takes basaglar 40u qhs, novolog 1u TID, but prescribed 50u qhs, 15u TID. Meds last picked up 8/2021.  - off insulin drip , DKA resolved   - A1c 14.3%  - per endocrine lantus increased to 45 units at night, lispro 17 units pre-meal TID    - c/w lispro moderate dose sliding scale four times daily with meals and at bedtime  - consider restarting insulin drip if fingersticks start to run high on current regimen   - monitor fingerstick glucose readings, goal 100-180     #Metabolic acidosis.   non-anion gap metabolic acidosis, compensated. likely 2/2 to resolving DKA.  ABG w/ pCO2 24, metabolic acidosis appropriately compensated according to Winter's formula  - currently satting well on RA  -AG wnl.     #Hyperlipidemia.   According to med rec pt prescribed Atorvastatin 20mg qhs, though denies h/o HLD and did not report taking this medication.  - c/w atorvastatin 20mg qhs  -lipid panel wnl except low HDL.     #Anemia.   Patient with hgb around 9 past few days, drop from 12 since admission. No known history of anemia  -could be d/t acute blood loss during surgery however remaining stable at 9   -maintain active T&S, transfuse for hgb <7   -no signs of bleeding on exam  -continue to monitor     #Thrombocytosis   -patient with elevated platelet count, likely reactive in setting of active OM infection   -continue to trend.    #SANDY  -patient with Cr elevated at 1.39; no history of CKD. Cr bump likely 2/2 to TIFFANY from CT scans of LEs with contrast   -will continue to trend  -s/p IV fluids for contrast washout   -patient producing urine.    Inpatient treatment course: Mr Barragan is a 47 y/o male with PMHx of poorly controlled IDDM, transferred from [facility] on 11/30 to the MICU for DKA and sepsis 2/2 suspected osteomyelitis of the right foot. Initial glucose was 912, K 6.7, A1C 14.3. Patient was started on Insulin gtt, potassium repletion, PPI for GERD, Vancomycin/Zosyn for right foot infection. Anion gap normal twice as of 12/1 7:30AM, off Insulin gtt. Endo consulted. Right foot XR revealed osteopenia in fifth ray, patient s/p partial right foot amputation 12/01 by podiatry. Podiatry following for wound care. DKA and SIRS resolved, patient stepped down to [facility]. CT right foot w/ contrast showing extensive soft tissue defect with air infiltration extending into the fifth metatarsal, proximal and middle phalanges of the fifth digit c/w osteomyelitis. Gave 10U regular insulin for BG >300. per endo start on 35 units lantus/basal insulin and 10 units premeal. Deep wound cultures from surgery growing group B strep, enterococcus, proteus. blood cultures NGTD. per podiatry infection seeding to calcaneous and may need BKA. vascular consulted, ordered LE arterial duplex for b/l LEs. per podiatry also left foot wound with foul-smelling wound, ordered left foot xray to evaluate. recs NWB to RLE. per endocrine recs started on lantus 35 nightly, lispro pre-meal TID 10 units. c/w mISS. FG goal 100-180. ID consulted. PT consulted.  AM hgb dropped to 9.9 from 12 yesterday, ordered for repeat CBC with hgb again 9.9. lipid panel wnl except for HDL midly low. per ID group B strep major bug running the show, changed abx to ceftriaxone 2g q24 (d/c vanc and zosyn). around 2pm patient complaining of new 8/10 chest pain and pressure that started when he was eating and lots of hiccuping. Vitals stable, EKG showing NSR with LVH. PPI increased to BID and baclofen 5mg TID ordered for hiccups. dose of carafate given. urine lytes c/w pre-renal picture. per endo recs lantus increased to 50 units nightly and pre-meal insulin increased to 12 units TID. arterial duplex LE showing mild PAD, no significant stenosis. Changed to 56 units lantus. CT foot - no abscess. evidence of cellulitis; suggest MRI. adjusted lantus/lisp to 50/20 per endo recs. ID recc ceftriaxone 2g for 6wks. Podiatry with no plans for debridement. Gave some NS for prevention of TIFFANY. iron studies showing low serum iron, rest wnl. Picc line placed , patient to be discharged on 6 weeks of IV ceftriaxone to [facility] with ID follow up.    Patient was discharged to: [facility]    New medications: ceftriaxone through picc line until 1/10/22, atorvastatin 20mg daily, new insulin regimen (as above), protonix   Items to follow up as outpatient: ID appointment, weekly labs to be faxed to Dr. Rosado , PCP appointment, podiatry appointment, endocrinology appointment     Physical exam at the time of discharge:  General: sitting up in bed, NAD   HEENT: NC/AT; PERRL, anicteric sclera; MMM  Neck: supple, no JVD   Cardiovascular: +S1/S2, RRR, no murmurs   Respiratory: CTA B/L; no W/R/R. no increased WOB.   Gastrointestinal: soft, NT/ND; +BSx4  Extremities: WWP; no edema, clubbing or cyanosis. L foot s/p toes amputated, wrapped in gauze. RLE wrapped in gauze and ACE.   Vascular: 2+ radial pulses B/L  Neurological: AAOx3; decreased sensation in b/l LEs. 5/5 muscle strength.  Dermatologic: no appreciable wounds or damage to the skin. dry flaking skin on b/l shins.

## 2021-12-06 NOTE — PROGRESS NOTE ADULT - ATTENDING COMMENTS
Patient seen and examined at bedside. Agree with exam, a/p as above with the following addendum/edits:     46 year old M w/ uncontrolled IDDM w/ neuropathy, p/w DKA and severe sepsis 2/2 R foot osteomyelitis, s/p insulin gtt in MICU, stepped down to [facility] Patient seen and examined at bedside. Agree with exam, a/p as above with the following addendum/edits:     46 year old M w/ uncontrolled IDDM w/ neuropathy, p/w DKA and severe sepsis 2/2 R foot osteomyelitis, s/p insulin gtt in MICU, stepped down to [facility], s/p partial amputation and I&D by podiatry, now doing well on ceftriaxone. No further podiatry intervention. ID recommends 6 weeks of ceftriaxone. Plan for PICC and discharge to [facility] tomorrow. Slight elevation in Cr may be due to contrast induced nephropathy, will hydrate and re-check Cr tomorrow.

## 2021-12-06 NOTE — PROGRESS NOTE ADULT - SUBJECTIVE AND OBJECTIVE BOX
Patient is a 46y old  Male who presents with a chief complaint of DKA (06 Dec 2021 08:46)      INTERVAL HPI/ OVERNIGHT EVENTS: Pt evaluated at bedside. MISSY o/n. No new complaints at this time.       LABS                        8.7    9.74  )-----------( 502      ( 06 Dec 2021 06:58 )             26.7     12-06    134<L>  |  99  |  9   ----------------------------<  82  4.1   |  24  |  1.39<H>    Ca    9.3      06 Dec 2021 06:58  Phos  4.4     12-06  Mg     1.9     12-06    TPro  7.6  /  Alb  2.6<L>  /  TBili  0.2  /  DBili  x   /  AST  11  /  ALT  6<L>  /  AlkPhos  149<H>  12-05        ICU Vital Signs Last 24 Hrs  T(C): 36.9 (06 Dec 2021 06:09), Max: 36.9 (06 Dec 2021 06:09)  T(F): 98.5 (06 Dec 2021 06:09), Max: 98.5 (06 Dec 2021 06:09)  HR: 85 (06 Dec 2021 06:09) (84 - 97)  BP: 100/60 (06 Dec 2021 06:09) (100/60 - 112/67)  BP(mean): 82 (05 Dec 2021 22:23) (82 - 82)  ABP: --  ABP(mean): --  RR: 16 (06 Dec 2021 06:09) (16 - 18)  SpO2: 96% (06 Dec 2021 06:09) (96% - 98%)      RADIOLOGY    MICROBIOLOGY    PHYSICAL EXAM  GEN: NAD, AAOx3, resting comfortably with pain controlled      LE Focused: CFT shows adequate perfusion b/l with no signs of ischemic compromise.  No strikethrough is appreciated on surgical dressings.  Dressings were dry, clean, and intact.  No neuromuscular deficits appreciated. Right foot wound has been left open on the lateral aspect s/p partial 5th ray amp. 2 incisions present dorsally (one over the 1st intermetatarsal space and 2nd over the 3rd intermetatarsal space)-- packed with gauze. Granular and fibrous tissue present at amp site. Negative for purulent drainage at this time.      Wound at plantar aspect of L foot TMA site. + mild malodor. Hyperkeratotic skin present diffusely plantarly on L foot. Negative for purulent drainage, but undermining present. Negative for tunneling/ tracking.

## 2021-12-06 NOTE — PROGRESS NOTE ADULT - PROBLEM SELECTOR PLAN 2
Patient initially admitted for DKA with glucose 912 on admission w/ AG 31, K 6.8, bicarb 11, BHB 6.1. Pt diagnosed w/ DM last year 11/2020 when he was hospitalized for DKA, also had R toe amputation during that hospitalization. Reportedly takes insulin at home but only intermittently compliant. Reportedly takes basaglar 40u qhs, novolog 1u TID, but prescribed 50u qhs, 15u TID. Meds last picked up 8/2021.  - off insulin drip , DKA resolved   - A1c 14.3%  -c/w SS for now   -per endocrine lantus increased to 56 units at night, lispro 16 units pre-meal TID    -c/w lispro moderate dose sliding scale four times daily with meals and at bedtime  -consider restarting insulin drip if fingersticks start to run high on current regimen   -monitor fingerstick glucose readings, goal 100-180 Patient initially admitted for DKA with glucose 912 on admission w/ AG 31, K 6.8, bicarb 11, BHB 6.1. Pt diagnosed w/ DM last year 11/2020 when he was hospitalized for DKA, also had R toe amputation during that hospitalization. Reportedly takes insulin at home but only intermittently compliant. Reportedly takes basaglar 40u qhs, novolog 1u TID, but prescribed 50u qhs, 15u TID. Meds last picked up 8/2021.  - off insulin drip , DKA resolved   - A1c 14.3%  - c/w SS for now   - per endocrine lantus increased to 50 units at night, lispro 20 units pre-meal TID    - c/w lispro moderate dose sliding scale four times daily with meals and at bedtime  - consider restarting insulin drip if fingersticks start to run high on current regimen   - monitor fingerstick glucose readings, goal 100-180. 103 this am 12/6

## 2021-12-06 NOTE — PROGRESS NOTE ADULT - SUBJECTIVE AND OBJECTIVE BOX
INTERVAL HPI/OVERNIGHT EVENTS:    Patient is a 46y old  Male who presents with a chief complaint of DKA (06 Dec 2021 17:08)  Patient did not go for debridement of left foot wound, reportedly not needed. In need of PICC for IV antibiotics, likely to go to rehab tomorrow.  He is eating well and blood sugars remain well controlled.   FSG & Insulin received:    Yesterday:  pre-dinner fs  nutritional lispro 20  units + 0  units lispro SS  bedtime fs  lantus 50  units + 0   units lispro SS    Today:  pre-breakfast fs  nutritional lispro 20  units+ 0   units lispro SS  pre-lunch fs  nutritional lispro 20  units+ 0  units lispro SS    Pt reports the following symptoms:    CONSTITUTIONAL:  Negative fever or chills, feels well, good appetite  EYES:  Negative  blurry vision or double vision  CARDIOVASCULAR:  Negative for chest pain or palpitations  RESPIRATORY:  Negative for cough, wheezing, or SOB   GASTROINTESTINAL:  Negative for nausea, vomiting, diarrhea, constipation, or abdominal pain  GENITOURINARY:  Negative frequency, urgency or dysuria  NEUROLOGIC:  No headache, confusion, dizziness, lightheadedness    MEDICATIONS  (STANDING):  atorvastatin 20 milliGRAM(s) Oral at bedtime  cefTRIAXone   IVPB 2000 milliGRAM(s) IV Intermittent every 24 hours  Dakins Solution - 1/4 Strength 1 Application(s) Topical daily  dextrose 40% Gel 15 Gram(s) Oral once  dextrose 5%. 1000 milliLiter(s) (100 mL/Hr) IV Continuous <Continuous>  dextrose 50% Injectable 50 milliLiter(s) IV Push every 15 minutes  glucagon  Injectable 1 milliGRAM(s) IntraMuscular once  heparin   Injectable 5000 Unit(s) SubCutaneous every 8 hours  insulin glargine Injectable (LANTUS) 45 Unit(s) SubCutaneous at bedtime  insulin lispro (ADMELOG) corrective regimen sliding scale   SubCutaneous Before meals and at bedtime  insulin lispro Injectable (ADMELOG) 17 Unit(s) SubCutaneous three times a day before meals  sodium chloride 0.9%. 1000 milliLiter(s) (80 mL/Hr) IV Continuous <Continuous>    MEDICATIONS  (PRN):  acetaminophen     Tablet .. 650 milliGRAM(s) Oral every 6 hours PRN Temp greater or equal to 38C (100.4F), Mild Pain (1 - 3)  calcium carbonate    500 mG (Tums) Chewable 1 Tablet(s) Chew three times a day PRN Dyspepsia  oxyCODONE    IR 5 milliGRAM(s) Oral every 6 hours PRN Moderate Pain (4 - 6)  simethicone 80 milliGRAM(s) Chew two times a day PRN Heartburn      PHYSICAL EXAM  Vital Signs Last 24 Hrs  T(C): 36.9 (06 Dec 2021 20:30), Max: 36.9 (06 Dec 2021 06:09)  T(F): 98.5 (06 Dec 2021 20:30), Max: 98.5 (06 Dec 2021 06:09)  HR: 82 (06 Dec 2021 20:30) (82 - 96)  BP: 115/70 (06 Dec 2021 20:30) (100/60 - 119/78)  BP(mean): 85 (06 Dec 2021 20:30) (82 - 85)  RR: 18 (06 Dec 2021 20:30) (16 - 18)  SpO2: 97% (06 Dec 2021 20:30) (96% - 100%)    PHYSICAL EXAM:  Constitutional: malnourished male, alert in no acute distress.  HEENT: head atraumatic, PERRL, EMOI, Oropharyngeal mucosa moist, pink, tongue midline, Neck supple, FROM, no LAD  Respiratory: Clear to auscultation bilaterally.  No rales, rhonchi, wheezes.  Cardiovascular: Regular rate and rhythm.  S1, S2 heard.  Gastrointestinal:  Soft, nontender, nondistended, decreased bowel sounds  Vascular: b/l LE cool to touch, 2+ b/l radial pulses, 0-1+ DP pulses b/l, LLE s/p amputation of all toes.   Neurological: AAOx3, 5/5 strength, decreased sensation in b/l LE   Skin: b/l feet wrapped in gauze.    LABS:                        8.7    9.74  )-----------( 502      ( 06 Dec 2021 06:58 )             26.7     12-    134<L>  |  99  |  9   ----------------------------<  82  4.1   |  24  |  1.39<H>    Ca    9.3      06 Dec 2021 06:58  Phos  4.4     12-  Mg     1.9     12-    TPro  7.6  /  Alb  2.6<L>  /  TBili  0.2  /  DBili  x   /  AST  11  /  ALT  6<L>  /  AlkPhos  149<H>  12-            HbA1C:         Insulin Sliding Scale requirements X 24 Hours:      RADIOLOGY & ADDITIONAL TESTS:      A/P:46y male poorly controlled diabetic with right foot osteomyelitis admitted with DKA and in need of amputation.   DKA resolved   A1C: 14.3%  Wt: 77.6kg  Cr: 1.12  GFR: 91  Home regimen: 50 units Basiglar, 15 units Novolog       s/p Partial amputation of R 5th toe yesterday. Deep wound cultures from surgery growing group B strep, enterococcus, proteus.  per podiatry infection seeding to calcaneous. May go for washout of left foot Monday.     1.  DM type 2 - uncontrolled   Please give Lantus to 45 units at night.  Please give Lispro  17  units before each meal.  Please c/w  Lispro moderate dose sliding scale four times daily with meals and at bedtime       Pt's fingerstick glucose goal is 100-180    Will continue to monitor       Pt can follow up at discharge with [facility] by calling  to make an appointment.    case seen and discussed with Dr. Haines and update primary team   INTERVAL HPI/OVERNIGHT EVENTS:    Patient is a 46y old  Male who presents with a chief complaint of DKA (06 Dec 2021 17:08)  Patient did not go for debridement of left foot wound, reportedly not needed. In need of PICC for IV antibiotics, likely to go to rehab tomorrow.  He is eating well and blood sugars remain well controlled.   FSG & Insulin received:    Yesterday:  pre-dinner fs  nutritional lispro 20  units + 0  units lispro SS  bedtime fs  lantus 50  units + 0   units lispro SS    Today:  pre-breakfast fs  nutritional lispro 20  units+ 0   units lispro SS  pre-lunch fs  nutritional lispro 20  units+ 0  units lispro SS    Pt reports the following symptoms:    CONSTITUTIONAL:  Negative fever or chills, feels well, good appetite  EYES:  Negative  blurry vision or double vision  CARDIOVASCULAR:  Negative for chest pain or palpitations  RESPIRATORY:  Negative for cough, wheezing, or SOB   GASTROINTESTINAL:  Negative for nausea, vomiting, diarrhea, constipation, or abdominal pain  GENITOURINARY:  Negative frequency, urgency or dysuria  NEUROLOGIC:  No headache, confusion, dizziness, lightheadedness    MEDICATIONS  (STANDING):  atorvastatin 20 milliGRAM(s) Oral at bedtime  cefTRIAXone   IVPB 2000 milliGRAM(s) IV Intermittent every 24 hours  Dakins Solution - 1/4 Strength 1 Application(s) Topical daily  dextrose 40% Gel 15 Gram(s) Oral once  dextrose 5%. 1000 milliLiter(s) (100 mL/Hr) IV Continuous <Continuous>  dextrose 50% Injectable 50 milliLiter(s) IV Push every 15 minutes  glucagon  Injectable 1 milliGRAM(s) IntraMuscular once  heparin   Injectable 5000 Unit(s) SubCutaneous every 8 hours  insulin glargine Injectable (LANTUS) 45 Unit(s) SubCutaneous at bedtime  insulin lispro (ADMELOG) corrective regimen sliding scale   SubCutaneous Before meals and at bedtime  insulin lispro Injectable (ADMELOG) 17 Unit(s) SubCutaneous three times a day before meals  sodium chloride 0.9%. 1000 milliLiter(s) (80 mL/Hr) IV Continuous <Continuous>    MEDICATIONS  (PRN):  acetaminophen     Tablet .. 650 milliGRAM(s) Oral every 6 hours PRN Temp greater or equal to 38C (100.4F), Mild Pain (1 - 3)  calcium carbonate    500 mG (Tums) Chewable 1 Tablet(s) Chew three times a day PRN Dyspepsia  oxyCODONE    IR 5 milliGRAM(s) Oral every 6 hours PRN Moderate Pain (4 - 6)  simethicone 80 milliGRAM(s) Chew two times a day PRN Heartburn      PHYSICAL EXAM  Vital Signs Last 24 Hrs  T(C): 36.9 (06 Dec 2021 20:30), Max: 36.9 (06 Dec 2021 06:09)  T(F): 98.5 (06 Dec 2021 20:30), Max: 98.5 (06 Dec 2021 06:09)  HR: 82 (06 Dec 2021 20:30) (82 - 96)  BP: 115/70 (06 Dec 2021 20:30) (100/60 - 119/78)  BP(mean): 85 (06 Dec 2021 20:30) (82 - 85)  RR: 18 (06 Dec 2021 20:30) (16 - 18)  SpO2: 97% (06 Dec 2021 20:30) (96% - 100%)    PHYSICAL EXAM:  Constitutional: malnourished male, alert in no acute distress.  HEENT: head atraumatic, PERRL, EMOI, Oropharyngeal mucosa moist, pink, tongue midline, Neck supple, FROM, no LAD  Respiratory: Clear to auscultation bilaterally.  No rales, rhonchi, wheezes.  Cardiovascular: Regular rate and rhythm.  S1, S2 heard.  Gastrointestinal:  Soft, nontender, nondistended, decreased bowel sounds  Vascular: b/l LE cool to touch, 2+ b/l radial pulses, 0-1+ DP pulses b/l, LLE s/p amputation of all toes.   Neurological: AAOx3, 5/5 strength, decreased sensation in b/l LE   Skin: b/l feet wrapped in gauze.    LABS:                        8.7    9.74  )-----------( 502      ( 06 Dec 2021 06:58 )             26.7     12-    134<L>  |  99  |  9   ----------------------------<  82  4.1   |  24  |  1.39<H>    Ca    9.3      06 Dec 2021 06:58  Phos  4.4     12-  Mg     1.9     12-    TPro  7.6  /  Alb  2.6<L>  /  TBili  0.2  /  DBili  x   /  AST  11  /  ALT  6<L>  /  AlkPhos  149<H>  12-            HbA1C:         Insulin Sliding Scale requirements X 24 Hours:      RADIOLOGY & ADDITIONAL TESTS:      A/P:46y male poorly controlled diabetic with right foot osteomyelitis admitted with DKA and in need of amputation.   DKA resolved   A1C: 14.3%  Wt: 77.6kg  Cr: 1.12  GFR: 91  Home regimen: 50 units Basiglar, 15 units Novolog       s/p Partial amputation of R 5th toe yesterday. Deep wound cultures from surgery growing group B strep, enterococcus, proteus.  per podiatry infection seeding to calcaneous. To leave for rehab with PICC in place for IV Abx    1.  DM type 2 - uncontrolled   Please give Lantus to 45 units at night.  Please give Lispro  17  units before each meal.  Please c/w  Lispro moderate dose sliding scale four times daily with meals and at bedtime       Pt's fingerstick glucose goal is 100-180    Will continue to monitor       Pt can follow up at discharge with [facility] by calling  to make an appointment.    case seen and discussed with Dr. Haines and update primary team

## 2021-12-06 NOTE — PROGRESS NOTE ADULT - PROBLEM SELECTOR PLAN 1
Pt w/ known h/o DKA and h/o amputation of L toes 11/2020 2/2 diabetic neuropathy.  - XR R foot: relative osteopenia of the fifth ray, equivocal for sequelae of acute osteomyelitis.   -CT right foot w/ contrast showing extensive soft tissue defect with air infiltration extending into the fifth metatarsal, proximal and middle phalanges of the fifth digit c/w osteomyelitis   -CT left foot - no abscess. evidence of cellulitis; suggest MRI. per podiatry hold off on more imaging for left foot  - c/w ceftriaxone 2g q24  - s/p partial R foot amputation 12/1 by podiatry  -wound care per podiatry   - per podiatry wound probed plantar proximally towards calcaneus, pt may require BKA. planning to do further debridement of right foot monday   - NWB to RLE  -BCs 12/1 NGTD  -deep wound cultures from surgical site 12/1 growing group B strep and proteus   -PT consulted, recs [facility]  -arterial duplex of RLE showing mild PAD without significant stenosis   -vascular following, will consider vascular intervention if no improvement with further debridement by podiatry Pt w/ known h/o DKA and h/o amputation of L toes 11/2020 2/2 diabetic neuropathy.  - XR R foot: relative osteopenia of the fifth ray, equivocal for sequelae of acute osteomyelitis.   -CT right foot w/ contrast showing extensive soft tissue defect with air infiltration extending into the fifth metatarsal, proximal and middle phalanges of the fifth digit c/w osteomyelitis   -CT left foot - no abscess. evidence of cellulitis; suggest MRI. per podiatry hold off on more imaging for left foot  - c/w ceftriaxone 2g q24  - BCs 12/1 NGTD  - deep wound cultures from surgical site 12/1 growing group B strep and proteus   - s/p partial R foot amputation 12/1 by podiatry - wound care per podiatry   - NWB to RLE  - PT consulted, recs [facility]  - arterial duplex of RLE showing mild PAD without significant stenosis   - per podiatry, no plan to take patient to back OR on 12/6  - f/u vascular recs for dispo and arterial duplex Pt w/ known h/o DKA and h/o amputation of L toes 11/2020 2/2 diabetic neuropathy.  - XR R foot: relative osteopenia of the fifth ray, equivocal for sequelae of acute osteomyelitis.   -CT right foot w/ contrast showing extensive soft tissue defect with air infiltration extending into the fifth metatarsal, proximal and middle phalanges of the fifth digit c/w osteomyelitis   -CT left foot - no abscess. evidence of cellulitis; suggest MRI. per podiatry hold off on more imaging for left foot  - c/w ceftriaxone 2g q24  - BCs 12/1 NGTD  - deep wound cultures from surgical site 12/1 growing group B strep and proteus   - s/p partial R foot amputation 12/1 by podiatry - wound care per podiatry   - NWB to RLE   - PT consulted, recs [facility]  - arterial duplex of RLE showing mild PAD without significant stenosis   - per podiatry, no plan to take patient to back OR on 12/6  - f/u vascular recs for dispo and arterial duplex  - f/u PICC line placement for dispo  - Per ID recs, dispo with 6 weeks of Ceftriaxone 2 g IV

## 2021-12-06 NOTE — DISCHARGE NOTE PROVIDER - PROVIDER TOKENS
PROVIDER:[TOKEN:[[id number]],FOLLOWUP:[1 month]] PROVIDER:[TOKEN:[[id number]],FOLLOWUP:[1 month]],PROVIDER:[TOKEN:[[id number]],FOLLOWUP:[2 weeks]],PROVIDER:[TOKEN:[[id number]],SCHEDULEDAPPT:[01/06/2022],SCHEDULEDAPPTTIME:[12:30 PM]],PROVIDER:[TOKEN:[[id number]],FOLLOWUP:[2 weeks]]

## 2021-12-06 NOTE — DISCHARGE NOTE PROVIDER - DETAILS OF MALNUTRITION DIAGNOSIS/DIAGNOSES
This patient has been assessed with a concern for Malnutrition and was treated during this hospitalization for the following Nutrition diagnosis/diagnoses:     -  12/01/2021: Underweight (BMI < 19)

## 2021-12-06 NOTE — DISCHARGE NOTE PROVIDER - NSDCCPCAREPLAN_GEN_ALL_CORE_FT
PRINCIPAL DISCHARGE DIAGNOSIS  Diagnosis: DKA (diabetic ketoacidosis)  Assessment and Plan of Treatment: You were admitted to the ICU because of DKA. Diabetic ketoacidosis is a serious problem that happens to people with diabetes when chemicals called "ketones" build up in their blood. Normally, the body breaks down sugar as a source of energy. But in people with diabetes who do not make any insulin, the body is unable to use sugar. When the body can't use sugar, it burns fat as a source of energy. But burning fat can cause the body to make too many ketones. When ketones build up in the blood, they can be toxic. People can get diabetic ketoacidosis for a few reasons; they have a major illness or health problem, such a heart attack or infection, they take certain medicines or illegal drugs or they don't take their insulin as directed.  The symptoms can include; feeling very thirsty and drinking a lot, urinating a lot, including at night, nausea or vomiting, belly pain, feeling tired or having trouble thinking clearly, having breath that smells sweet or fruity and unintentional weight loss. In order to prevent DKA in the future it is very important that you take your diabetes medications as directed, measure your blood sugar regularly, and continue to see primary care doctor or endocrinologist for management of your diabetes.        SECONDARY DISCHARGE DIAGNOSES  Diagnosis: Osteomyelitis of ankle or foot, acute  Assessment and Plan of Treatment: During your hospital admission you were found to have osteomyelitis of your right foot. This is a bacterial infection of the bone. Bacteria likely got to the bone through the ulcer on your foot. Having uncontrolled diabetes puts you at risk for this condition. You had a toe amputation to help control the infection. You will need to continue to take your IV antibiotics through the IV line that was placed in your arm for another 6 weeks to treat the infection. Please make sure to follow up with infectious disease Dr. Rosado in 1 month. His office will call you to schedule an appointment. It is extremely important that you continue to take your insulin to ensure proper wound healing.    Diagnosis: Diabetes mellitus  Assessment and Plan of Treatment: You have a known history of diabetes mellitus prior to your admission. This condition results from blood sugar levels getting too high because your body is more resistant to insulin. Uncontrolled blood sugar levels can lead to kidney and heart damage, pain/numbness/paralysis in your hands and feet, and increased rates of infections.  To manage this you are on insulin. It is important that you continue to take your insulin when you are discharged so that you can continue to control your blood sugar levels. Additionally be sure to follow up with your primary care physician, podiatrist, and ophthalmologist on a regular basis.      Diagnosis: SANDY (acute kidney injury)  Assessment and Plan of Treatment: During this hospital admission you were found to have acute kidney injury. Acute kidney injury (SANDY) is a sudden episode of kidney failure or kidney damage that happens within a few hours or a few days. SANDY causes a build-up of waste products in your blood and makes it hard for your kidneys to keep the right balance of fluid in your body. Your kidney numbers have been improving. Please follow up with your primary care doctor for further evaluation and testing.       PRINCIPAL DISCHARGE DIAGNOSIS  Diagnosis: DKA (diabetic ketoacidosis)  Assessment and Plan of Treatment: You were admitted to the ICU because of DKA. Diabetic ketoacidosis is a serious problem that happens to people with diabetes when chemicals called "ketones" build up in their blood. Normally, the body breaks down sugar as a source of energy. But in people with diabetes who do not make any insulin, the body is unable to use sugar. When the body can't use sugar, it burns fat as a source of energy. But burning fat can cause the body to make too many ketones. When ketones build up in the blood, they can be toxic. People can get diabetic ketoacidosis for a few reasons; they have a major illness or health problem, such a heart attack or infection, they take certain medicines or illegal drugs or they don't take their insulin as directed.  The symptoms can include; feeling very thirsty and drinking a lot, urinating a lot, including at night, nausea or vomiting, belly pain, feeling tired or having trouble thinking clearly, having breath that smells sweet or fruity and unintentional weight loss. In order to prevent DKA in the future it is very important that you take your diabetes medications as directed, measure your blood sugar regularly, and continue to see primary care doctor or endocrinologist for management of your diabetes. Please follow up with [facility] by calling and making an appointment. Please also follow up with your primary care Dr. Prakash on January 6th at 12:30pm.        SECONDARY DISCHARGE DIAGNOSES  Diagnosis: Osteomyelitis of ankle or foot, acute  Assessment and Plan of Treatment: During your hospital admission you were found to have osteomyelitis of your right foot. This is a bacterial infection of the bone. Bacteria likely got to the bone through the ulcer on your foot. Having uncontrolled diabetes puts you at risk for this condition. You had a toe amputation to help control the infection. You will need to continue to take your IV antibiotics through the IV line that was placed in your arm for another 6 weeks to treat the infection. Please make sure to follow up with infectious disease Dr. Rosado in 1 month. His office will call you to schedule an appointment. It is extremely important that you continue to take your insulin to ensure proper wound healing. Please also follow up with podiatry Dr. Chavarria within 2 weeks of discharge.    Diagnosis: Diabetes mellitus  Assessment and Plan of Treatment: You have a known history of diabetes mellitus prior to your admission. This condition results from blood sugar levels getting too high because your body is more resistant to insulin. Uncontrolled blood sugar levels can lead to kidney and heart damage, pain/numbness/paralysis in your hands and feet, and increased rates of infections.  To manage this you are on insulin. It is important that you continue to take your insulin when you are discharged so that you can continue to control your blood sugar levels. Additionally be sure to follow up with your primary care physician, podiatrist, and ophthalmologist on a regular basis.      Diagnosis: SANDY (acute kidney injury)  Assessment and Plan of Treatment: During this hospital admission you were found to have acute kidney injury. Acute kidney injury (SANDY) is a sudden episode of kidney failure or kidney damage that happens within a few hours or a few days. SANDY causes a build-up of waste products in your blood and makes it hard for your kidneys to keep the right balance of fluid in your body. Your kidney numbers have been improving. Please follow up with your primary care doctor for further evaluation and testing.

## 2021-12-06 NOTE — PROGRESS NOTE ADULT - SUBJECTIVE AND OBJECTIVE BOX
OVERNIGHT EVENTS: No acute events overnight    SUBJECTIVE / INTERVAL HPI: Patient seen and examined at bedside. Reports he is feeling well this morning and states the foot pain is manageable with current medications. Patient als reports intermittent hiccuping is better and GERD symptoms have decreased. States he is able to ambulate with help to the bathroom. Patient has been NPO since last night. Patient denying CP, SOB, palpitations, fever, chills, HA, Dizziness, N/V, abdominal pain, diarrhea, constipation, hematochezia/melena, dysuria, hematuria.    Remaining ROS negative     PHYSICAL EXAM:  General: sitting up in bed, NAD, resting comfortable with pain controlled   HEENT: NC/AT; PERRL, anicteric sclera; MMM  Neck: supple, no JVD   Cardiovascular: +S1/S2, RRR, no murmurs   Respiratory: CTA B/L; no W/R/R. no increased WOB.   Gastrointestinal: soft, NT/ND; +BSx4  Extremities: WWP; no edema, clubbing or cyanosis. L foot s/p toes amputated, hyperkeratotic skin present diffusely plant RLE wrapped in gauze and ACE.   Vascular: 2+ radial pulses B/L  Neurological: AAOx3; decreased sensation in b/l LEs. 5/5 muscle strength.  Dermatologic: no appreciable wounds or damage to the skin. dry flaking skin on b/l shins.    Granular and fibrous tissue present at amp site. Negative for purulent drainage at this time.      Wound at plantar aspect of L foot TMA site. + mild malodor. Hyperkeratotic skin present diffusely plantarly on L foot. Negative for purulent drainage, but undermining present. Negative for tunneling/ tracking.       VITAL SIGNS:  Vital Signs Last 24 Hrs  T(C): 37 (05 Dec 2021 06:24), Max: 37 (05 Dec 2021 06:24)  T(F): 98.6 (05 Dec 2021 06:24), Max: 98.6 (05 Dec 2021 06:24)  HR: 89 (05 Dec 2021 06:24) (88 - 95)  BP: 102/69 (05 Dec 2021 06:24) (102/69 - 134/83)  BP(mean): --  RR: 18 (05 Dec 2021 06:24) (18 - 18)  SpO2: 98% (05 Dec 2021 06:24) (98% - 100%)      MEDICATIONS:  MEDICATIONS  (STANDING):  atorvastatin 20 milliGRAM(s) Oral at bedtime  baclofen 5 milliGRAM(s) Oral three times a day  cefTRIAXone   IVPB 2000 milliGRAM(s) IV Intermittent every 24 hours  Dakins Solution - 1/4 Strength 1 Application(s) Topical daily  dextrose 40% Gel 15 Gram(s) Oral once  dextrose 5%. 1000 milliLiter(s) (100 mL/Hr) IV Continuous <Continuous>  dextrose 50% Injectable 50 milliLiter(s) IV Push every 15 minutes  glucagon  Injectable 1 milliGRAM(s) IntraMuscular once  heparin   Injectable 5000 Unit(s) SubCutaneous every 8 hours  insulin glargine Injectable (LANTUS) 56 Unit(s) SubCutaneous at bedtime  insulin lispro (ADMELOG) corrective regimen sliding scale   SubCutaneous Before meals and at bedtime  insulin lispro Injectable (ADMELOG) 16 Unit(s) SubCutaneous three times a day before meals  pantoprazole    Tablet 40 milliGRAM(s) Oral two times a day  potassium chloride    Tablet ER 40 milliEquivalent(s) Oral once    MEDICATIONS  (PRN):  acetaminophen     Tablet .. 650 milliGRAM(s) Oral every 6 hours PRN Temp greater or equal to 38C (100.4F), Mild Pain (1 - 3)  calcium carbonate    500 mG (Tums) Chewable 1 Tablet(s) Chew three times a day PRN Dyspepsia  oxyCODONE    IR 5 milliGRAM(s) Oral every 6 hours PRN Moderate Pain (4 - 6)  simethicone 80 milliGRAM(s) Chew two times a day PRN Heartburn      ALLERGIES:  Allergies    No Known Allergies    Intolerances        LABS:                        9.7    9.52  )-----------( 497      ( 04 Dec 2021 07:51 )             28.9     12-04    131<L>  |  96  |  16  ----------------------------<  262<H>  3.8   |  22  |  1.38<H>    Ca    9.6      04 Dec 2021 07:51  Mg     1.9     12-04          CAPILLARY BLOOD GLUCOSE      POCT Blood Glucose.: 229 mg/dL (05 Dec 2021 08:41)      RADIOLOGY & ADDITIONAL TESTS: Reviewed. OVERNIGHT EVENTS: No acute events overnight    SUBJECTIVE / INTERVAL HPI: Patient seen and examined at bedside. Reports he is feeling well this morning and states the foot pain is manageable with current medications. Patient als reports intermittent hiccuping is better and GERD symptoms have decreased. States he is able to ambulate with help and walker to the bathroom. Patient has been NPO since last night. Patient denying CP, SOB, palpitations, fever, chills, HA, Dizziness, N/V, abdominal pain, diarrhea, constipation, hematochezia/melena, dysuria, hematuria.    Remaining ROS negative     PHYSICAL EXAM:  General: sitting up in bed, NAD, resting comfortable with pain controlled   HEENT: NC/AT; PERRL, anicteric sclera; MMM  Neck: supple, no JVD   Cardiovascular: +S1/S2, RRR, no murmurs   Respiratory: CTA B/L; no W/R/R. no increased WOB.   Gastrointestinal: soft, NT/ND; +BSx4  Extremities: WWP; no edema, clubbing or cyanosis. L foot s/p toes amputated, + mild malodor, hyperkeratotic skin present diffusely on plantar L foot. No purulent drainage seen. L foot re-wrapped in gauze and ACE wrap. R foot s/p 5th metatarsal amputated, packed with gauze. Granular and fibrous tissue present at amp site. Negative for purulent drainage. R foot re-wrapped in gauze and ACE wrap.   Vascular: 2+ radial pulses B/L  Neurological: AAOx3; decreased sensation in b/l LEs. 5/5 muscle strength.  Dermatologic: no appreciable wounds or damage to the skin. dry flaking skin on b/l shins.      VITAL SIGNS:  Vital Signs Last 24 Hrs  T(C): 36.8 (06 Dec 2021 10:07), Max: 36.9 (06 Dec 2021 06:09)  T(F): 98.2 (06 Dec 2021 10:07), Max: 98.5 (06 Dec 2021 06:09)  HR: 82 (06 Dec 2021 10:07) (82 - 97)  BP: 119/78 (06 Dec 2021 10:07) (100/60 - 119/78)  BP(mean): 82 (05 Dec 2021 22:23) (82 - 82)  RR: 18 (06 Dec 2021 10:07) (16 - 18)  SpO2: 98% (06 Dec 2021 10:07) (96% - 98%)    MEDICATIONS:  MEDICATIONS  (STANDING):  atorvastatin 20 milliGRAM(s) Oral at bedtime  cefTRIAXone   IVPB 2000 milliGRAM(s) IV Intermittent every 24 hours  Dakins Solution - 1/4 Strength 1 Application(s) Topical daily  dextrose 40% Gel 15 Gram(s) Oral once  dextrose 5%. 1000 milliLiter(s) (100 mL/Hr) IV Continuous <Continuous>  dextrose 50% Injectable 50 milliLiter(s) IV Push every 15 minutes  glucagon  Injectable 1 milliGRAM(s) IntraMuscular once  heparin   Injectable 5000 Unit(s) SubCutaneous every 8 hours  insulin glargine Injectable (LANTUS) 50 Unit(s) SubCutaneous at bedtime  insulin lispro (ADMELOG) corrective regimen sliding scale   SubCutaneous Before meals and at bedtime  insulin lispro Injectable (ADMELOG) 20 Unit(s) SubCutaneous three times a day before meals  sodium chloride 0.9%. 1000 milliLiter(s) (80 mL/Hr) IV Continuous <Continuous>    MEDICATIONS  (PRN):  MEDICATIONS  (PRN):  acetaminophen     Tablet .. 650 milliGRAM(s) Oral every 6 hours PRN Temp greater or equal to 38C (100.4F), Mild Pain (1 - 3)  calcium carbonate    500 mG (Tums) Chewable 1 Tablet(s) Chew three times a day PRN Dyspepsia  oxyCODONE    IR 5 milliGRAM(s) Oral every 6 hours PRN Moderate Pain (4 - 6)  simethicone 80 milliGRAM(s) Chew two times a day PRN Heartburn      ALLERGIES:  Allergies    No Known Allergies    Intolerances        LABS:                        8.7    9.74  )-----------( 502      ( 06 Dec 2021 06:58 )             26.7   12-06    134<L>  |  99  |  9   ----------------------------<  82  4.1   |  24  |  1.39<H>    Ca    9.3      06 Dec 2021 06:58  Phos  4.4     12-06  Mg     1.9     12-06    TPro  7.6  /  Alb  2.6<L>  /  TBili  0.2  /  DBili  x   /  AST  11  /  ALT  6<L>  /  AlkPhos  149<H>  12-05    .Surgical Swab deep wound right foot  12-01 @ 19:48   Moderate Streptococcus agalactiae (Group B)  Rare to few Enterococcus faecalis  Rare to few Proteus mirabilis  Rare Pediococcus acidilactici Beta lactamase negative  --  Streptococcus agalactiae (Group B)  Enterococcus faecalis  Proteus mirabilis    .Other wound  12-01 @ 01:01   Culture yields growth of greater than 3 colony types of  Call client services within 7 days if further workup is clinically  indicated.  Culture includes  Numerous Streptococcus agalactiae (Group B) isolated  Group B streptococci are susceptible toampicillin,  penicillin and cefazolin, but may be resistant to  erythromycin and clindamycin.  Recommendations for intrapartum prophylaxis for Group B  streptococci are penicillin or ampicillin.  --  --    Blood Blood-Venous  12-01 @ 00:58   No Growth Final  --  --    Clean Catch Clean Catch (Midstream)  12-01 @ 00:54   <10,000 CFU/mL Normal Urogenital Joanna  --  --      CAPILLARY BLOOD GLUCOSE    POCT Blood Glucose.: 103 mg/dL (06 Dec 2021 08:48)  229 mg/dL (05 Dec 2021 08:41)      RADIOLOGY & ADDITIONAL TESTS: Reviewed.

## 2021-12-06 NOTE — PROGRESS NOTE ADULT - PROBLEM SELECTOR PLAN 5
F: none  E: replete PRN  N: NPO w/ meds for OR today   GI ppx: PPI  DVT ppx: holding for OR today  Code status: FULL CODE  Dispo: DONOVAN
Patient with hgb of 9.2 today, drop from 12 since admission. No known history of anemia  -could be d/t acute blood loss during surgery however remaining stable at 9   -maintain active T&S, transfuse for hgb <7     #Thrombocytosis   -patient with elevated platelet count, likely reactive in setting of active OM infection   -continue to trend
Patient with hgb around 9 past few days, drop from 12 since admission. No known history of anemia  -could be d/t acute blood loss during surgery however remaining stable at 9   -maintain active T&S, transfuse for hgb <7   -no signs of bleeding on exam  -continue to monitor     #Thrombocytosis   -patient with elevated platelet count, likely reactive in setting of active OM infection   -continue to trend
Patient with hgb around 9 past few days, drop from 12 since admission. No known history of anemia  -could be d/t acute blood loss during surgery however remaining stable at 9   -maintain active T&S, transfuse for hgb <7   -no signs of bleeding on exam  -continue to monitor     #Thrombocytosis   -patient with elevated platelet count, likely reactive in setting of active OM infection   -continue to trend
Patient with hgb of 9.2 today, drop from 12 since admission. No known history of anemia  -could be d/t acute blood loss during surgery however remaining stable at 9   -maintain active T&S, transfuse for hgb <7     #Thrombocytosis   -patient with elevated platelet count, likely reactive in setting of active OM infection   -continue to trend
Patient with hgb of 9.9 today, drop from 12 yesterday and consistently dropping since admission. No known history of anemia  -could be d/t acute blood loss during surgery yesterday   -f/u 2pm CBC to trend hgb   -maintain active T&S, transfuse for hgb <7     #Thrombocytosis   -patient with elevated platelet count, likely reactive in setting of active OM infection   -continue to trend

## 2021-12-06 NOTE — PROGRESS NOTE ADULT - ASSESSMENT
Mr. Barragan is a 47 y/o male with PMHx of poorly controlled IDDM and diabetic neuropathy BIBEMS for CP, found to have DKA, admitted to MICU for insulin gtt and severe sepsis 2/2 OM of R foot. Anion gap closed x2 as of 12/01, off Insulin gtt, now s/p partial right toe amputation being treated for OM of right foot.

## 2021-12-06 NOTE — PROGRESS NOTE ADULT - PROBLEM SELECTOR PROBLEM 7
Nutrition, metabolism, and development symptoms
Nutrition, metabolism, and development symptoms
SANDY (acute kidney injury)
Nutrition, metabolism, and development symptoms
Nutrition, metabolism, and development symptoms

## 2021-12-06 NOTE — DISCHARGE NOTE PROVIDER - CARE PROVIDER_API CALL
Carlos Rosado)  Internal Medicine  [address]  [place], [place] [address]  Phone: [phone]  Fax: [phone]  Follow Up Time: 1 month   Carlos Rosado)  Internal Medicine  [address] [place] [address]  Phone: [phone]  Fax: [phone]  Follow Up Time: 1 month    Nubia Chavarria (SHAM)  Podiatric Medicine and Surgery  [facility], [facility] [address]  [place] [address]  Phone: [phone]  Fax: [phone]  Follow Up Time: 2 weeks    Violetta Prakash  Internal Medicine  Phone: [phone]  Fax: ([phone]  Scheduled Appointment: 01/06/2022 12:30 PM    Teto Haines)  EndocrinologyMetabDiabetes; Internal Medicine  [address] [address]  Phone: [phone]  Fax: [phone]  Follow Up Time: 2 weeks

## 2021-12-06 NOTE — PROGRESS NOTE ADULT - ASSESSMENT
47yo M PMH IDDM (diagnosed 11/2020) presented from [facility] complaining of chest pain. Reports polyuria, polydipsia and decreased appetite x1 week. Of note, pt was diagnosed w/ DM last year 11/2020 at which time he was admitted for DKA and all toes on his L foot were amputated.    Podiatry was consulted for the management of the right foot infected diabetic ulcer. Pt was noted to have gas on xray near the 5th metatarsal, although the xray read did not mention it. Right foot is malodorous and 5ccs of purulent drainage was expressed from the proximal aspect of the ulcer (dorsally over the 4th and 5th metatarsal). MICU was notified about the findings of gas on xray. Podiatry Attending discussed with MICU Attending about the importance of taking the patient to the OR immediately, however given his abn electrolyte (Potassium) and possibility of patient having arrythmia' s and increased glucose-- it was decided that we wait until patient has finished his drip and has labs have normalized. Re-evaluated in AM (12/1), pt vital signs have clinically improved. OR pushed back to evening due to emergent case in AM. CT confirms osteo of 5th MT.    Patient is now s/p right partial 5th ray resection packed open.  Abscess was noted in the OR and was drained.     Of note, Examination on 12/2/21 revealed wound on Left foot plantar aspect. +malodor. Xrays are negative for gas/OM.    Plan:  - Right wound packed with WTD dressing, DSD, ABD and ACE wrap. WTD + DSD for left TMA site wound.    - Wrapped Left foot with wet to dry and DSD   - F/U vasc recs  - OR final Cx- polymicrobial growth   - NWB to RLE  - No wound vac at this time, will continue with local wound care and recc dispo to rehab.   - rest of plan per primary team     Podiatry Following 47yo M PMH IDDM (diagnosed 11/2020) presented from [facility] complaining of chest pain. Reports polyuria, polydipsia and decreased appetite x1 week. Of note, pt was diagnosed w/ DM last year 11/2020 at which time he was admitted for DKA and all toes on his L foot were amputated.    Podiatry was consulted for the management of the right foot infected diabetic ulcer. Pt was noted to have gas on xray near the 5th metatarsal, although the xray read did not mention it. Right foot is malodorous and 5ccs of purulent drainage was expressed from the proximal aspect of the ulcer (dorsally over the 4th and 5th metatarsal). MICU was notified about the findings of gas on xray. Podiatry Attending discussed with MICU Attending about the importance of taking the patient to the OR immediately, however given his abn electrolyte (Potassium) and possibility of patient having arrythmia' s and increased glucose-- it was decided that we wait until patient has finished his drip and has labs have normalized. Re-evaluated in AM (12/1), pt vital signs have clinically improved. OR pushed back to evening due to emergent case in AM. CT confirms osteo of 5th MT.    Patient is now s/p right partial 5th ray resection packed open.  Abscess was noted in the OR and was drained.     Of note, Examination on 12/2/21 revealed wound on Left foot plantar aspect. +malodor. Xrays are negative for gas/OM.    Plan:  - Right wound packed with WTD dressing, DSD, ABD and ACE wrap. WTD + DSD for left TMA site wound.    - Wrapped Left foot with wet to dry and DSD   - F/U vasc recs  - OR final Cx- polymicrobial growth   - NWB to RLE  - No wound vac at this time, will continue with local wound care and recc dispo to rehab.   - rest of plan per primary team     Podiatry Following    Pt can follow up w/ Dr. Nubia Chavarria DPM at [facility] ([facility]) within 1-2 weeks following discharge.   [address] [place] [address]  [phone] 47yo M PMH IDDM (diagnosed 11/2020) presented from [facility] complaining of chest pain. Reports polyuria, polydipsia and decreased appetite x1 week. Of note, pt was diagnosed w/ DM last year 11/2020 at which time he was admitted for DKA and all toes on his L foot were amputated.    Podiatry was consulted for the management of the right foot infected diabetic ulcer. Pt was noted to have gas on xray near the 5th metatarsal, although the xray read did not mention it. Right foot is malodorous and 5ccs of purulent drainage was expressed from the proximal aspect of the ulcer (dorsally over the 4th and 5th metatarsal). MICU was notified about the findings of gas on xray. Podiatry Attending discussed with MICU Attending about the importance of taking the patient to the OR immediately, however given his abn electrolyte (Potassium) and possibility of patient having arrythmia' s and increased glucose-- it was decided that we wait until patient has finished his drip and has labs have normalized. Re-evaluated in AM (12/1), pt vital signs have clinically improved. OR pushed back to evening due to emergent case in AM. CT confirms osteo of 5th MT.    Patient is now s/p right partial 5th ray resection packed open.  Abscess was noted in the OR and was drained.     Of note, Examination on 12/2/21 revealed wound on Left foot plantar aspect. +malodor. Xrays are negative for gas/OM.    Plan:  - Right wound packed with WTD dressing, DSD, ABD and ACE wrap. WTD + DSD for left TMA site wound.    - Wrapped Left foot with wet to dry and DSD   - F/U vasc recs  - OR final Cx- polymicrobial growth   - NWB to RLE  - No plan to return to OR, no wound vac at this time, will continue with local wound care and recc dispo to rehab.   - rest of plan per primary team     Podiatry Following    Pt can follow up w/ Dr. Nubia Chavarria DPM at [facility] ([facility]) within 1-2 weeks following discharge.   [address] [place] [address]  [phone]

## 2021-12-06 NOTE — DISCHARGE NOTE PROVIDER - NSDCFUADDAPPT_GEN_ALL_CORE_FT
Please make sure to follow up with infectious disease Dr. Rosado in 1 month; his office will call you to schedule your appointment.     Please also make sure to follow up with your primary care doctor within 2 weeks of discharge.  Please make sure to follow up with infectious disease Dr. Rosado in 1 month; his office will call you to schedule your appointment.     Please follow up with [facility] by calling  to make an appointment.     Please follow up with podiatry Dr. Nubia Chavarria DPM at [facility] (Foot [facility]) within 1-2 weeks following discharge.   [address] [place] [address]  [phone]    Please follow up with your primary care doctor, Dr. Violetta Prakash at [facility] [address] [place] [address], 2nd floor on January 6th at 12:30pm.   [phone]

## 2021-12-06 NOTE — PROGRESS NOTE ADULT - PROBLEM SELECTOR PLAN 7
F: none  E: replete PRN  N: consistent carb   GI ppx: PPI  DVT ppx: heparin  Code status: FULL CODE  Dispo: [facility] F: none  E: replete PRN  N: consistent carb   GI ppx: PPI  DVT ppx: heparin restarted   Code status: FULL CODE  Dispo: [facility]

## 2021-12-07 ENCOUNTER — TRANSCRIPTION ENCOUNTER (OUTPATIENT)
Age: 46
End: 2021-12-07

## 2021-12-07 VITALS
TEMPERATURE: 98 F | HEART RATE: 81 BPM | DIASTOLIC BLOOD PRESSURE: 82 MMHG | RESPIRATION RATE: 18 BRPM | SYSTOLIC BLOOD PRESSURE: 134 MMHG | OXYGEN SATURATION: 100 %

## 2021-12-07 PROBLEM — E11.9 TYPE 2 DIABETES MELLITUS WITHOUT COMPLICATIONS: Chronic | Status: ACTIVE | Noted: 2021-11-30

## 2021-12-07 LAB
ANION GAP SERPL CALC-SCNC: 11 MMOL/L — SIGNIFICANT CHANGE UP (ref 5–17)
ANISOCYTOSIS BLD QL: SLIGHT — SIGNIFICANT CHANGE UP
BASOPHILS # BLD AUTO: 0 K/UL — SIGNIFICANT CHANGE UP (ref 0–0.2)
BASOPHILS NFR BLD AUTO: 0 % — SIGNIFICANT CHANGE UP (ref 0–2)
BUN SERPL-MCNC: 11 MG/DL — SIGNIFICANT CHANGE UP (ref 7–23)
CALCIUM SERPL-MCNC: 9.2 MG/DL — SIGNIFICANT CHANGE UP (ref 8.4–10.5)
CHLORIDE SERPL-SCNC: 101 MMOL/L — SIGNIFICANT CHANGE UP (ref 96–108)
CO2 SERPL-SCNC: 22 MMOL/L — SIGNIFICANT CHANGE UP (ref 22–31)
CREAT SERPL-MCNC: 1.23 MG/DL — SIGNIFICANT CHANGE UP (ref 0.5–1.3)
EOSINOPHIL # BLD AUTO: 0.09 K/UL — SIGNIFICANT CHANGE UP (ref 0–0.5)
EOSINOPHIL NFR BLD AUTO: 0.9 % — SIGNIFICANT CHANGE UP (ref 0–6)
GIANT PLATELETS BLD QL SMEAR: PRESENT — SIGNIFICANT CHANGE UP
GLUCOSE BLDC GLUCOMTR-MCNC: 178 MG/DL — HIGH (ref 70–99)
GLUCOSE BLDC GLUCOMTR-MCNC: 183 MG/DL — HIGH (ref 70–99)
GLUCOSE BLDC GLUCOMTR-MCNC: 96 MG/DL — SIGNIFICANT CHANGE UP (ref 70–99)
GLUCOSE SERPL-MCNC: 154 MG/DL — HIGH (ref 70–99)
HCT VFR BLD CALC: 25.7 % — LOW (ref 39–50)
HGB BLD-MCNC: 8.5 G/DL — LOW (ref 13–17)
HYPOCHROMIA BLD QL: SLIGHT — SIGNIFICANT CHANGE UP
LYMPHOCYTES # BLD AUTO: 2.71 K/UL — SIGNIFICANT CHANGE UP (ref 1–3.3)
LYMPHOCYTES # BLD AUTO: 27.7 % — SIGNIFICANT CHANGE UP (ref 13–44)
MACROCYTES BLD QL: SLIGHT — SIGNIFICANT CHANGE UP
MAGNESIUM SERPL-MCNC: 1.6 MG/DL — SIGNIFICANT CHANGE UP (ref 1.6–2.6)
MANUAL SMEAR VERIFICATION: SIGNIFICANT CHANGE UP
MCHC RBC-ENTMCNC: 29.6 PG — SIGNIFICANT CHANGE UP (ref 27–34)
MCHC RBC-ENTMCNC: 33.1 GM/DL — SIGNIFICANT CHANGE UP (ref 32–36)
MCV RBC AUTO: 89.5 FL — SIGNIFICANT CHANGE UP (ref 80–100)
METAMYELOCYTES # FLD: 1.8 % — HIGH (ref 0–0)
MONOCYTES # BLD AUTO: 0.69 K/UL — SIGNIFICANT CHANGE UP (ref 0–0.9)
MONOCYTES NFR BLD AUTO: 7.1 % — SIGNIFICANT CHANGE UP (ref 2–14)
MYELOCYTES NFR BLD: 3.6 % — HIGH (ref 0–0)
NEUTROPHILS # BLD AUTO: 5.67 K/UL — SIGNIFICANT CHANGE UP (ref 1.8–7.4)
NEUTROPHILS NFR BLD AUTO: 55.3 % — SIGNIFICANT CHANGE UP (ref 43–77)
NEUTS BAND # BLD: 2.7 % — SIGNIFICANT CHANGE UP (ref 0–8)
PLAT MORPH BLD: ABNORMAL
PLATELET # BLD AUTO: 500 K/UL — HIGH (ref 150–400)
POLYCHROMASIA BLD QL SMEAR: SLIGHT — SIGNIFICANT CHANGE UP
POTASSIUM SERPL-MCNC: 4.6 MMOL/L — SIGNIFICANT CHANGE UP (ref 3.5–5.3)
POTASSIUM SERPL-SCNC: 4.6 MMOL/L — SIGNIFICANT CHANGE UP (ref 3.5–5.3)
RBC # BLD: 2.87 M/UL — LOW (ref 4.2–5.8)
RBC # FLD: 13.5 % — SIGNIFICANT CHANGE UP (ref 10.3–14.5)
RBC BLD AUTO: ABNORMAL
SARS-COV-2 RNA SPEC QL NAA+PROBE: SIGNIFICANT CHANGE UP
SMUDGE CELLS # BLD: PRESENT — SIGNIFICANT CHANGE UP
SODIUM SERPL-SCNC: 134 MMOL/L — LOW (ref 135–145)
VARIANT LYMPHS # BLD: 0.9 % — SIGNIFICANT CHANGE UP (ref 0–6)
WBC # BLD: 9.78 K/UL — SIGNIFICANT CHANGE UP (ref 3.8–10.5)
WBC # FLD AUTO: 9.78 K/UL — SIGNIFICANT CHANGE UP (ref 3.8–10.5)

## 2021-12-07 PROCEDURE — 99239 HOSP IP/OBS DSCHRG MGMT >30: CPT | Mod: GC

## 2021-12-07 PROCEDURE — 36573 INSJ PICC RS&I 5 YR+: CPT

## 2021-12-07 PROCEDURE — 76937 US GUIDE VASCULAR ACCESS: CPT | Mod: 26,59

## 2021-12-07 PROCEDURE — 99231 SBSQ HOSP IP/OBS SF/LOW 25: CPT | Mod: GC

## 2021-12-07 RX ORDER — INSULIN LISPRO 100/ML
17 VIAL (ML) SUBCUTANEOUS
Qty: 0 | Refills: 0 | DISCHARGE
Start: 2021-12-07

## 2021-12-07 RX ORDER — BNT162B2 0.23 MG/2.25ML
0.3 INJECTION, SUSPENSION INTRAMUSCULAR ONCE
Refills: 0 | Status: COMPLETED | OUTPATIENT
Start: 2021-12-07 | End: 2021-12-07

## 2021-12-07 RX ORDER — ACETAMINOPHEN 500 MG
2 TABLET ORAL
Qty: 0 | Refills: 0 | DISCHARGE
Start: 2021-12-07

## 2021-12-07 RX ORDER — MAGNESIUM SULFATE 500 MG/ML
2 VIAL (ML) INJECTION ONCE
Refills: 0 | Status: COMPLETED | OUTPATIENT
Start: 2021-12-07 | End: 2021-12-07

## 2021-12-07 RX ORDER — INSULIN GLARGINE 100 [IU]/ML
50 INJECTION, SOLUTION SUBCUTANEOUS
Qty: 0 | Refills: 0 | DISCHARGE

## 2021-12-07 RX ORDER — CEFTRIAXONE 500 MG/1
2000 INJECTION, POWDER, FOR SOLUTION INTRAMUSCULAR; INTRAVENOUS
Qty: 0 | Refills: 0 | DISCHARGE
Start: 2021-12-07 | End: 2022-01-10

## 2021-12-07 RX ORDER — PANTOPRAZOLE SODIUM 20 MG/1
1 TABLET, DELAYED RELEASE ORAL
Qty: 0 | Refills: 0 | DISCHARGE
Start: 2021-12-07

## 2021-12-07 RX ORDER — BNT162B2 0.23 MG/2.25ML
0.3 INJECTION, SUSPENSION INTRAMUSCULAR ONCE
Refills: 0 | Status: DISCONTINUED | OUTPATIENT
Start: 2021-12-07 | End: 2021-12-07

## 2021-12-07 RX ORDER — SODIUM HYPOCHLORITE 0.125 %
1 SOLUTION, NON-ORAL MISCELLANEOUS
Qty: 0 | Refills: 0 | DISCHARGE
Start: 2021-12-07

## 2021-12-07 RX ORDER — INSULIN GLARGINE 100 [IU]/ML
45 INJECTION, SOLUTION SUBCUTANEOUS
Qty: 0 | Refills: 0 | DISCHARGE
Start: 2021-12-07

## 2021-12-07 RX ORDER — CALCIUM CARBONATE 500(1250)
1 TABLET ORAL
Qty: 0 | Refills: 0 | DISCHARGE
Start: 2021-12-07

## 2021-12-07 RX ORDER — SIMETHICONE 80 MG/1
1 TABLET, CHEWABLE ORAL
Qty: 0 | Refills: 0 | DISCHARGE
Start: 2021-12-07

## 2021-12-07 RX ORDER — OXYCODONE HYDROCHLORIDE 5 MG/1
1 TABLET ORAL
Qty: 0 | Refills: 0 | DISCHARGE
Start: 2021-12-07

## 2021-12-07 RX ORDER — INSULIN ASPART 100 [IU]/ML
15 INJECTION, SOLUTION SUBCUTANEOUS
Qty: 0 | Refills: 0 | DISCHARGE

## 2021-12-07 RX ORDER — CHLORHEXIDINE GLUCONATE 213 G/1000ML
1 SOLUTION TOPICAL
Qty: 0 | Refills: 0 | DISCHARGE
Start: 2021-12-07

## 2021-12-07 RX ORDER — CHLORHEXIDINE GLUCONATE 213 G/1000ML
1 SOLUTION TOPICAL
Refills: 0 | Status: DISCONTINUED | OUTPATIENT
Start: 2021-12-07 | End: 2021-12-07

## 2021-12-07 RX ORDER — SODIUM CHLORIDE 9 MG/ML
10 INJECTION INTRAMUSCULAR; INTRAVENOUS; SUBCUTANEOUS
Refills: 0 | Status: DISCONTINUED | OUTPATIENT
Start: 2021-12-07 | End: 2021-12-07

## 2021-12-07 RX ORDER — INSULIN LISPRO 100/ML
15 VIAL (ML) SUBCUTANEOUS
Qty: 0 | Refills: 0 | DISCHARGE

## 2021-12-07 RX ADMIN — OXYCODONE HYDROCHLORIDE 5 MILLIGRAM(S): 5 TABLET ORAL at 19:26

## 2021-12-07 RX ADMIN — Medication 17 UNIT(S): at 17:40

## 2021-12-07 RX ADMIN — Medication 2: at 08:56

## 2021-12-07 RX ADMIN — Medication 650 MILLIGRAM(S): at 08:58

## 2021-12-07 RX ADMIN — Medication 650 MILLIGRAM(S): at 09:30

## 2021-12-07 RX ADMIN — Medication 17 UNIT(S): at 08:57

## 2021-12-07 RX ADMIN — BNT162B2 0.3 MILLILITER(S): 0.23 INJECTION, SUSPENSION INTRAMUSCULAR at 14:13

## 2021-12-07 RX ADMIN — CEFTRIAXONE 100 MILLIGRAM(S): 500 INJECTION, POWDER, FOR SOLUTION INTRAMUSCULAR; INTRAVENOUS at 15:18

## 2021-12-07 RX ADMIN — Medication 2: at 12:19

## 2021-12-07 RX ADMIN — HEPARIN SODIUM 5000 UNIT(S): 5000 INJECTION INTRAVENOUS; SUBCUTANEOUS at 14:21

## 2021-12-07 RX ADMIN — Medication 50 GRAM(S): at 08:57

## 2021-12-07 RX ADMIN — HEPARIN SODIUM 5000 UNIT(S): 5000 INJECTION INTRAVENOUS; SUBCUTANEOUS at 06:55

## 2021-12-07 RX ADMIN — Medication 17 UNIT(S): at 12:19

## 2021-12-07 RX ADMIN — PANTOPRAZOLE SODIUM 40 MILLIGRAM(S): 20 TABLET, DELAYED RELEASE ORAL at 06:55

## 2021-12-07 NOTE — DISCHARGE NOTE NURSING/CASE MANAGEMENT/SOCIAL WORK - NSDCVIVACCINE_GEN_ALL_CORE_FT
COVID-19, mRNA, LNP-S, PF, 30 mcg/0.3 mL dose (Pfizer); 07-Dec-2021 14:13; Mai Al (RN); [employer]; [id number] (Exp. Date: 31-Dec-2021); IntraMuscular; Deltoid Right.; 0.3 milliLiter(s);   influenza, injectable, quadrivalent, preservative free; 02-Dec-2021 10:08; Chrissy Odom (MADDIE); [employer]; [id number] (Exp. Date: 30-Jun-2022); IntraMuscular; Deltoid Left.; 0.5 milliLiter(s); VIS (VIS Published: 06-Aug-2021, VIS Presented: 02-Dec-2021);

## 2021-12-07 NOTE — PROGRESS NOTE ADULT - TIME BILLING
Management of foot osteomyelitis
Insulin adjustment
Insulin adjustment for continued hyperglycemia
Management of osteomyelitis
Continued hyperglycemia
Insulin regimen for discharge
Medical management
OM

## 2021-12-07 NOTE — CONSULT NOTE ADULT - SUBJECTIVE AND OBJECTIVE BOX
Vascular Access Service Consult Note    [facility]       Diagnosis: osteomyelitis    Indications for Vascular Access (Check all that apply)  [x  ]  Antibiotic Therapy       Antibiotic Prescribed:      ceftriaxone until 1/10                                                                                     [  ]  IV Hydration  [  ]  Total Parenteral Nutrition  [  ]  Chemotherapy  [  ]  Difficult Venous Access  [  ]  CVP monitoring  [  ]  Medications with high potential for tissue necrosis on extravasation  [  ]  Other    Screening (Check all that apply)  Previous Radiation to chest  [  ] Yes      [x  ]  No  Breast Cancer                          [  ] Left     [  ]  Right    [x  ]  No  Lymph Node Dissection         [  ] Left     [  ]  Right    [ x ]  No  Pacemaker or ICD                   [  ] Left     [  ]  Right    [ x ]  No  Upper Extremity DVT             [  ] Left     [  ]  Right    [x  ]  No  Chronic Kidney Disease         [  ]  Yes     [x  ]  No  Hemodialysis                           [  ]  Yes     [ x ]  No  AV Fistula/ Graft                     [  ]  Left    [  ]  Right    [x  ]  No  Temp>101F in past 24 H       [  ]  Yes     [ x ]  No  H/O PICC/Midline                   [  ]  Yes     [x ]  No    Lab data:                        8.5    9.78  )-----------( 500      ( 07 Dec 2021 06:50 )             25.7     12-07    134<L>  |  101  |  11  ----------------------------<  154<H>  4.6   |  22  |  1.23    Ca    9.2      07 Dec 2021 06:50  Phos  4.4     12-06  Mg     1.6     12-07    TPro  7.6  /  Alb  2.6<L>  /  TBili  0.2  /  DBili  x   /  AST  11  /  ALT  6<L>  /  AlkPhos  149<H>  12-05              I have reviewed the chart, interviewed and examined the patient and determined that this patient:  [ x ] Is a candidate for a PICC line  [  ] Is a candidate for a Midline  [  ] Is not a candidate for vascular access device (reason)    Lumens:    [ x ] Single  [  ] Double

## 2021-12-07 NOTE — DISCHARGE NOTE NURSING/CASE MANAGEMENT/SOCIAL WORK - NSDCPEFALRISK_GEN_ALL_CORE
For information on Fall & Injury Prevention, visit: [email or web address] OR  [email or web address] OR  https://www.cdc.gov/steadi/patient.html

## 2021-12-07 NOTE — PROGRESS NOTE ADULT - SUBJECTIVE AND OBJECTIVE BOX
INTERVAL HPI/OVERNIGHT EVENTS:    Patient is a 46y old  Male who presents with a chief complaint of DKA (06 Dec 2021 17:08)  Pending discharge to [facility] for IV abx.  He is eating well and blood sugars remain well controlled.     FSG & Insulin received:    Yesterday:  pre-dinner fs  nutritional lispro 20  units + 2  units lispro SS  bedtime fs  lantus 45  units + 0   units lispro SS    Today:  pre-breakfast fs  nutritional lispro 17  units+ 2   units lispro SS  pre-lunch fs      Pt reports the following symptoms:    CONSTITUTIONAL:  Negative fever or chills, feels well, good appetite  EYES:  Negative  blurry vision or double vision  CARDIOVASCULAR:  Negative for chest pain or palpitations  RESPIRATORY:  Negative for cough, wheezing, or SOB   GASTROINTESTINAL:  Negative for nausea, vomiting, diarrhea, constipation, or abdominal pain  GENITOURINARY:  Negative frequency, urgency or dysuria  NEUROLOGIC:  No headache, confusion, dizziness, lightheadedness    MEDICATIONS  (STANDING):  atorvastatin 20 milliGRAM(s) Oral at bedtime  cefTRIAXone   IVPB 2000 milliGRAM(s) IV Intermittent every 24 hours  chlorhexidine 2% Cloths 1 Application(s) Topical <User Schedule>  Dakins Solution - 1/4 Strength 1 Application(s) Topical daily  dextrose 40% Gel 15 Gram(s) Oral once  dextrose 5%. 1000 milliLiter(s) (100 mL/Hr) IV Continuous <Continuous>  dextrose 50% Injectable 50 milliLiter(s) IV Push every 15 minutes  glucagon  Injectable 1 milliGRAM(s) IntraMuscular once  heparin   Injectable 5000 Unit(s) SubCutaneous every 8 hours  insulin glargine Injectable (LANTUS) 45 Unit(s) SubCutaneous at bedtime  insulin lispro (ADMELOG) corrective regimen sliding scale   SubCutaneous Before meals and at bedtime  insulin lispro Injectable (ADMELOG) 17 Unit(s) SubCutaneous three times a day before meals  pantoprazole    Tablet 40 milliGRAM(s) Oral before breakfast    MEDICATIONS  (PRN):  acetaminophen     Tablet .. 650 milliGRAM(s) Oral every 6 hours PRN Temp greater or equal to 38C (100.4F), Mild Pain (1 - 3)  calcium carbonate    500 mG (Tums) Chewable 1 Tablet(s) Chew three times a day PRN Dyspepsia  oxyCODONE    IR 5 milliGRAM(s) Oral every 6 hours PRN Moderate Pain (4 - 6)  simethicone 80 milliGRAM(s) Chew two times a day PRN Heartburn  sodium chloride 0.9% lock flush 10 milliLiter(s) IV Push every 1 hour PRN Pre/post blood products, medications, blood draw, and to maintain line patency      PHYSICAL EXAM  Vital Signs Last 24 Hrs  T(C): 36.7 (07 Dec 2021 16:54), Max: 37.1 (07 Dec 2021 05:20)  T(F): 98 (07 Dec 2021 16:54), Max: 98.8 (07 Dec 2021 05:20)  HR: 81 (07 Dec 2021 16:54) (71 - 84)  BP: 134/82 (07 Dec 2021 16:54) (106/64 - 134/82)  BP(mean): 85 (06 Dec 2021 20:30) (85 - 85)  RR: 18 (07 Dec 2021 16:54) (18 - 18)  SpO2: 100% (07 Dec 2021 16:54) (96% - 100%)    PHYSICAL EXAM:  Constitutional: malnourished male, alert in no acute distress.  HEENT: head atraumatic, PERRL, EMOI, Oropharyngeal mucosa moist, pink, tongue midline, Neck supple, FROM, no LAD  Respiratory: Clear to auscultation bilaterally.  No rales, rhonchi, wheezes.  Cardiovascular: Regular rate and rhythm.  S1, S2 heard.  Gastrointestinal:  Soft, nontender, nondistended, decreased bowel sounds  Ext: Right 5th toe partial amputation and LLE s/p amputation of all toes.   Neurological: AAOx3  Skin: b/l feet wrapped in ace    LABS:                        8.5    9.78  )-----------( 500      ( 07 Dec 2021 06:50 )             25.7     12-07    134<L>  |  101  |  11  ----------------------------<  154<H>  4.6   |  22  |  1.23    Ca    9.2      07 Dec 2021 06:50  Phos  4.4     12-06  Mg     1.6     12-07    CAPILLARY BLOOD GLUCOSE      POCT Blood Glucose.: 96 mg/dL (07 Dec 2021 17:15)  POCT Blood Glucose.: 183 mg/dL (07 Dec 2021 12:13)  POCT Blood Glucose.: 178 mg/dL (07 Dec 2021 08:26)  POCT Blood Glucose.: 132 mg/dL (06 Dec 2021 22:13)

## 2021-12-07 NOTE — PROGRESS NOTE ADULT - ASSESSMENT
45yo M PMH IDDM (diagnosed 11/2020) presented from [facility] complaining of chest pain. Reports polyuria, polydipsia and decreased appetite x1 week. Of note, pt was diagnosed w/ DM last year 11/2020 at which time he was admitted for DKA and all toes on his L foot were amputated.    Podiatry was consulted for the management of the right foot infected diabetic ulcer. Pt was noted to have gas on xray near the 5th metatarsal, although the xray read did not mention it. Right foot is malodorous and 5ccs of purulent drainage was expressed from the proximal aspect of the ulcer (dorsally over the 4th and 5th metatarsal). MICU was notified about the findings of gas on xray. Podiatry Attending discussed with MICU Attending about the importance of taking the patient to the OR immediately, however given his abn electrolyte (Potassium) and possibility of patient having arrythmia' s and increased glucose-- it was decided that we wait until patient has finished his drip and has labs have normalized. Re-evaluated in AM (12/1), pt vital signs have clinically improved. OR pushed back to evening due to emergent case in AM. CT confirms osteo of 5th MT.    Patient is now s/p right partial 5th ray resection packed open.  Abscess was noted in the OR and was drained.     Of note, Examination on 12/2/21 revealed wound on Left foot plantar aspect. +malodor. Xrays and CT are negative for gas/OM. OR cx's of the right foot are + for polymicrobial growth: Strep Agalactiae, Enterococcus faecalis, Proteus Mirabilis, Pediococcus Acidilacticiae    Plan:  - Right wound packed with WTD dressing, DSD, ABD and ACE wrap. WTD + DSD for left TMA site wound.    - Wrapped Left foot with wet to dry and DSD   - F/U vasc recs  - NWB to RLE  - No plan to return to OR, no wound vac at this time, will continue with local wound care and recc dispo to rehab w/ PICC  - rest of plan per primary team     Podiatry Following    Wound Care Instructions:  Right foot: Apply saline to gauze (wet to dry dressing) and apply to surgical site on the lateral aspect of the foot. Wrap the foot with kerlix and ACE.  Left foot: Apply saline to gauze on the plantar aspect of the TMA site and wrap with kerlix and ACE.     Pt can follow up w/ Dr. Nubia Chavarria DPM at [facility] ([facility] Center [facility]) within 1-2 weeks following discharge.   [address] [place] [address]  [phone]

## 2021-12-07 NOTE — DISCHARGE NOTE NURSING/CASE MANAGEMENT/SOCIAL WORK - PATIENT PORTAL LINK FT
You can access the FollowMyHealth Patient Portal offered by [facility] by registering at the following website: [email or web address]. By joining [employer]’s FollowMyHealth portal, you will also be able to view your health information using other applications (apps) compatible with our system.

## 2021-12-07 NOTE — PROGRESS NOTE ADULT - PROBLEM SELECTOR PROBLEM 1
Diabetes mellitus
Osteomyelitis of ankle or foot, acute

## 2021-12-07 NOTE — PROGRESS NOTE ADULT - ATTENDING COMMENTS
Pt seen on rounds this afternoon.  Denies any pain in his feet.  Will be leaving for [facility] later today.  Fingersticks near the upper part of the target range, but given the overall decreasing trend with treatment of his infection and reversal of glucose toxicity, will continue the current regimen of 45 Lantus/17 lispro post transfer

## 2021-12-07 NOTE — PROGRESS NOTE ADULT - ASSESSMENT
A/P:46y male poorly controlled diabetic with right foot osteomyelitis admitted with DKA s/p partial right 5th toe amputation, now with PICC line for long-term Abx at [facility].  DKA resolved   A1C: 14.3%  Wt: 77.6kg  Cr: 1.12  GFR: 91  Home regimen: 50 units Basaglar, 15 units Novolog

## 2021-12-07 NOTE — DISCHARGE NOTE NURSING/CASE MANAGEMENT/SOCIAL WORK - NSDCFUADDAPPT_GEN_ALL_CORE_FT
Please make sure to follow up with infectious disease Dr. Rosado in 1 month; his office will call you to schedule your appointment.     Please follow up with [facility] by calling  to make an appointment.     Please follow up with podiatry Dr. Nubia Chavarria DPM at [facility] (Foot [facility]) within 1-2 weeks following discharge.   [address] [place] [address]  [phone]    Please follow up with your primary care doctor, Dr. Violetta Prakash at [facility] [address] [place] [address], 2nd floor on January 6th at 12:30pm.   [phone]

## 2021-12-07 NOTE — PROGRESS NOTE ADULT - SUBJECTIVE AND OBJECTIVE BOX
Subjective: MISSY ON. Pt seen and examined at bedside. Discussed with pt that he will be receiving a PICC line for IV abx till 1/10/22 and will be sent to [facility]. Discussed the importance of following up with his Podiatrist outpt and to call his doctor if he notices any malodor or purulent drainage from the right foot. Pt states that he understands.     ROS:   Denies Headache, blurred vision, Chest Pain, SOB, Abdominal pain, nausea or vomiting     Social   cefTRIAXone   IVPB 2000  cefTRIAXone   IVPB 2000  heparin   Injectable 5000      Allergies    No Known Allergies    Intolerances        Vital Signs Last 24 Hrs  T(C): 36.9 (07 Dec 2021 11:03), Max: 37.1 (07 Dec 2021 05:20)  T(F): 98.4 (07 Dec 2021 11:03), Max: 98.8 (07 Dec 2021 05:20)  HR: 71 (07 Dec 2021 11:03) (71 - 96)  BP: 122/79 (07 Dec 2021 11:03) (106/64 - 122/79)  BP(mean): 85 (06 Dec 2021 20:30) (85 - 85)  RR: 18 (07 Dec 2021 11:03) (18 - 18)  SpO2: 96% (07 Dec 2021 11:03) (96% - 100%)  I&O's Summary      Physical Exam:  GEN: NAD, AAOx3, resting comfortably with pain controlled      LE Focused: CFT shows adequate perfusion b/l with no signs of ischemic compromise.  No strikethrough is appreciated on surgical dressings.  Dressings were dry, clean, and intact.  No neuromuscular deficits appreciated. Right foot wound has been left open on the lateral aspect s/p partial 5th ray amp. 2 incisions present dorsally (one over the 1st intermetatarsal space and 2nd over the 3rd intermetatarsal space)-- packed with gauze. Granular and fibrous tissue present at amp site. Negative for purulent drainage at this time.      Wound at plantar aspect of L foot TMA site. + mild malodor. Hyperkeratotic skin present diffusely plantarly on L foot. Negative for purulent drainage, but undermining present. Negative for tunneling/ tracking.         LABS:                        8.5    9.78  )-----------( 500      ( 07 Dec 2021 06:50 )             25.7     12-07    134<L>  |  101  |  11  ----------------------------<  154<H>  4.6   |  22  |  1.23    Ca    9.2      07 Dec 2021 06:50  Phos  4.4     12-06  Mg     1.6     12-07          Radiology:  < from: CT Foot w/ IV Cont, Left (12.04.21 @ 14:25) >    Redemonstration of transmetatarsal amputation. Cutaneous wounds at the distal margin of the first metatarsal remnant and at the level the fourth/fifth metatarsal remnants. Minimal productive change along the distal tip of the fourth metatarsal remnant which is nonspecific but may be related to developing osteomyelitis. Further characterization with MRI is recommended, assuming no clinical contraindications.    No drainable fluid collection. Findings suggestive of cellulitis about the distal amputation margin.    < end of copied text >      Microbiology:  Culture - Surgical Swab (12.01.21 @ 19:48)    Gram Stain:   Moderate Gram Positive Cocci in Pairs and Chains  Moderate White blood cells    -  Ampicillin: S <=2 Predicts results to ampicillin/sulbactam, amoxacillin-clavulanate and  piperacillin-tazobactam.    -  Ampicillin: R >16 These ampicillin results predict results for amoxicillin    -  Ampicillin/Sulbactam: S <=4/2 Enterobacter, Klebsiella aerogenes, Citrobacter, and Serratia may develop resistance during prolonged therapy (3-4 days)    -  Cefazolin: S 8 Enterobacter, Klebsiella aerogenes, Citrobacter, and Serratia may develop resistance during prolonged therapy (3-4 days)    -  Cefepime: S <=2    -  Ceftriaxone: S <=1 Enterobacter, Klebsiella aerogenes, Citrobacter, and Serratia may develop resistance during prolonged therapy    -  Ciprofloxacin: S <=0.25    -  Clindamycin: R    -  Ertapenem: S <=0.5    -  Erythromycin: R    -  Gentamicin: S <=2    -  Levofloxacin: S    -  Penicillin: S Predicts results for ampicillin, amoxicillin, amoxicillin/clavulanate, ampicillin/sulbactam, 1st, 2nd and 3rd generation cephalosporins and carbapenems.    -  Piperacillin/Tazobactam: S <=8    -  Tobramycin: S <=2    -  Trimethoprim/Sulfamethoxazole: S <=0.5/9.5    -  Vancomycin: S 2    -  Vancomycin: S    Specimen Source: .Surgical Swab deep wound right foot    Culture Results:   Moderate Streptococcus agalactiae (Group B)  Rare to few Enterococcus faecalis  Rare to few Proteus mirabilis  Rare Pediococcus acidilactici Beta lactamase negative    Organism Identification: Streptococcus agalactiae (Group B)  Enterococcus faecalis  Proteus mirabilis    Organism: Streptococcus agalactiae (Group B)    Organism: Enterococcus faecalis    Organism: Proteus mirabilis    Method Type: JOSE    Method Type: JOSE    Method Type: KB

## 2021-12-07 NOTE — PROGRESS NOTE ADULT - PROBLEM SELECTOR PLAN 1
1.  DM type 2 - uncontrolled   Please give Lantus to 45 units at night.  Please give Lispro  17  units before each meal.  Please c/w  Lispro moderate dose sliding scale four times daily with meals and at bedtime       Pt's fingerstick glucose goal is 100-180    Will continue to monitor       Pt can follow up at discharge with [facility] by calling  to make an appointment.    case seen and discussed with Dr. Haines and update primary team

## 2021-12-08 DIAGNOSIS — Z71.89 OTHER SPECIFIED COUNSELING: ICD-10-CM

## 2021-12-09 PROBLEM — Z00.00 ENCOUNTER FOR PREVENTIVE HEALTH EXAMINATION: Status: ACTIVE | Noted: 2021-12-09

## 2021-12-15 LAB — SURGICAL PATHOLOGY STUDY: SIGNIFICANT CHANGE UP

## 2021-12-21 DIAGNOSIS — E11.40 TYPE 2 DIABETES MELLITUS WITH DIABETIC NEUROPATHY, UNSPECIFIED: ICD-10-CM

## 2021-12-21 DIAGNOSIS — E11.69 TYPE 2 DIABETES MELLITUS WITH OTHER SPECIFIED COMPLICATION: ICD-10-CM

## 2021-12-21 DIAGNOSIS — B95.1 STREPTOCOCCUS, GROUP B, AS THE CAUSE OF DISEASES CLASSIFIED ELSEWHERE: ICD-10-CM

## 2021-12-21 DIAGNOSIS — B95.2 ENTEROCOCCUS AS THE CAUSE OF DISEASES CLASSIFIED ELSEWHERE: ICD-10-CM

## 2021-12-21 DIAGNOSIS — R63.6 UNDERWEIGHT: ICD-10-CM

## 2021-12-21 DIAGNOSIS — E11.10 TYPE 2 DIABETES MELLITUS WITH KETOACIDOSIS WITHOUT COMA: ICD-10-CM

## 2021-12-21 DIAGNOSIS — M86.171 OTHER ACUTE OSTEOMYELITIS, RIGHT ANKLE AND FOOT: ICD-10-CM

## 2021-12-21 DIAGNOSIS — E86.0 DEHYDRATION: ICD-10-CM

## 2021-12-21 DIAGNOSIS — D64.9 ANEMIA, UNSPECIFIED: ICD-10-CM

## 2021-12-21 DIAGNOSIS — B96.4 PROTEUS (MIRABILIS) (MORGANII) AS THE CAUSE OF DISEASES CLASSIFIED ELSEWHERE: ICD-10-CM

## 2021-12-21 DIAGNOSIS — Z23 ENCOUNTER FOR IMMUNIZATION: ICD-10-CM

## 2021-12-21 DIAGNOSIS — A48.0 GAS GANGRENE: ICD-10-CM

## 2021-12-21 DIAGNOSIS — E11.52 TYPE 2 DIABETES MELLITUS WITH DIABETIC PERIPHERAL ANGIOPATHY WITH GANGRENE: ICD-10-CM

## 2021-12-21 DIAGNOSIS — E78.5 HYPERLIPIDEMIA, UNSPECIFIED: ICD-10-CM

## 2021-12-21 DIAGNOSIS — R07.9 CHEST PAIN, UNSPECIFIED: ICD-10-CM

## 2021-12-21 DIAGNOSIS — R65.20 SEVERE SEPSIS WITHOUT SEPTIC SHOCK: ICD-10-CM

## 2021-12-21 DIAGNOSIS — A41.9 SEPSIS, UNSPECIFIED ORGANISM: ICD-10-CM

## 2021-12-21 DIAGNOSIS — F17.200 NICOTINE DEPENDENCE, UNSPECIFIED, UNCOMPLICATED: ICD-10-CM

## 2021-12-21 DIAGNOSIS — L02.611 CUTANEOUS ABSCESS OF RIGHT FOOT: ICD-10-CM

## 2021-12-21 DIAGNOSIS — Z89.412 ACQUIRED ABSENCE OF LEFT GREAT TOE: ICD-10-CM

## 2021-12-21 DIAGNOSIS — E87.1 HYPO-OSMOLALITY AND HYPONATREMIA: ICD-10-CM

## 2021-12-21 DIAGNOSIS — Z89.422 ACQUIRED ABSENCE OF OTHER LEFT TOE(S): ICD-10-CM

## 2021-12-21 DIAGNOSIS — E11.621 TYPE 2 DIABETES MELLITUS WITH FOOT ULCER: ICD-10-CM

## 2021-12-21 DIAGNOSIS — Z91.14 PATIENT'S OTHER NONCOMPLIANCE WITH MEDICATION REGIMEN: ICD-10-CM

## 2021-12-21 DIAGNOSIS — D75.839 THROMBOCYTOSIS, UNSPECIFIED: ICD-10-CM

## 2021-12-21 DIAGNOSIS — N17.9 ACUTE KIDNEY FAILURE, UNSPECIFIED: ICD-10-CM

## 2021-12-21 DIAGNOSIS — L97.519 NON-PRESSURE CHRONIC ULCER OF OTHER PART OF RIGHT FOOT WITH UNSPECIFIED SEVERITY: ICD-10-CM

## 2021-12-21 DIAGNOSIS — E87.5 HYPERKALEMIA: ICD-10-CM

## 2021-12-29 ENCOUNTER — APPOINTMENT (OUTPATIENT)
Dept: ENDOCRINOLOGY | Facility: CLINIC | Age: 46
End: 2021-12-29

## 2022-01-06 ENCOUNTER — APPOINTMENT (OUTPATIENT)
Dept: INTERNAL MEDICINE | Facility: CLINIC | Age: 47
End: 2022-01-06

## 2022-01-06 PROCEDURE — 96372 THER/PROPH/DIAG INJ SC/IM: CPT | Mod: XU

## 2022-01-06 PROCEDURE — 93005 ELECTROCARDIOGRAM TRACING: CPT

## 2022-01-06 PROCEDURE — 96375 TX/PRO/DX INJ NEW DRUG ADDON: CPT

## 2022-01-06 PROCEDURE — 36415 COLL VENOUS BLD VENIPUNCTURE: CPT

## 2022-01-06 PROCEDURE — 71045 X-RAY EXAM CHEST 1 VIEW: CPT

## 2022-01-06 PROCEDURE — 83690 ASSAY OF LIPASE: CPT

## 2022-01-06 PROCEDURE — 84484 ASSAY OF TROPONIN QUANT: CPT

## 2022-01-06 PROCEDURE — 82728 ASSAY OF FERRITIN: CPT

## 2022-01-06 PROCEDURE — 85730 THROMBOPLASTIN TIME PARTIAL: CPT

## 2022-01-06 PROCEDURE — 88305 TISSUE EXAM BY PATHOLOGIST: CPT

## 2022-01-06 PROCEDURE — 93925 LOWER EXTREMITY STUDY: CPT

## 2022-01-06 PROCEDURE — 87086 URINE CULTURE/COLONY COUNT: CPT

## 2022-01-06 PROCEDURE — 86901 BLOOD TYPING SEROLOGIC RH(D): CPT

## 2022-01-06 PROCEDURE — 99285 EMERGENCY DEPT VISIT HI MDM: CPT

## 2022-01-06 PROCEDURE — 82977 ASSAY OF GGT: CPT

## 2022-01-06 PROCEDURE — 84100 ASSAY OF PHOSPHORUS: CPT

## 2022-01-06 PROCEDURE — 83880 ASSAY OF NATRIURETIC PEPTIDE: CPT

## 2022-01-06 PROCEDURE — 85045 AUTOMATED RETICULOCYTE COUNT: CPT

## 2022-01-06 PROCEDURE — 80202 ASSAY OF VANCOMYCIN: CPT

## 2022-01-06 PROCEDURE — 85027 COMPLETE CBC AUTOMATED: CPT

## 2022-01-06 PROCEDURE — 87040 BLOOD CULTURE FOR BACTERIA: CPT

## 2022-01-06 PROCEDURE — 85610 PROTHROMBIN TIME: CPT

## 2022-01-06 PROCEDURE — 81001 URINALYSIS AUTO W/SCOPE: CPT

## 2022-01-06 PROCEDURE — 97162 PT EVAL MOD COMPLEX 30 MIN: CPT

## 2022-01-06 PROCEDURE — 85025 COMPLETE CBC W/AUTO DIFF WBC: CPT

## 2022-01-06 PROCEDURE — U0003: CPT

## 2022-01-06 PROCEDURE — 82010 KETONE BODYS QUAN: CPT

## 2022-01-06 PROCEDURE — 36573 INSJ PICC RS&I 5 YR+: CPT

## 2022-01-06 PROCEDURE — 96374 THER/PROPH/DIAG INJ IV PUSH: CPT

## 2022-01-06 PROCEDURE — 83540 ASSAY OF IRON: CPT

## 2022-01-06 PROCEDURE — 87184 SC STD DISK METHOD PER PLATE: CPT

## 2022-01-06 PROCEDURE — 87186 SC STD MICRODIL/AGAR DIL: CPT

## 2022-01-06 PROCEDURE — 80048 BASIC METABOLIC PNL TOTAL CA: CPT

## 2022-01-06 PROCEDURE — 73610 X-RAY EXAM OF ANKLE: CPT

## 2022-01-06 PROCEDURE — 87635 SARS-COV-2 COVID-19 AMP PRB: CPT

## 2022-01-06 PROCEDURE — 97530 THERAPEUTIC ACTIVITIES: CPT

## 2022-01-06 PROCEDURE — 87070 CULTURE OTHR SPECIMN AEROBIC: CPT

## 2022-01-06 PROCEDURE — 80061 LIPID PANEL: CPT

## 2022-01-06 PROCEDURE — 90686 IIV4 VACC NO PRSV 0.5 ML IM: CPT

## 2022-01-06 PROCEDURE — 73701 CT LOWER EXTREMITY W/DYE: CPT

## 2022-01-06 PROCEDURE — 83605 ASSAY OF LACTIC ACID: CPT

## 2022-01-06 PROCEDURE — 82140 ASSAY OF AMMONIA: CPT

## 2022-01-06 PROCEDURE — 84300 ASSAY OF URINE SODIUM: CPT

## 2022-01-06 PROCEDURE — 83550 IRON BINDING TEST: CPT

## 2022-01-06 PROCEDURE — 86900 BLOOD TYPING SEROLOGIC ABO: CPT

## 2022-01-06 PROCEDURE — U0005: CPT

## 2022-01-06 PROCEDURE — 84466 ASSAY OF TRANSFERRIN: CPT

## 2022-01-06 PROCEDURE — 80307 DRUG TEST PRSMV CHEM ANLYZR: CPT

## 2022-01-06 PROCEDURE — 86850 RBC ANTIBODY SCREEN: CPT

## 2022-01-06 PROCEDURE — 83735 ASSAY OF MAGNESIUM: CPT

## 2022-01-06 PROCEDURE — 97116 GAIT TRAINING THERAPY: CPT

## 2022-01-06 PROCEDURE — 82962 GLUCOSE BLOOD TEST: CPT

## 2022-01-06 PROCEDURE — 73630 X-RAY EXAM OF FOOT: CPT

## 2022-01-06 PROCEDURE — 83036 HEMOGLOBIN GLYCOSYLATED A1C: CPT

## 2022-01-06 PROCEDURE — 82570 ASSAY OF URINE CREATININE: CPT

## 2022-01-06 PROCEDURE — 87075 CULTR BACTERIA EXCEPT BLOOD: CPT

## 2022-01-06 PROCEDURE — 80053 COMPREHEN METABOLIC PANEL: CPT

## 2022-01-06 PROCEDURE — 83010 ASSAY OF HAPTOGLOBIN QUANT: CPT

## 2022-01-06 PROCEDURE — 82803 BLOOD GASES ANY COMBINATION: CPT

## 2022-01-06 PROCEDURE — 83615 LACTATE (LD) (LDH) ENZYME: CPT

## 2022-01-10 ENCOUNTER — APPOINTMENT (OUTPATIENT)
Dept: INFECTIOUS DISEASE | Facility: CLINIC | Age: 47
End: 2022-01-10
Payer: MEDICAID

## 2022-01-10 DIAGNOSIS — M86.9 OSTEOMYELITIS, UNSPECIFIED: ICD-10-CM

## 2022-01-10 PROCEDURE — ZZZZZ: CPT

## 2022-01-11 NOTE — ED ADULT NURSE NOTE - CAS DISCH ACCOMP BY
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     
Self

## 2022-11-08 NOTE — PROGRESS NOTE ADULT - SUBJECTIVE AND OBJECTIVE BOX
Subjective: MISSY ON. Pt seen and examined at bedside. Right foot remains malodorous. No tunneling or purulent drainage observed.    ROS:   Denies Headache, blurred vision, Chest Pain, SOB, Abdominal pain, nausea or vomiting     Social   cefTRIAXone   IVPB 2000  cefTRIAXone   IVPB 2000  heparin   Injectable 5000      Allergies    No Known Allergies    Intolerances        Vital Signs Last 24 Hrs  T(C): 36.9 (03 Dec 2021 09:10), Max: 36.9 (03 Dec 2021 05:33)  T(F): 98.5 (03 Dec 2021 09:10), Max: 98.5 (03 Dec 2021 05:33)  HR: 96 (03 Dec 2021 09:10) (87 - 100)  BP: 105/60 (03 Dec 2021 09:10) (105/60 - 120/69)  BP(mean): --  RR: 18 (03 Dec 2021 09:10) (18 - 20)  SpO2: 96% (03 Dec 2021 09:10) (96% - 98%)  I&O's Summary    02 Dec 2021 07:01  -  03 Dec 2021 07:00  --------------------------------------------------------  IN: 912.5 mL / OUT: 0 mL / NET: 912.5 mL        Physical Exam:  GEN: NAD, AAOx3, resting comfortably with pain controlled      LE Focused: CFT shows adequate perfusion b/l with no signs of ischemic compromise.  No strikethrough is appreciated on surgical dressings.  Dressings were dry, clean, and intact.  No neuromuscular deficits appreciated. Right foot wound has been left open on the lateral aspect s/p partial 5th ray amp. 2 incisions present dorsally (one over the 1st intermetatarsal space and 2nd over the 3rd intermetatarsal space)-- packed with gauze. New wound at plantar aspect of L foot TMA site. + malodor. Hyperkeratotic skin present diffusely plantarly on L foot.       LABS:                        9.2    10.70 )-----------( 448      ( 03 Dec 2021 06:58 )             27.8     12-03    131<L>  |  96  |  21  ----------------------------<  179<H>  3.6   |  20<L>  |  1.22    Ca    9.3      03 Dec 2021 06:58  Phos  2.5     12-02  Mg     1.9     12-03    TPro  7.5  /  Alb  2.5<L>  /  TBili  0.3  /  DBili  x   /  AST  8<L>  /  ALT  <5<L>  /  AlkPhos  149<H>  12-03        Radiology:    Microbiology:     Sig: Apply a thin layer to the affected areas daily

## 2022-12-05 NOTE — PROGRESS NOTE ADULT - ASSESSMENT
Please remind pt, I am only prescribing pain meds until she gets in to see pain mx- her appt with pain mx is 12/22 she will need to discuss that with pain doctor    45yo M PMH IDDM (diagnosed 11/2020) presented from [facility] complaining of chest pain. Reports polyuria, polydipsia and decreased appetite x1 week. Of note, pt was diagnosed w/ DM last year 11/2020 at which time he was admitted for DKA and all toes on his L foot were amputated.    Podiatry was consulted for the management of the right foot infected diabetic ulcer. Pt was noted to have gas on xray near the 5th metatarsal, although the xray read did not mention it. Right foot is malodorous and 5ccs of purulent drainage was expressed from the proximal aspect of the ulcer (dorsally over the 4th and 5th metatarsal). MICU was notified about the findings of gas on xray. Podiatry Attending discussed with MICU Attending about the importance of taking the patient to the OR immediately, however given his abn electrolyte (Potassium) and possibility of patient having arrythmia' s and increased glucose-- it was decided that we wait until patient has finished his drip and has labs have normalized. Re-evaluated in AM (12/1), pt vital signs and clinically improved. OR pushed back to evening due to emergent case in AM. CT confirms osteo of 5th MT.  Of note, pt is now s/p right partial 5th ray resection packed open.      Plan:  - Wound packed open with betadine soaked packing strip, dressed with DSD, ABD and ACE wrap.   - Will change to dakin's solution soaked gauze packing on first dressing change.   -Recc vascular consult, as wound probed plantar proximally towards calcaneus, pt may require BKA  - f/u post op foot & ankle XR  - f/u OR Cx  - NWB to RLE  - c/w empiric ABX    Plan d/w Attending    Podiatry Following    47yo M PMH IDDM (diagnosed 11/2020) presented from [facility] complaining of chest pain. Reports polyuria, polydipsia and decreased appetite x1 week. Of note, pt was diagnosed w/ DM last year 11/2020 at which time he was admitted for DKA and all toes on his L foot were amputated.    Podiatry was consulted for the management of the right foot infected diabetic ulcer. Pt was noted to have gas on xray near the 5th metatarsal, although the xray read did not mention it. Right foot is malodorous and 5ccs of purulent drainage was expressed from the proximal aspect of the ulcer (dorsally over the 4th and 5th metatarsal). MICU was notified about the findings of gas on xray. Podiatry Attending discussed with MICU Attending about the importance of taking the patient to the OR immediately, however given his abn electrolyte (Potassium) and possibility of patient having arrythmia' s and increased glucose-- it was decided that we wait until patient has finished his drip and has labs have normalized. Re-evaluated in AM (12/1), pt vital signs and clinically improved. OR pushed back to evening due to emergent case in AM. CT confirms osteo of 5th MT.  Of note, pt is now s/p right partial 5th ray resection packed open.      Plan:  - Wound packed open with betadine soaked packing strip, dressed with DSD, ABD and ACE wrap.   - Will change to dakin's solution soaked gauze packing on first dressing change.   -Recc vascular consult, as wound probed plantar proximally towards calcaneus, pt may require BKA  - f/u post op foot & ankle XR  - f/u OR Cx  - NWB to RLE  - c/w empiric ABX; pending final Cx and susceptibilities    Plan d/w Attending    Podiatry Following

## 2023-07-10 ENCOUNTER — APPOINTMENT (OUTPATIENT)
Dept: UROLOGY | Facility: CLINIC | Age: 48
End: 2023-07-10
Payer: MEDICARE

## 2023-07-10 VITALS
SYSTOLIC BLOOD PRESSURE: 156 MMHG | BODY MASS INDEX: 24.88 KG/M2 | OXYGEN SATURATION: 93 % | DIASTOLIC BLOOD PRESSURE: 102 MMHG | TEMPERATURE: 97.2 F | WEIGHT: 215 LBS | HEART RATE: 91 BPM | HEIGHT: 78 IN

## 2023-07-10 DIAGNOSIS — Z78.9 OTHER SPECIFIED HEALTH STATUS: ICD-10-CM

## 2023-07-10 DIAGNOSIS — Z72.0 TOBACCO USE: ICD-10-CM

## 2023-07-10 DIAGNOSIS — N52.9 MALE ERECTILE DYSFUNCTION, UNSPECIFIED: ICD-10-CM

## 2023-07-10 DIAGNOSIS — E78.5 HYPERLIPIDEMIA, UNSPECIFIED: ICD-10-CM

## 2023-07-10 DIAGNOSIS — E11.9 TYPE 2 DIABETES MELLITUS W/OUT COMPLICATIONS: ICD-10-CM

## 2023-07-10 PROCEDURE — 99204 OFFICE O/P NEW MOD 45 MIN: CPT

## 2023-07-10 RX ORDER — GABAPENTIN 300 MG/1
300 CAPSULE ORAL
Refills: 0 | Status: ACTIVE | COMMUNITY

## 2023-07-10 RX ORDER — DICLOFENAC SODIUM 50 MG/1
50 TABLET, DELAYED RELEASE ORAL
Refills: 0 | Status: ACTIVE | COMMUNITY

## 2023-07-10 RX ORDER — SILDENAFIL 25 MG/1
25 TABLET ORAL
Qty: 10 | Refills: 3 | Status: ACTIVE | COMMUNITY
Start: 2023-07-10 | End: 1900-01-01

## 2023-07-10 RX ORDER — ATORVASTATIN CALCIUM 80 MG/1
TABLET, FILM COATED ORAL
Refills: 0 | Status: ACTIVE | COMMUNITY

## 2023-07-10 RX ORDER — INSULIN GLARGINE 100 [IU]/ML
INJECTION, SOLUTION SUBCUTANEOUS
Refills: 0 | Status: ACTIVE | COMMUNITY

## 2023-07-10 NOTE — HISTORY OF PRESENT ILLNESS
[FreeTextEntry1] : 47 year old man presents with long history of erectile dysfunction. He has previously tried unprescribed medications without significant benefit. 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 History of hyperlipidemia. Acknowledges significant stressors and low mood. Active smoker.

## 2023-07-10 NOTE — LETTER BODY
[Dear  ___] : Dear  [unfilled], [Courtesy Letter:] : I had the pleasure of seeing your patient, [unfilled], in my office today. [Please see my note below.] : Please see my note below. [Consult Closing:] : Thank you very much for allowing me to participate in the care of this patient.  If you have any questions, please do not hesitate to contact me. [Sincerely,] : Sincerely, [FreeTextEntry3] : Muriel Scanlon MD

## 2023-07-10 NOTE — PHYSICAL EXAM
[General Appearance - Well Developed] : well developed [General Appearance - Well Nourished] : well nourished [Normal Appearance] : normal appearance [Well Groomed] : well groomed [General Appearance - In No Acute Distress] : no acute distress [Edema] : no peripheral edema [] : no respiratory distress [Respiration, Rhythm And Depth] : normal respiratory rhythm and effort [Exaggerated Use Of Accessory Muscles For Inspiration] : no accessory muscle use [No Focal Deficits] : no focal deficits [Oriented To Time, Place, And Person] : oriented to person, place, and time [Affect] : the affect was normal [Not Anxious] : not anxious [FreeTextEntry1] : Appears depressed

## 2023-07-10 NOTE — ASSESSMENT
[FreeTextEntry1] : I had a lengthy discussion with the patient regarding the various causes of erectile dysfunction.\par \par We have reviewed his past medical history, surgical history, and medications and discussed its effects on erectile function. We reviewed laboratory results that are pertinent to erectile function. I have stressed the importance on focusing on overall health in order to preserve erectile function, as it is merely a mirror image of overall health, physical and emotional. He has significant medical and psychological contributers to erectile dysfunction that we discussed in detail.\par \par Treatment wise, we specifically discussed oral PDE5 inhibitors, vacuum erection devices, transurethral PGE1, intercavernosal injections, and penile prosthesis implantation. We finally discussed the cardiovascular implications (endothelial dysfunction) of a diagnosis of erectile dysfunction in that there is a higher risk of underlying, sometimes clinically occult cardiovascular disease, which may be unmasked with a stress evaluation. We discussed seeing a therapist to discuss psychologic stressors.\par  - Begin sildenafil 25 mg prn\par  - Optimize medical and psychologic health\par  - F/U in 2 months\par

## 2023-09-11 ENCOUNTER — APPOINTMENT (OUTPATIENT)
Dept: UROLOGY | Facility: CLINIC | Age: 48
End: 2023-09-11

## 2024-06-26 NOTE — ED ADULT NURSE NOTE - GASTROINTESTINAL ASSESSMENT
For Your Information     If your provider ordered any imaging for you today. Our pre-scheduling services will be reaching out to you within 2 business days to schedule this. Prescheduling Services can be reached by calling [phone].    If you are in need of a medication refill, please use one of the following options. You can expect your medication to be filled within 2-3 business days.   1. Call your pharmacy for all medication refills and renewals.   2. [email or web address]- [email or web address]  3. Call your providers office    If your provider ordered testing today, you will be notified of your lab results within 3-5 business days and imaging results within 7-14 business days, unless specified otherwise. If you have not received your results within the allotted business days please call your provider's office. Have you signed up for the PatientsLike[employer] Adrianne, if not please consider doing so to receive results on the adrianne as soon as they have been resulted by the system. [email or web address]     Medication Prior Authorizations may take up to 30 days for approval/denial.     You may be receiving a survey.  Please take the time to complete this, as your feedback is very important to us!  We strive to make your experience exceptional, and your comments help us with that goal.  We look forward to hearing from you!    For all future appointments please arrive 15 minutes prior to your scheduled visit.     Patient Contact Center Business Office: assistance with medical billing & financial inquires [phone]               - - -

## 2025-02-27 NOTE — PROGRESS NOTE ADULT - PROBLEM SELECTOR PLAN 8
F: none  E: replete PRN  N: consistent carb   GI ppx: PPI  DVT ppx: heparin q12  Code status: FULL CODE  Dispo: [facility] [Well Nourished] : well nourished [Well Developed] : well developed [Well Groomed] : well groomed [Alert] : ~L alert [Active] : active [Normal Breathing Pattern] : normal breathing pattern [No Respiratory Distress] : no respiratory distress [No Allergic Shiners] : no allergic shiners [No Drainage] : no drainage [No Conjunctivitis] : no conjunctivitis [Tympanic Membranes Clear] : tympanic membranes were clear [Nasal Mucosa Non-Edematous] : nasal mucosa non-edematous [No Sinus Tenderness] : no sinus tenderness [Absence Of Retractions] : absence of retractions [Symmetric] : symmetric [Good Expansion] : good expansion [No Acc Muscle Use] : no accessory muscle use [Good aeration to bases] : good aeration to bases [Equal Breath Sounds] : equal breath sounds bilaterally [No Crackles] : no crackles [No Rhonchi] : no rhonchi [No Wheezing] : no wheezing [Normal Sinus Rhythm] : normal sinus rhythm [Soft, Non-Tender] : soft, non-tender [Abdomen Mass (___ Cm)] : no abdominal mass palpated [Full ROM] : full range of motion [No Clubbing] : no clubbing [Capillary Refill < 2 secs] : capillary refill less than two seconds [No Cyanosis] : no cyanosis [No Petechiae] : no petechiae [No Edema] : no edema [No Kyphoscoliosis] : no kyphoscoliosis [No Contractures] : no contractures [Abnormal Walk] : normal gait [Alert and  Oriented] : alert and oriented [No Abnormal Focal Findings] : no abnormal focal findings [Normal Muscle Tone And Reflexes] : normal muscle tone and reflexes [No Birth Marks] : no birth marks [No Rashes] : no rashes [No Skin Lesions] : no skin lesions [Non-Erythematous] : non-erythematous [FreeTextEntry1] : slim, happy, interactive, no cough